# Patient Record
Sex: FEMALE | Race: WHITE | NOT HISPANIC OR LATINO
[De-identification: names, ages, dates, MRNs, and addresses within clinical notes are randomized per-mention and may not be internally consistent; named-entity substitution may affect disease eponyms.]

---

## 2017-05-27 ENCOUNTER — APPOINTMENT (OUTPATIENT)
Dept: MAMMOGRAPHY | Facility: CLINIC | Age: 52
End: 2017-05-27

## 2017-05-27 ENCOUNTER — OUTPATIENT (OUTPATIENT)
Dept: OUTPATIENT SERVICES | Facility: HOSPITAL | Age: 52
LOS: 1 days | End: 2017-05-27
Payer: COMMERCIAL

## 2017-05-27 DIAGNOSIS — Z00.8 ENCOUNTER FOR OTHER GENERAL EXAMINATION: ICD-10-CM

## 2017-05-27 PROCEDURE — 77063 BREAST TOMOSYNTHESIS BI: CPT

## 2017-05-27 PROCEDURE — 77067 SCR MAMMO BI INCL CAD: CPT

## 2017-06-15 ENCOUNTER — APPOINTMENT (OUTPATIENT)
Dept: ULTRASOUND IMAGING | Facility: CLINIC | Age: 52
End: 2017-06-15

## 2017-08-14 ENCOUNTER — APPOINTMENT (OUTPATIENT)
Dept: DERMATOLOGY | Facility: CLINIC | Age: 52
End: 2017-08-14
Payer: COMMERCIAL

## 2017-08-14 PROCEDURE — 99201 OFFICE OUTPATIENT NEW 10 MINUTES: CPT

## 2017-09-05 ENCOUNTER — NON-APPOINTMENT (OUTPATIENT)
Age: 52
End: 2017-09-05

## 2017-09-05 ENCOUNTER — APPOINTMENT (OUTPATIENT)
Dept: FAMILY MEDICINE | Facility: CLINIC | Age: 52
End: 2017-09-05
Payer: COMMERCIAL

## 2017-09-05 VITALS
OXYGEN SATURATION: 98 % | HEIGHT: 66 IN | SYSTOLIC BLOOD PRESSURE: 124 MMHG | WEIGHT: 190 LBS | BODY MASS INDEX: 30.53 KG/M2 | HEART RATE: 104 BPM | DIASTOLIC BLOOD PRESSURE: 82 MMHG

## 2017-09-05 DIAGNOSIS — Z78.9 OTHER SPECIFIED HEALTH STATUS: ICD-10-CM

## 2017-09-05 DIAGNOSIS — Z23 ENCOUNTER FOR IMMUNIZATION: ICD-10-CM

## 2017-09-05 DIAGNOSIS — Z87.891 PERSONAL HISTORY OF NICOTINE DEPENDENCE: ICD-10-CM

## 2017-09-05 DIAGNOSIS — Z82.5 FAMILY HISTORY OF ASTHMA AND OTHER CHRONIC LOWER RESPIRATORY DISEASES: ICD-10-CM

## 2017-09-05 DIAGNOSIS — Z83.3 FAMILY HISTORY OF DIABETES MELLITUS: ICD-10-CM

## 2017-09-05 PROCEDURE — 99386 PREV VISIT NEW AGE 40-64: CPT | Mod: 25

## 2017-09-05 PROCEDURE — 90471 IMMUNIZATION ADMIN: CPT

## 2017-09-05 PROCEDURE — 36415 COLL VENOUS BLD VENIPUNCTURE: CPT

## 2017-09-05 PROCEDURE — 90715 TDAP VACCINE 7 YRS/> IM: CPT

## 2017-09-05 PROCEDURE — 93000 ELECTROCARDIOGRAM COMPLETE: CPT

## 2017-09-05 PROCEDURE — 99213 OFFICE O/P EST LOW 20 MIN: CPT | Mod: 25

## 2017-09-06 LAB
FERRITIN SERPL-MCNC: 256 NG/ML
FOLATE SERPL-MCNC: 10.6 NG/ML
IRON SATN MFR SERPL: 41 %
IRON SERPL-MCNC: 116 UG/DL
TIBC SERPL-MCNC: 280 UG/DL
UIBC SERPL-MCNC: 164 UG/DL
VIT B12 SERPL-MCNC: 471 PG/ML

## 2017-09-15 LAB — HEMOCCULT STL QL IA: NEGATIVE

## 2017-10-17 ENCOUNTER — APPOINTMENT (OUTPATIENT)
Dept: FAMILY MEDICINE | Facility: CLINIC | Age: 52
End: 2017-10-17
Payer: COMMERCIAL

## 2017-10-17 VITALS
TEMPERATURE: 97.8 F | WEIGHT: 190 LBS | SYSTOLIC BLOOD PRESSURE: 120 MMHG | HEART RATE: 88 BPM | BODY MASS INDEX: 30.53 KG/M2 | DIASTOLIC BLOOD PRESSURE: 70 MMHG | HEIGHT: 66 IN

## 2017-10-17 DIAGNOSIS — Z23 ENCOUNTER FOR IMMUNIZATION: ICD-10-CM

## 2017-10-17 DIAGNOSIS — R22.9 LOCALIZED SWELLING, MASS AND LUMP, UNSPECIFIED: ICD-10-CM

## 2017-10-17 PROCEDURE — 90686 IIV4 VACC NO PRSV 0.5 ML IM: CPT

## 2017-10-17 PROCEDURE — G0008: CPT

## 2017-10-17 PROCEDURE — 99214 OFFICE O/P EST MOD 30 MIN: CPT | Mod: 25

## 2017-10-22 ENCOUNTER — FORM ENCOUNTER (OUTPATIENT)
Age: 52
End: 2017-10-22

## 2017-10-23 ENCOUNTER — APPOINTMENT (OUTPATIENT)
Dept: ULTRASOUND IMAGING | Facility: CLINIC | Age: 52
End: 2017-10-23
Payer: COMMERCIAL

## 2017-10-23 ENCOUNTER — OUTPATIENT (OUTPATIENT)
Dept: OUTPATIENT SERVICES | Facility: HOSPITAL | Age: 52
LOS: 1 days | End: 2017-10-23
Payer: COMMERCIAL

## 2017-10-23 DIAGNOSIS — R22.9 LOCALIZED SWELLING, MASS AND LUMP, UNSPECIFIED: ICD-10-CM

## 2017-10-23 PROCEDURE — 76882 US LMTD JT/FCL EVL NVASC XTR: CPT

## 2017-10-23 PROCEDURE — 76882 US LMTD JT/FCL EVL NVASC XTR: CPT | Mod: 26,RT

## 2017-11-02 ENCOUNTER — APPOINTMENT (OUTPATIENT)
Dept: FAMILY MEDICINE | Facility: CLINIC | Age: 52
End: 2017-11-02
Payer: COMMERCIAL

## 2017-11-02 VITALS
HEART RATE: 119 BPM | DIASTOLIC BLOOD PRESSURE: 90 MMHG | WEIGHT: 189 LBS | HEIGHT: 66 IN | OXYGEN SATURATION: 98 % | BODY MASS INDEX: 30.37 KG/M2 | SYSTOLIC BLOOD PRESSURE: 145 MMHG

## 2017-11-02 PROCEDURE — 99214 OFFICE O/P EST MOD 30 MIN: CPT

## 2017-12-11 ENCOUNTER — APPOINTMENT (OUTPATIENT)
Dept: GASTROENTEROLOGY | Facility: CLINIC | Age: 52
End: 2017-12-11
Payer: COMMERCIAL

## 2017-12-11 VITALS
OXYGEN SATURATION: 98 % | BODY MASS INDEX: 31.5 KG/M2 | HEART RATE: 87 BPM | WEIGHT: 196 LBS | SYSTOLIC BLOOD PRESSURE: 112 MMHG | HEIGHT: 66 IN | RESPIRATION RATE: 16 BRPM | DIASTOLIC BLOOD PRESSURE: 80 MMHG

## 2017-12-11 DIAGNOSIS — Z87.442 PERSONAL HISTORY OF URINARY CALCULI: ICD-10-CM

## 2017-12-11 PROCEDURE — 99203 OFFICE O/P NEW LOW 30 MIN: CPT

## 2018-01-16 ENCOUNTER — APPOINTMENT (OUTPATIENT)
Dept: FAMILY MEDICINE | Facility: CLINIC | Age: 53
End: 2018-01-16

## 2018-02-12 ENCOUNTER — APPOINTMENT (OUTPATIENT)
Dept: FAMILY MEDICINE | Facility: CLINIC | Age: 53
End: 2018-02-12
Payer: COMMERCIAL

## 2018-02-12 VITALS
BODY MASS INDEX: 30.59 KG/M2 | DIASTOLIC BLOOD PRESSURE: 72 MMHG | SYSTOLIC BLOOD PRESSURE: 118 MMHG | HEART RATE: 98 BPM | HEIGHT: 66 IN | OXYGEN SATURATION: 98 % | WEIGHT: 190.38 LBS

## 2018-02-12 DIAGNOSIS — M54.5 LOW BACK PAIN: ICD-10-CM

## 2018-02-12 PROCEDURE — 99214 OFFICE O/P EST MOD 30 MIN: CPT

## 2018-02-27 ENCOUNTER — OUTPATIENT (OUTPATIENT)
Dept: OUTPATIENT SERVICES | Facility: HOSPITAL | Age: 53
LOS: 1 days | End: 2018-02-27
Payer: COMMERCIAL

## 2018-02-27 ENCOUNTER — APPOINTMENT (OUTPATIENT)
Dept: GASTROENTEROLOGY | Facility: GI CENTER | Age: 53
End: 2018-02-27
Payer: COMMERCIAL

## 2018-02-27 ENCOUNTER — RESULT REVIEW (OUTPATIENT)
Age: 53
End: 2018-02-27

## 2018-02-27 DIAGNOSIS — Z12.11 ENCOUNTER FOR SCREENING FOR MALIGNANT NEOPLASM OF COLON: ICD-10-CM

## 2018-02-27 LAB
ANION GAP SERPL CALC-SCNC: 15 MMOL/L
BASOPHILS # BLD AUTO: 0.04 K/UL
BASOPHILS NFR BLD AUTO: 0.5 %
BUN SERPL-MCNC: 16 MG/DL
CALCIUM SERPL-MCNC: 10.5 MG/DL
CHLORIDE SERPL-SCNC: 99 MMOL/L
CO2 SERPL-SCNC: 27 MMOL/L
CREAT SERPL-MCNC: 0.92 MG/DL
EOSINOPHIL # BLD AUTO: 0.17 K/UL
EOSINOPHIL NFR BLD AUTO: 2.3 %
GLUCOSE BLDC GLUCOMTR-MCNC: 132 MG/DL — HIGH (ref 70–99)
GLUCOSE SERPL-MCNC: 253 MG/DL
HCT VFR BLD CALC: 48.8 %
HGB BLD-MCNC: 16.5 G/DL
IMM GRANULOCYTES NFR BLD AUTO: 0.3 %
INR PPP: 0.94 RATIO
LYMPHOCYTES # BLD AUTO: 3.37 K/UL
LYMPHOCYTES NFR BLD AUTO: 44.9 %
MAN DIFF?: NORMAL
MCHC RBC-ENTMCNC: 32 PG
MCHC RBC-ENTMCNC: 33.8 GM/DL
MCV RBC AUTO: 94.6 FL
MONOCYTES # BLD AUTO: 0.44 K/UL
MONOCYTES NFR BLD AUTO: 5.9 %
NEUTROPHILS # BLD AUTO: 3.46 K/UL
NEUTROPHILS NFR BLD AUTO: 46.1 %
PLATELET # BLD AUTO: 292 K/UL
POTASSIUM SERPL-SCNC: 4.5 MMOL/L
PT BLD: 10.6 SEC
RBC # BLD: 5.16 M/UL
RBC # FLD: 12.9 %
SODIUM SERPL-SCNC: 141 MMOL/L
WBC # FLD AUTO: 7.5 K/UL

## 2018-02-27 PROCEDURE — 45385 COLONOSCOPY W/LESION REMOVAL: CPT | Mod: PT

## 2018-02-27 PROCEDURE — 82962 GLUCOSE BLOOD TEST: CPT

## 2018-02-27 PROCEDURE — 45385 COLONOSCOPY W/LESION REMOVAL: CPT | Mod: 33

## 2018-02-27 PROCEDURE — 88305 TISSUE EXAM BY PATHOLOGIST: CPT

## 2018-02-27 PROCEDURE — 88305 TISSUE EXAM BY PATHOLOGIST: CPT | Mod: 26

## 2018-03-01 ENCOUNTER — TRANSCRIPTION ENCOUNTER (OUTPATIENT)
Age: 53
End: 2018-03-01

## 2018-03-01 LAB — SURGICAL PATHOLOGY FINAL REPORT - CH: SIGNIFICANT CHANGE UP

## 2018-03-26 ENCOUNTER — NON-APPOINTMENT (OUTPATIENT)
Age: 53
End: 2018-03-26

## 2018-03-26 ENCOUNTER — EMERGENCY (EMERGENCY)
Facility: HOSPITAL | Age: 53
LOS: 1 days | Discharge: HOME | End: 2018-03-26
Attending: EMERGENCY MEDICINE
Payer: COMMERCIAL

## 2018-03-26 ENCOUNTER — APPOINTMENT (OUTPATIENT)
Dept: FAMILY MEDICINE | Facility: CLINIC | Age: 53
End: 2018-03-26
Payer: COMMERCIAL

## 2018-03-26 VITALS
DIASTOLIC BLOOD PRESSURE: 90 MMHG | BODY MASS INDEX: 30.05 KG/M2 | SYSTOLIC BLOOD PRESSURE: 140 MMHG | HEART RATE: 88 BPM | WEIGHT: 187 LBS | HEIGHT: 66 IN

## 2018-03-26 VITALS
OXYGEN SATURATION: 97 % | TEMPERATURE: 98 F | HEART RATE: 77 BPM | SYSTOLIC BLOOD PRESSURE: 121 MMHG | RESPIRATION RATE: 20 BRPM | DIASTOLIC BLOOD PRESSURE: 80 MMHG

## 2018-03-26 VITALS — WEIGHT: 184.97 LBS | HEIGHT: 66 IN

## 2018-03-26 DIAGNOSIS — D58.2 OTHER HEMOGLOBINOPATHIES: ICD-10-CM

## 2018-03-26 LAB
ALBUMIN SERPL ELPH-MCNC: 4 G/DL — SIGNIFICANT CHANGE UP (ref 3.3–5.2)
ALP SERPL-CCNC: 100 U/L — SIGNIFICANT CHANGE UP (ref 40–120)
ALT FLD-CCNC: 15 U/L — SIGNIFICANT CHANGE UP
ANION GAP SERPL CALC-SCNC: 13 MMOL/L — SIGNIFICANT CHANGE UP (ref 5–17)
APTT BLD: 29.2 SEC — SIGNIFICANT CHANGE UP (ref 27.5–37.4)
AST SERPL-CCNC: 17 U/L — SIGNIFICANT CHANGE UP
BILIRUB SERPL-MCNC: 0.3 MG/DL — LOW (ref 0.4–2)
BUN SERPL-MCNC: 21 MG/DL — HIGH (ref 8–20)
CALCIUM SERPL-MCNC: 9.5 MG/DL — SIGNIFICANT CHANGE UP (ref 8.6–10.2)
CHLORIDE SERPL-SCNC: 101 MMOL/L — SIGNIFICANT CHANGE UP (ref 98–107)
CO2 SERPL-SCNC: 27 MMOL/L — SIGNIFICANT CHANGE UP (ref 22–29)
CREAT SERPL-MCNC: 0.94 MG/DL — SIGNIFICANT CHANGE UP (ref 0.5–1.3)
CYTOLOGY CVX/VAG DOC THIN PREP: NORMAL
D DIMER BLD IA.RAPID-MCNC: <150 NG/ML DDU — SIGNIFICANT CHANGE UP
GLUCOSE SERPL-MCNC: 203 MG/DL — HIGH (ref 70–115)
HCT VFR BLD CALC: 41.5 % — SIGNIFICANT CHANGE UP (ref 37–47)
HGB BLD-MCNC: 14 G/DL — SIGNIFICANT CHANGE UP (ref 12–16)
INR BLD: 0.94 RATIO — SIGNIFICANT CHANGE UP (ref 0.88–1.16)
MCHC RBC-ENTMCNC: 30.6 PG — SIGNIFICANT CHANGE UP (ref 27–31)
MCHC RBC-ENTMCNC: 33.7 G/DL — SIGNIFICANT CHANGE UP (ref 32–36)
MCV RBC AUTO: 90.6 FL — SIGNIFICANT CHANGE UP (ref 81–99)
PLATELET # BLD AUTO: 282 K/UL — SIGNIFICANT CHANGE UP (ref 150–400)
POTASSIUM SERPL-MCNC: 3.8 MMOL/L — SIGNIFICANT CHANGE UP (ref 3.5–5.3)
POTASSIUM SERPL-SCNC: 3.8 MMOL/L — SIGNIFICANT CHANGE UP (ref 3.5–5.3)
PROT SERPL-MCNC: 6.8 G/DL — SIGNIFICANT CHANGE UP (ref 6.6–8.7)
PROTHROM AB SERPL-ACNC: 10.3 SEC — SIGNIFICANT CHANGE UP (ref 9.8–12.7)
RBC # BLD: 4.58 M/UL — SIGNIFICANT CHANGE UP (ref 4.4–5.2)
RBC # FLD: 12.2 % — SIGNIFICANT CHANGE UP (ref 11–15.6)
SODIUM SERPL-SCNC: 141 MMOL/L — SIGNIFICANT CHANGE UP (ref 135–145)
TROPONIN T SERPL-MCNC: <0.01 NG/ML — SIGNIFICANT CHANGE UP (ref 0–0.06)
TROPONIN T SERPL-MCNC: <0.01 NG/ML — SIGNIFICANT CHANGE UP (ref 0–0.06)
WBC # BLD: 9 K/UL — SIGNIFICANT CHANGE UP (ref 4.8–10.8)
WBC # FLD AUTO: 9 K/UL — SIGNIFICANT CHANGE UP (ref 4.8–10.8)

## 2018-03-26 PROCEDURE — 85379 FIBRIN DEGRADATION QUANT: CPT

## 2018-03-26 PROCEDURE — 99283 EMERGENCY DEPT VISIT LOW MDM: CPT | Mod: 25

## 2018-03-26 PROCEDURE — 71045 X-RAY EXAM CHEST 1 VIEW: CPT

## 2018-03-26 PROCEDURE — 84484 ASSAY OF TROPONIN QUANT: CPT

## 2018-03-26 PROCEDURE — 93005 ELECTROCARDIOGRAM TRACING: CPT

## 2018-03-26 PROCEDURE — 85610 PROTHROMBIN TIME: CPT

## 2018-03-26 PROCEDURE — 36415 COLL VENOUS BLD VENIPUNCTURE: CPT

## 2018-03-26 PROCEDURE — 99406 BEHAV CHNG SMOKING 3-10 MIN: CPT

## 2018-03-26 PROCEDURE — 84443 ASSAY THYROID STIM HORMONE: CPT

## 2018-03-26 PROCEDURE — 93010 ELECTROCARDIOGRAM REPORT: CPT

## 2018-03-26 PROCEDURE — 85027 COMPLETE CBC AUTOMATED: CPT

## 2018-03-26 PROCEDURE — 99215 OFFICE O/P EST HI 40 MIN: CPT | Mod: 25

## 2018-03-26 PROCEDURE — 85730 THROMBOPLASTIN TIME PARTIAL: CPT

## 2018-03-26 PROCEDURE — 71045 X-RAY EXAM CHEST 1 VIEW: CPT | Mod: 26

## 2018-03-26 PROCEDURE — 99285 EMERGENCY DEPT VISIT HI MDM: CPT

## 2018-03-26 PROCEDURE — 80053 COMPREHEN METABOLIC PANEL: CPT

## 2018-03-26 PROCEDURE — 93000 ELECTROCARDIOGRAM COMPLETE: CPT

## 2018-03-26 RX ORDER — SODIUM CHLORIDE 9 MG/ML
1000 INJECTION INTRAMUSCULAR; INTRAVENOUS; SUBCUTANEOUS ONCE
Qty: 0 | Refills: 0 | Status: COMPLETED | OUTPATIENT
Start: 2018-03-26 | End: 2018-03-26

## 2018-03-26 RX ADMIN — SODIUM CHLORIDE 1000 MILLILITER(S): 9 INJECTION INTRAMUSCULAR; INTRAVENOUS; SUBCUTANEOUS at 20:41

## 2018-03-26 NOTE — ED ADULT NURSE NOTE - OBJECTIVE STATEMENT
Patient A&OX3, c/o chest pain and SOB. Pt states that she went to her PMD and sent her here do to high hemoglobin result. VSS. CM in place. NSR.

## 2018-03-26 NOTE — ED PROVIDER NOTE - OBJECTIVE STATEMENT
Pt is a 52yoF with HTN, HLD, DM, hypothyroid, presenting with intermittent sharp chest pain associated with palpitations and some SOB, for " months".  Pt states she had blood work done recently showing a very high " hemoglobin" and her doctor told her to come to the ER to get checked.  Pt drinks occasionally. Denies any cardiac history. Does not see a cardiologist. Denies travel/ leg pain/ calf swelling.   No sig FH for CAD.  Pt is a smoker. No chest pain at present. Last episode occurred around 630PM.

## 2018-03-26 NOTE — ED ADULT TRIAGE NOTE - CHIEF COMPLAINT QUOTE
pt reports was sent over by md for possible admit due to her "heart problems" reports has had intermittent chest pain , had  a negative stress test and ekg but does not have any copies of it with her

## 2018-03-26 NOTE — ED ADULT NURSE REASSESSMENT NOTE - NS ED NURSE REASSESS COMMENT FT1
Assumed care of pt @ 1930. unable to complete Assessment till this time, just returned to floor from Code. pt a+ox4, lying comfortably in bed, denies chest pain or SOB. pt denies any pain of discomfort. abd soft nontender, nondistended. bilat lung sounds clear, resp even and unlabored. IV bolus infusing without difficulty. provided pt with food, tolerating without difficulty. pt awaiting repeat BW, will continue to monitor.

## 2018-03-26 NOTE — ED PROVIDER NOTE - PROGRESS NOTE DETAILS
pt has been asymptomatic throughout ED stay. I reviewed patient's results with patient and allowed the opportunity for questions.  I provided the patient with anticipatory guidance, advised followup with PCP and cardiology, and gave return precautions. Patient reported that they were feeling well for discharge and expressed understanding of instructions.  Patient is well for discharge.

## 2018-03-26 NOTE — ED PROVIDER NOTE - MEDICAL DECISION MAKING DETAILS
Pt here for months of palpitations and chest pain, sent in today after concern for possible polycythemia; plan to check labs, troponin x 2, CXR, reevaluate.

## 2018-03-27 PROBLEM — D58.2 ABNORMAL HEMOGLOBIN: Status: ACTIVE | Noted: 2018-03-27

## 2018-07-24 PROBLEM — Z78.9 ALCOHOL USE: Status: ACTIVE | Noted: 2017-09-05

## 2018-07-31 PROBLEM — E03.9 HYPOTHYROIDISM, UNSPECIFIED: Chronic | Status: ACTIVE | Noted: 2018-03-26

## 2018-07-31 PROBLEM — E78.00 PURE HYPERCHOLESTEROLEMIA, UNSPECIFIED: Chronic | Status: ACTIVE | Noted: 2018-03-26

## 2018-07-31 PROBLEM — I10 ESSENTIAL (PRIMARY) HYPERTENSION: Chronic | Status: ACTIVE | Noted: 2018-03-26

## 2018-07-31 PROBLEM — E11.9 TYPE 2 DIABETES MELLITUS WITHOUT COMPLICATIONS: Chronic | Status: ACTIVE | Noted: 2018-03-26

## 2018-08-27 NOTE — ED ADULT NURSE NOTE - NEURO WDL
dyspnea/cough Alert and oriented to person, place and time, memory intact, behavior appropriate to situation, PERRL.

## 2018-09-24 ENCOUNTER — MEDICATION RENEWAL (OUTPATIENT)
Age: 53
End: 2018-09-24

## 2018-09-24 RX ORDER — INSULIN ASPART 100 [IU]/ML
100 INJECTION, SOLUTION INTRAVENOUS; SUBCUTANEOUS
Qty: 20 | Refills: 0 | Status: DISCONTINUED | COMMUNITY
Start: 2017-02-06 | End: 2018-09-24

## 2018-10-08 ENCOUNTER — RX RENEWAL (OUTPATIENT)
Age: 53
End: 2018-10-08

## 2018-11-09 ENCOUNTER — APPOINTMENT (OUTPATIENT)
Dept: ENDOCRINOLOGY | Facility: CLINIC | Age: 53
End: 2018-11-09
Payer: COMMERCIAL

## 2018-11-09 VITALS
HEIGHT: 66 IN | WEIGHT: 183 LBS | BODY MASS INDEX: 29.41 KG/M2 | DIASTOLIC BLOOD PRESSURE: 80 MMHG | SYSTOLIC BLOOD PRESSURE: 132 MMHG | HEART RATE: 113 BPM

## 2018-11-09 DIAGNOSIS — Z87.898 PERSONAL HISTORY OF OTHER SPECIFIED CONDITIONS: ICD-10-CM

## 2018-11-09 LAB — GLUCOSE BLDC GLUCOMTR-MCNC: 236

## 2018-11-09 PROCEDURE — 82962 GLUCOSE BLOOD TEST: CPT

## 2018-11-09 PROCEDURE — 95251 CONT GLUC MNTR ANALYSIS I&R: CPT

## 2018-11-09 PROCEDURE — 99214 OFFICE O/P EST MOD 30 MIN: CPT | Mod: 25

## 2018-11-09 RX ORDER — LEVOTHYROXINE SODIUM 0.15 MG/1
150 TABLET ORAL
Qty: 90 | Refills: 1 | Status: DISCONTINUED | COMMUNITY
Start: 2017-08-08 | End: 2018-11-09

## 2018-11-09 RX ORDER — POLYETHYLENE GLYCOL 3350, SODIUM SULFATE, SODIUM CHLORIDE, POTASSIUM CHLORIDE, ASCORBIC ACID, SODIUM ASCORBATE 7.5-2.691G
100 KIT ORAL
Qty: 1 | Refills: 0 | Status: DISCONTINUED | COMMUNITY
Start: 2017-12-11 | End: 2018-11-09

## 2018-11-09 RX ORDER — POLYETHYLENE GLYCOL 3350 17 G/17G
17 POWDER, FOR SOLUTION ORAL DAILY
Qty: 30 | Refills: 1 | Status: DISCONTINUED | COMMUNITY
Start: 2018-02-12 | End: 2018-11-09

## 2018-11-09 RX ORDER — SPIRONOLACTONE 100 MG/1
100 TABLET ORAL
Qty: 30 | Refills: 0 | Status: DISCONTINUED | COMMUNITY
Start: 2017-10-13 | End: 2018-11-09

## 2018-11-09 RX ORDER — INSULIN GLARGINE 100 [IU]/ML
100 INJECTION, SOLUTION SUBCUTANEOUS
Qty: 15 | Refills: 0 | Status: DISCONTINUED | COMMUNITY
Start: 2017-04-17 | End: 2018-11-09

## 2018-11-09 RX ORDER — METRONIDAZOLE 7.5 MG/G
0.75 CREAM TOPICAL
Qty: 45 | Refills: 0 | Status: DISCONTINUED | COMMUNITY
Start: 2017-08-14 | End: 2018-11-09

## 2018-11-12 LAB
25(OH)D3 SERPL-MCNC: 61.1 NG/ML
ALBUMIN SERPL ELPH-MCNC: 4.7 G/DL
ALP BLD-CCNC: 108 U/L
ALT SERPL-CCNC: 16 U/L
ANION GAP SERPL CALC-SCNC: 15 MMOL/L
AST SERPL-CCNC: 17 U/L
BASOPHILS # BLD AUTO: 0.04 K/UL
BASOPHILS NFR BLD AUTO: 0.4 %
BILIRUB SERPL-MCNC: 0.5 MG/DL
BUN SERPL-MCNC: 18 MG/DL
CALCIUM SERPL-MCNC: 10.4 MG/DL
CHLORIDE SERPL-SCNC: 98 MMOL/L
CHOLEST SERPL-MCNC: 229 MG/DL
CHOLEST/HDLC SERPL: 3.1 RATIO
CO2 SERPL-SCNC: 27 MMOL/L
CREAT SERPL-MCNC: 0.87 MG/DL
CREAT SPEC-SCNC: 74 MG/DL
EOSINOPHIL # BLD AUTO: 0.09 K/UL
EOSINOPHIL NFR BLD AUTO: 1 %
GLUCOSE SERPL-MCNC: 199 MG/DL
HBA1C MFR BLD HPLC: 7.8 %
HCT VFR BLD CALC: 46.6 %
HDLC SERPL-MCNC: 74 MG/DL
HGB BLD-MCNC: 16.4 G/DL
IMM GRANULOCYTES NFR BLD AUTO: 0.2 %
LDLC SERPL CALC-MCNC: 138 MG/DL
LYMPHOCYTES # BLD AUTO: 2.59 K/UL
LYMPHOCYTES NFR BLD AUTO: 27.7 %
MAN DIFF?: NORMAL
MCHC RBC-ENTMCNC: 31.7 PG
MCHC RBC-ENTMCNC: 35.2 GM/DL
MCV RBC AUTO: 90.1 FL
MICROALBUMIN 24H UR DL<=1MG/L-MCNC: <1.2 MG/DL
MICROALBUMIN/CREAT 24H UR-RTO: NORMAL
MONOCYTES # BLD AUTO: 0.58 K/UL
MONOCYTES NFR BLD AUTO: 6.2 %
NEUTROPHILS # BLD AUTO: 6.02 K/UL
NEUTROPHILS NFR BLD AUTO: 64.5 %
PLATELET # BLD AUTO: 307 K/UL
POTASSIUM SERPL-SCNC: 4.5 MMOL/L
PROT SERPL-MCNC: 7.6 G/DL
RBC # BLD: 5.17 M/UL
RBC # FLD: 12.8 %
SODIUM SERPL-SCNC: 140 MMOL/L
T4 FREE SERPL-MCNC: 1.5 NG/DL
TRIGL SERPL-MCNC: 83 MG/DL
TSH SERPL-ACNC: 1.59 UIU/ML
WBC # FLD AUTO: 9.34 K/UL

## 2018-11-21 ENCOUNTER — RECORD ABSTRACTING (OUTPATIENT)
Age: 53
End: 2018-11-21

## 2018-11-26 ENCOUNTER — APPOINTMENT (OUTPATIENT)
Dept: ENDOCRINOLOGY | Facility: CLINIC | Age: 53
End: 2018-11-26

## 2018-11-26 ENCOUNTER — RECORD ABSTRACTING (OUTPATIENT)
Age: 53
End: 2018-11-26

## 2018-12-05 ENCOUNTER — RX RENEWAL (OUTPATIENT)
Age: 53
End: 2018-12-05

## 2018-12-17 ENCOUNTER — RX RENEWAL (OUTPATIENT)
Age: 53
End: 2018-12-17

## 2019-01-25 ENCOUNTER — RX RENEWAL (OUTPATIENT)
Age: 54
End: 2019-01-25

## 2019-01-28 ENCOUNTER — RX RENEWAL (OUTPATIENT)
Age: 54
End: 2019-01-28

## 2019-01-28 RX ORDER — INSULIN LISPRO 100 [IU]/ML
100 INJECTION, SOLUTION INTRAVENOUS; SUBCUTANEOUS
Qty: 60 | Refills: 1 | Status: DISCONTINUED | COMMUNITY
Start: 2017-08-28 | End: 2019-01-28

## 2019-02-04 ENCOUNTER — RX RENEWAL (OUTPATIENT)
Age: 54
End: 2019-02-04

## 2019-03-04 ENCOUNTER — MEDICATION RENEWAL (OUTPATIENT)
Age: 54
End: 2019-03-04

## 2019-04-09 ENCOUNTER — APPOINTMENT (OUTPATIENT)
Dept: ENDOCRINOLOGY | Facility: CLINIC | Age: 54
End: 2019-04-09
Payer: COMMERCIAL

## 2019-04-09 VITALS
HEART RATE: 95 BPM | DIASTOLIC BLOOD PRESSURE: 80 MMHG | BODY MASS INDEX: 27.97 KG/M2 | WEIGHT: 174 LBS | SYSTOLIC BLOOD PRESSURE: 138 MMHG | HEIGHT: 66 IN

## 2019-04-09 LAB — GLUCOSE BLDC GLUCOMTR-MCNC: 150

## 2019-04-09 PROCEDURE — 99214 OFFICE O/P EST MOD 30 MIN: CPT | Mod: 25

## 2019-04-09 PROCEDURE — 82962 GLUCOSE BLOOD TEST: CPT

## 2019-04-09 NOTE — PHYSICAL EXAM
[No Acute Distress] : no acute distress [Well Nourished] : well nourished [No Proptosis] : no proptosis [Normal Sclera/Conjunctiva] : normal sclera/conjunctiva [Well Developed] : well developed [No Neck Mass] : no neck mass was observed [Thyroid Not Enlarged] : the thyroid was not enlarged [No LAD] : no lymphadenopathy [Clear to Auscultation] : lungs were clear to auscultation bilaterally [Normal Rate and Effort] : normal respiratory rhythm and effort [No Thyroid Nodules] : there were no palpable thyroid nodules [Normal S1, S2] : normal S1 and S2 [Normal Rate] : heart rate was normal  [Murmurs] : no murmurs [No Edema] : there was no peripheral edema [Regular Rhythm] : with a regular rhythm [No Clubbing, Cyanosis] : no clubbing  or cyanosis of the fingernails [Normal Gait] : normal gait [Normal Insight/Judgement] : insight and judgment were intact [Normal Mood] : the mood was normal [Normal Affect] : the affect was normal

## 2019-04-09 NOTE — ASSESSMENT
[FreeTextEntry1] : 53 year old female with type 1 DM, Hypothyroidism, Hyperlipidemia and HTN.  Her glycemic control remains above goal.\par \par 1.  T1DM-   Will send Rx for Freestyle victor m CGM to local pharmacy.  If not covered, patient may be willing to pay pérez for this.  Will D/C insulin pump due to patient request and start basal bolus regimen with Tresiba 38 units qhs and Novolog Flexpen using IC ratio of 1:5 and ISF of 1:50 with goal BG of 120 mg/dl.  Check A1c now.  \par 2.  hypothyroidism-  Continue LT4, check TFTs\par 3.  Hyperlipidemia-  continue statin\par 4.  HTN-   continue losartan\par 5.  vitamin D deficiency-   continue Rx, check level now.

## 2019-04-09 NOTE — REVIEW OF SYSTEMS
[Recent Weight Loss (___ Lbs)] : recent [unfilled] ~Ulb weight loss [Chest Pain] : no chest pain [Shortness Of Breath] : no shortness of breath [Polyuria] : no polyuria [Nausea] : no nausea [Polydipsia] : no polydipsia [FreeTextEntry3] : + DR

## 2019-04-09 NOTE — HISTORY OF PRESENT ILLNESS
[FreeTextEntry1] : Patient is seen today for a routine diabetic follow up.  Also with hypothyroidism and hyperlipidemia.\par Quality:  type 1\par Severity:  moderate\par Duration of diabetes:  since 2000\par Associated Complications/ Symptoms:  retinopathy\par Modifying Factors:  Better with insulin pump\par \par Patient tests blood glucose 4 times per day.\par \par Current Diabetic Medication Regimen:\par Omnipod insulin pump\par Was on Dexcom G4, no longer using.  (was having problems with insurance coverage).\par Wants to come off pump due to high cost of supplies.  \par \par reviewed log and average is 189 mg/dl with range of 70- 315 mg/dl.  \par \par  \par

## 2019-04-22 ENCOUNTER — RX RENEWAL (OUTPATIENT)
Age: 54
End: 2019-04-22

## 2019-04-29 ENCOUNTER — RX RENEWAL (OUTPATIENT)
Age: 54
End: 2019-04-29

## 2019-06-19 ENCOUNTER — MEDICATION RENEWAL (OUTPATIENT)
Age: 54
End: 2019-06-19

## 2019-06-24 ENCOUNTER — RX RENEWAL (OUTPATIENT)
Age: 54
End: 2019-06-24

## 2019-07-26 ENCOUNTER — RX RENEWAL (OUTPATIENT)
Age: 54
End: 2019-07-26

## 2019-09-24 ENCOUNTER — APPOINTMENT (OUTPATIENT)
Dept: ENDOCRINOLOGY | Facility: CLINIC | Age: 54
End: 2019-09-24

## 2019-09-30 ENCOUNTER — RX RENEWAL (OUTPATIENT)
Age: 54
End: 2019-09-30

## 2019-12-27 ENCOUNTER — RX RENEWAL (OUTPATIENT)
Age: 54
End: 2019-12-27

## 2019-12-30 ENCOUNTER — RX RENEWAL (OUTPATIENT)
Age: 54
End: 2019-12-30

## 2020-01-22 LAB
HBA1C MFR BLD HPLC: 7.8
LDLC SERPL DIRECT ASSAY-MCNC: 123
MICROALBUMIN/CREAT 24H UR-RTO: 2

## 2020-01-23 ENCOUNTER — APPOINTMENT (OUTPATIENT)
Dept: ENDOCRINOLOGY | Facility: CLINIC | Age: 55
End: 2020-01-23
Payer: COMMERCIAL

## 2020-01-23 VITALS
SYSTOLIC BLOOD PRESSURE: 120 MMHG | DIASTOLIC BLOOD PRESSURE: 70 MMHG | OXYGEN SATURATION: 99 % | HEIGHT: 66 IN | WEIGHT: 174 LBS | HEART RATE: 100 BPM | BODY MASS INDEX: 27.97 KG/M2

## 2020-01-23 LAB — GLUCOSE BLDC GLUCOMTR-MCNC: 203

## 2020-01-23 PROCEDURE — 82962 GLUCOSE BLOOD TEST: CPT

## 2020-01-23 PROCEDURE — 99214 OFFICE O/P EST MOD 30 MIN: CPT | Mod: 25

## 2020-01-23 RX ORDER — FLASH GLUCOSE SENSOR
KIT MISCELLANEOUS
Qty: 2 | Refills: 5 | Status: DISCONTINUED | COMMUNITY
Start: 2019-04-09 | End: 2020-01-23

## 2020-01-23 RX ORDER — FLASH GLUCOSE SCANNING READER
EACH MISCELLANEOUS
Qty: 1 | Refills: 0 | Status: DISCONTINUED | COMMUNITY
Start: 2019-04-09 | End: 2020-01-23

## 2020-01-23 NOTE — REVIEW OF SYSTEMS
[Recent Weight Loss (___ Lbs)] : recent [unfilled] ~Ulb weight loss [Chest Pain] : chest pain [Palpitations] : palpitations [Joint Stiffness] : joint stiffness [Hair Loss] : hair loss [Joint Pain] : joint pain [Fatigue] : no fatigue [Visual Field Defect] : no visual field defect [Blurry Vision] : no blurred vision [Dysphagia] : no dysphagia [Neck Pain] : no neck pain [Dysphonia] : no dysphonia [Shortness Of Breath] : no shortness of breath [Wheezing] : no wheezing was heard [Nausea] : no nausea [Constipation] : no constipation [Vomiting] : no vomiting was observed [Diarrhea] : no diarrhea [Polyuria] : no polyuria [Nocturia] : no nocturia [Acanthosis] : no acanthosis  [Dry Skin] : no dry skin [Headache] : no headaches [Tremors] : no tremors [Depression] : no depression [Anxiety] : no anxiety [Polydipsia] : no polydipsia [Cold Intolerance] : cold tolerant [Heat Intolerance] : heat tolerant [FreeTextEntry3] : only when BS is high  [FreeTextEntry5] : rare but needs cardiology work up

## 2020-01-23 NOTE — HISTORY OF PRESENT ILLNESS
[FreeTextEntry1] : Type 1 DM\par Severity: Moderate\par Duration: since 2000\par Modifying Factors: now off insulin pump\par Associated Symptoms: retinopathy \par \par SMBG\par Checks 4-6x a day\par On average fasting 100-150\par On average during day 180-250\par In office 203- had cheeze its for lunch \par \par Current drug regimen- Using ominpod for only math calculations of insulin pump \par Tresiba 38 units\par Novolog ICR 1:5 , ISF 1: 50\par \par Eye exam: has not gone in 2019, goes to center for eye care in [place]\par Foot exam: 5/2019- saw podiatry for in grown nail- denies numbness.tingling \par Kidney disease: denies\par Heart disease: denies\par \par Weight: has lost approx 15 lbs since last visit \par Diet: does not eat breakfast and picks on crackers for lunch, then eats dinner (protein, veggie, carb)\par pt cooks, does not eat out a lot \par Exercise: does not exercise \par Smoking: smokes less than a pack day- motivated to quit \par \par HypoT\par LT4 137 mcg\par Patient advised to take every day in the morning, on an empty stomach, and with no other medications. \par \par Vit D \par On weekly supplement \par \par HLD\par Atorvastatin 40 mg daily \par \par HTN\par HCTZ 25 mcg\par Losartan 50 mg daily

## 2020-01-23 NOTE — ASSESSMENT
[FreeTextEntry1] : T1DM\par -Continue tresiba and novolog regimen for now, will most likely make adjustments when pt labs result \par -Continue to check BS 4-6x a day but bring logs to next apt\par -Have more balanced meals, does a lot of picking and snacking but does not eat a real breakfast or lunch\par -Increase exercise as tolerated, goal of 30 mins a day 5x a week\par \par -Motivated to quit smoking, will reinforce importance at upcoming visits\par \par \par HypoT\par Continue current dose of LT4\par Was taking with all meds in AM, should be taking separate from all morning meds\par \par \par HLD\par Continue statin\par \par \par HTN\par Continue HCTZ and losartan\par \par \par Vit D Def\par Continue weekly supplement\par \par All labs to be drawn fasting this weekend when convenient for patient, will call with results\par RTO in 3 months

## 2020-01-23 NOTE — PHYSICAL EXAM
[Alert] : alert [Well Nourished] : well nourished [No Acute Distress] : no acute distress [Well Developed] : well developed [Normal Sclera/Conjunctiva] : normal sclera/conjunctiva [Normal Rate and Effort] : normal respiratory rhythm and effort [Supple] : the neck was supple [Normal Hearing] : hearing was normal [Clear to Auscultation] : lungs were clear to auscultation bilaterally [No Accessory Muscle Use] : no accessory muscle use [Normal Rate] : heart rate was normal  [Normal S1, S2] : normal S1 and S2 [No Edema] : there was no peripheral edema [Regular Rhythm] : with a regular rhythm [Soft] : abdomen soft [Not Tender] : non-tender [Post Cervical Nodes] : posterior cervical nodes [Anterior Cervical Nodes] : anterior cervical nodes [Normal] : normal and non tender [Normal Gait] : normal gait [No Rash] : no rash [Acanthosis Nigricans] : no acanthosis nigricans [No Tremors] : no tremors [Oriented x3] : oriented to person, place, and time

## 2020-03-11 ENCOUNTER — APPOINTMENT (OUTPATIENT)
Dept: FAMILY MEDICINE | Facility: CLINIC | Age: 55
End: 2020-03-11
Payer: COMMERCIAL

## 2020-03-11 VITALS
BODY MASS INDEX: 27.8 KG/M2 | HEART RATE: 102 BPM | WEIGHT: 173 LBS | DIASTOLIC BLOOD PRESSURE: 70 MMHG | SYSTOLIC BLOOD PRESSURE: 136 MMHG | HEIGHT: 66 IN

## 2020-03-11 DIAGNOSIS — M25.512 PAIN IN LEFT SHOULDER: ICD-10-CM

## 2020-03-11 DIAGNOSIS — M25.552 PAIN IN LEFT HIP: ICD-10-CM

## 2020-03-11 DIAGNOSIS — M62.838 OTHER MUSCLE SPASM: ICD-10-CM

## 2020-03-11 LAB
CREAT SPEC-SCNC: 126
HBA1C MFR BLD HPLC: 8.7
LDLC SERPL CALC-MCNC: 114
MICROALBUMIN 24H UR DL<=1MG/L-MCNC: 1
MICROALBUMIN/CREAT 24H UR-RTO: 8

## 2020-03-11 PROCEDURE — 99214 OFFICE O/P EST MOD 30 MIN: CPT

## 2020-03-16 NOTE — ADDENDUM
[FreeTextEntry1] : I, Lynn Martinez, acted solely as a scribe for Dr. Valle on this date [3/11/2020].\par

## 2020-03-16 NOTE — PLAN
[FreeTextEntry1] : Left shoulder/ left hip- advised orthopedic / will check for lyme\par and neck pain: massage/ ice/ change pillow/ heat compresses/ advised PT and referral given \par hypothyroidism: will check labs\par Diet and Activity: - Choose lean meats and poultry without skin and prepare them without added saturated and trans \par fat. Eat fish at least,twice a week. Recent research shows that eating oily \par fish containing omega-3,fatty acids (for example, salmon, trout and herring) \par may help lower your risk of death from coronary artery disease. Select \par fat-free, 1 percent fat and low-fat dairy products. Cut back on foods \par containing partially hydrogenated vegetable oils to reduce trans fat in your \par diet. To lower cholesterol, reduce saturated fat to no more than 5 to 6 \par percent of total calories. For someone eating 2,000 calories a day, that’s \par about 13 grams of saturated fat. Cut back on beverages and foods with added \par sugars. Choose and prepare foods with little or no salt. To lower blood \par pressure, aim to eat no more than 2,400 milligrams of sodium per day. \par Reducing daily intake to 1,500 mg is desirable because it can lower blood \par pressure even further. If you drink alcohol, drink in moderation. That means \par one drink per day if youre a woman and two drinks per day if youre a \par man Follow the American Heart Association recommendations when you eat out, \par and keep an eye on your portion sizes.\par

## 2020-03-16 NOTE — HISTORY OF PRESENT ILLNESS
[FreeTextEntry1] : Patient here with several complaints  [de-identified] : smokes 3-5 cig /day for many years\par  is smoker and patient smokes with him\par also c/o hot flushes and occasional chest pain ,palpitaion,\par feels pressure on the left side ,mild SOB and pain scale is 1/10,no palpitaion  now but felt it earlier\par no aspirin\par diabetes sees endo\par hypertriglyceridemia\par \par 3/11/2020-  Patient states that 10 days ago she woke up feeling disoriented and overheated. She felt like she was going to loose consciousness but did not. She decided to lay on her kitchen floor to help alleviate symptoms and when she stood back up she felt disoriented and over heated again. This lasted for about 15- 20 minutes. The next day she started to have neck pain that radiated down her back. She states it felt like her neck was broken and found it hard to breath. \par She also c/o a lump on her tongue. She is also now complaining of left shoulder and left hip pain. She states she got bitten by a tick last summer. No bulls eye rash. No recent injuries or trauma. \par In addition she has lost unintentional weight. Could be stress related as she started a new job. \par She denies chest pain, SOB, nausea, vomiting, diarrhea, lower extremity edema, and abd pain. She denies feeling depressed or anxious.\par

## 2020-05-07 ENCOUNTER — APPOINTMENT (OUTPATIENT)
Dept: ENDOCRINOLOGY | Facility: CLINIC | Age: 55
End: 2020-05-07

## 2020-06-09 ENCOUNTER — APPOINTMENT (OUTPATIENT)
Dept: ENDOCRINOLOGY | Facility: CLINIC | Age: 55
End: 2020-06-09

## 2020-08-04 ENCOUNTER — APPOINTMENT (OUTPATIENT)
Dept: ENDOCRINOLOGY | Facility: CLINIC | Age: 55
End: 2020-08-04

## 2020-09-04 ENCOUNTER — RX RENEWAL (OUTPATIENT)
Age: 55
End: 2020-09-04

## 2020-11-13 ENCOUNTER — TRANSCRIPTION ENCOUNTER (OUTPATIENT)
Age: 55
End: 2020-11-13

## 2020-12-04 ENCOUNTER — RX RENEWAL (OUTPATIENT)
Age: 55
End: 2020-12-04

## 2021-01-06 ENCOUNTER — RX RENEWAL (OUTPATIENT)
Age: 56
End: 2021-01-06

## 2021-01-06 NOTE — END OF VISIT
[FreeTextEntry3] : All medical entries made by the Scribe were at my, Dr. Valle's, direction and personally dictated by me on [3/11/2020]. I have reviewed the chart and agree that the record accurately reflects my personal performance of the history, physical exam, assessment and plan. I have also personally directed, reviewed, and agreed with the chart.\par 
no

## 2021-01-19 ENCOUNTER — APPOINTMENT (OUTPATIENT)
Dept: ENDOCRINOLOGY | Facility: CLINIC | Age: 56
End: 2021-01-19
Payer: COMMERCIAL

## 2021-01-19 VITALS
DIASTOLIC BLOOD PRESSURE: 86 MMHG | SYSTOLIC BLOOD PRESSURE: 130 MMHG | HEART RATE: 102 BPM | HEIGHT: 66 IN | BODY MASS INDEX: 28.12 KG/M2 | WEIGHT: 175 LBS | OXYGEN SATURATION: 99 %

## 2021-01-19 LAB — GLUCOSE BLDC GLUCOMTR-MCNC: 290

## 2021-01-19 PROCEDURE — 99214 OFFICE O/P EST MOD 30 MIN: CPT | Mod: 25

## 2021-01-19 PROCEDURE — 99072 ADDL SUPL MATRL&STAF TM PHE: CPT

## 2021-01-19 PROCEDURE — 82962 GLUCOSE BLOOD TEST: CPT

## 2021-01-19 RX ORDER — INSULIN ASPART 100 [IU]/ML
100 INJECTION, SOLUTION INTRAVENOUS; SUBCUTANEOUS
Qty: 6 | Refills: 1 | Status: DISCONTINUED | COMMUNITY
Start: 2019-01-28 | End: 2021-01-19

## 2021-01-19 NOTE — PHYSICAL EXAM
[Healthy Appearance] : healthy appearance [Normal Sclera/Conjunctiva] : normal sclera/conjunctiva [No Acute Distress] : no acute distress [No Proptosis] : no proptosis [No Neck Mass] : no neck mass was observed [No LAD] : no lymphadenopathy [Supple] : the neck was supple [Thyroid Not Enlarged] : the thyroid was not enlarged [No Thyroid Nodules] : no palpable thyroid nodules [No Respiratory Distress] : no respiratory distress [No Murmurs] : no murmurs [Normal S1, S2] : normal S1 and S2 [Normal Rate] : heart rate was normal [No Edema] : no peripheral edema [Regular Rhythm] : with a regular rhythm [Right Foot Was Examined] : right foot ~C was examined [Left Foot Was Examined] : left foot ~C was examined [Normal] : normal [2+] : 2+ in the dorsalis pedis [Normal Affect] : the affect was normal [Normal Insight/Judgement] : insight and judgment were intact [Normal Mood] : the mood was normal [Acanthosis Nigricans] : no acanthosis nigricans [Diminished Throughout Both Feet] : normal tactile sensation with monofilament testing throughout both feet [FreeTextEntry2] : callus [FreeTextEntry6] : callus

## 2021-01-19 NOTE — HISTORY OF PRESENT ILLNESS
[FreeTextEntry1] : Follow up DM, hypothyroidism and hyperlipidemia.\par Quality:  type 1 DM\par Severity:  moderate\par Duration of diabetes:  since 2000\par Associated Complications/ Symptoms:  retinopathy\par Modifying Factors:  Better with insulin \par \par Patient tests blood glucose 4 times per day.  No recent hypoglycemia.\par Tried Sushil in the past, but found this inaccurate.   \par \par Current Diabetic Medication Regimen:\par Used Omnipod insulin pump in the past, now off due to high cost of supplies.\par Tresiba 38 units qhs\par Novolog IC ratio 1:5, ISF 1:50\par Used Dexcom in the past, but poor insurance coverage.\par \par  \par

## 2021-01-19 NOTE — ASSESSMENT
[FreeTextEntry1] : 53 year old female with type 1 DM, Hypothyroidism, Hyperlipidemia and HTN.  Her glycemic control remains above goal.\par \par 1.  T1DM-   Check A1c now.   Advised use of CGM to better adjust insulin.  Willing to try Sushil 2 CGM.  Based on review of CGM can adjust insulin doses.  \par 2.  hypothyroidism-  Continue LT4, check TFTs\par 3.  Hyperlipidemia-  continue statin\par 4.  HTN-   continue losartan\par 5.  vitamin D deficiency-   continue Rx, check level now.

## 2021-02-09 ENCOUNTER — TRANSCRIPTION ENCOUNTER (OUTPATIENT)
Age: 56
End: 2021-02-09

## 2021-04-13 ENCOUNTER — RX RENEWAL (OUTPATIENT)
Age: 56
End: 2021-04-13

## 2021-04-30 ENCOUNTER — RX RENEWAL (OUTPATIENT)
Age: 56
End: 2021-04-30

## 2021-05-13 ENCOUNTER — APPOINTMENT (OUTPATIENT)
Dept: ENDOCRINOLOGY | Facility: CLINIC | Age: 56
End: 2021-05-13

## 2021-06-18 LAB
HBA1C MFR BLD HPLC: 9.6
LDLC SERPL DIRECT ASSAY-MCNC: 128
MICROALBUMIN/CREAT 24H UR-RTO: 4

## 2021-06-21 ENCOUNTER — APPOINTMENT (OUTPATIENT)
Dept: ENDOCRINOLOGY | Facility: CLINIC | Age: 56
End: 2021-06-21
Payer: COMMERCIAL

## 2021-06-21 VITALS
WEIGHT: 180 LBS | SYSTOLIC BLOOD PRESSURE: 142 MMHG | HEIGHT: 66 IN | BODY MASS INDEX: 28.93 KG/M2 | OXYGEN SATURATION: 99 % | DIASTOLIC BLOOD PRESSURE: 80 MMHG | HEART RATE: 96 BPM

## 2021-06-21 LAB — GLUCOSE BLDC GLUCOMTR-MCNC: 182

## 2021-06-21 PROCEDURE — 82962 GLUCOSE BLOOD TEST: CPT

## 2021-06-21 PROCEDURE — 99214 OFFICE O/P EST MOD 30 MIN: CPT | Mod: 25

## 2021-06-21 NOTE — REVIEW OF SYSTEMS
[Recent Weight Gain (___ Lbs)] : recent weight gain: [unfilled] lbs [Palpitations] : palpitations [Chest Pain] : no chest pain [Shortness Of Breath] : no shortness of breath [Pain/Numbness of Digits] : no pain/numbness of digits

## 2021-06-21 NOTE — HISTORY OF PRESENT ILLNESS
[FreeTextEntry1] : Follow up DM, hypothyroidism and hyperlipidemia.\par \par Quality:  type 1 DM\par Severity:  moderate\par Duration of diabetes:  since 2000\par Associated Complications/ Symptoms:  retinopathy\par Modifying Factors:  Better with insulin \par \par Patient tests blood glucose 4 times per day.    Has been lax with testing lately. (Using old PDM to test and calculate insulin doses:  average BG is 164 mg/dl over past 30 days).    \par Tried Libre2, but does not like to wear sensor.  \par \par Current Diabetic Medication Regimen:\par Used Omnipod insulin pump in the past, now off due to high cost of supplies.\par Tresiba 38 units qhs\par Novolog IC ratio 1:5, ISF 1:50\par Used Dexcom in the past, but poor insurance coverage.\par \par  \par

## 2021-06-21 NOTE — ASSESSMENT
[FreeTextEntry1] : 55 year old female with type 1 DM, Hypothyroidism, Hyperlipidemia and HTN.    Her diabetes control is suboptimal.\par \par 1.  T1DM-     Increase SMBG to at least 4 times a day.  Does not want to try CGM.    Check A1c now.   \par 2.  hypothyroidism-  Continue LT4, check TFTs\par 3.  Hyperlipidemia-  continue statin\par 4.  HTN-   continue losartan\par 5.  vitamin D deficiency-   continue Rx, check level now. \par Recommended cardiac eval due to c/o palpitations.

## 2021-06-21 NOTE — PHYSICAL EXAM
[Healthy Appearance] : healthy appearance [No Acute Distress] : no acute distress [Normal Sclera/Conjunctiva] : normal sclera/conjunctiva [No Proptosis] : no proptosis [No Neck Mass] : no neck mass was observed [No LAD] : no lymphadenopathy [Supple] : the neck was supple [Thyroid Not Enlarged] : the thyroid was not enlarged [No Thyroid Nodules] : no palpable thyroid nodules [No Respiratory Distress] : no respiratory distress [Normal S1, S2] : normal S1 and S2 [No Murmurs] : no murmurs [Normal Rate] : heart rate was normal [Regular Rhythm] : with a regular rhythm [No Edema] : no peripheral edema [Acanthosis Nigricans] : no acanthosis nigricans [Normal Affect] : the affect was normal [Normal Mood] : the mood was normal [Normal Insight/Judgement] : insight and judgment were intact

## 2021-10-07 ENCOUNTER — TRANSCRIPTION ENCOUNTER (OUTPATIENT)
Age: 56
End: 2021-10-07

## 2021-10-29 ENCOUNTER — TRANSCRIPTION ENCOUNTER (OUTPATIENT)
Age: 56
End: 2021-10-29

## 2021-11-08 ENCOUNTER — RX RENEWAL (OUTPATIENT)
Age: 56
End: 2021-11-08

## 2022-03-21 ENCOUNTER — RX RENEWAL (OUTPATIENT)
Age: 57
End: 2022-03-21

## 2022-04-26 ENCOUNTER — APPOINTMENT (OUTPATIENT)
Dept: ENDOCRINOLOGY | Facility: CLINIC | Age: 57
End: 2022-04-26
Payer: COMMERCIAL

## 2022-04-26 VITALS
SYSTOLIC BLOOD PRESSURE: 140 MMHG | HEART RATE: 90 BPM | BODY MASS INDEX: 24.92 KG/M2 | HEIGHT: 72 IN | WEIGHT: 184 LBS | DIASTOLIC BLOOD PRESSURE: 90 MMHG

## 2022-04-26 LAB — GLUCOSE BLDC GLUCOMTR-MCNC: 179

## 2022-04-26 PROCEDURE — 82962 GLUCOSE BLOOD TEST: CPT

## 2022-04-26 PROCEDURE — 99214 OFFICE O/P EST MOD 30 MIN: CPT | Mod: 25

## 2022-04-26 RX ORDER — ERGOCALCIFEROL 1.25 MG/1
1.25 MG CAPSULE, LIQUID FILLED ORAL
Qty: 12 | Refills: 0 | Status: DISCONTINUED | COMMUNITY
Start: 2016-11-28 | End: 2022-04-26

## 2022-04-26 RX ORDER — FLASH GLUCOSE SCANNING READER
EACH MISCELLANEOUS
Qty: 1 | Refills: 0 | Status: DISCONTINUED | COMMUNITY
Start: 2021-01-19 | End: 2022-04-26

## 2022-04-26 RX ORDER — FLASH GLUCOSE SENSOR
KIT MISCELLANEOUS
Qty: 2 | Refills: 5 | Status: DISCONTINUED | COMMUNITY
Start: 2021-01-19 | End: 2022-04-26

## 2022-04-26 NOTE — ASSESSMENT
[FreeTextEntry1] : 56 year old female with type 1 DM, Hypothyroidism, Hyperlipidemia and HTN.    Her diabetes control is suboptimal.\par \par 1.  T1DM-  Decrease Tresiba to 34 units qhs and take Novolog before all meals to reduce PP hyperglycemia.  Encouraged patient to reconsider insulin pump therapy and discussed advantages of hybrid closed loop therapy such as Tslim or Omnipod 5.  \par 2.  hypothyroidism-  Continue LT4 \par 3.  Hyperlipidemia-  LDL is suboptimal on Atorvastatin.  Will change to Rosuvastatin 40 mg daily.    \par 4.  HTN-   continue losartan.  If next BP is still elevated, will increase dose of Losartan.  \par 5.  vitamin D deficiency-   stop Rx  due to high level of 25(OH)D.

## 2022-04-26 NOTE — HISTORY OF PRESENT ILLNESS
[FreeTextEntry1] : Follow up DM, hypothyroidism and hyperlipidemia.\par \par Quality:  type 1 DM\par Severity:  moderate\par Duration of diabetes:  since 2000\par Associated Complications/ Symptoms:  retinopathy\par Modifying Factors:  Better with insulin \par \par Patient tests blood glucose 4 times per day.    Having some episodes of AM hypoglycemia.  \par Uses old PDM to test and calculate insulin doses   \par Tried Libre2, but does not like to wear sensor.   Had poor coverage of Dexcom. \par \par Current Diabetic Medication Regimen:\par Used Omnipod insulin pump in the past, now off due to difficulty getting supplies supplies.\par Tresiba 38 units qhs\par Novolog IC ratio 1:5, ISF 1:50- admits to often taking this after meals.  \par  \par  Does have some hypoglycemic unawareness.  \par  \par

## 2022-04-26 NOTE — DATA REVIEWED
[FreeTextEntry1] : LABS:\par 3/16/2022:\par UACR < 0.2\par Trig 205\par \par A1c 8.9%\par TSH 1.84\par 25(OH)D  117

## 2022-05-02 ENCOUNTER — RX RENEWAL (OUTPATIENT)
Age: 57
End: 2022-05-02

## 2022-05-19 ENCOUNTER — APPOINTMENT (OUTPATIENT)
Dept: FAMILY MEDICINE | Facility: CLINIC | Age: 57
End: 2022-05-19

## 2022-07-19 ENCOUNTER — APPOINTMENT (OUTPATIENT)
Dept: GASTROENTEROLOGY | Facility: CLINIC | Age: 57
End: 2022-07-19
Payer: COMMERCIAL

## 2022-07-19 VITALS
HEIGHT: 66 IN | OXYGEN SATURATION: 98 % | RESPIRATION RATE: 16 BRPM | BODY MASS INDEX: 29.57 KG/M2 | WEIGHT: 184 LBS | HEART RATE: 113 BPM | DIASTOLIC BLOOD PRESSURE: 90 MMHG | SYSTOLIC BLOOD PRESSURE: 140 MMHG

## 2022-07-19 DIAGNOSIS — D17.20 BENIGN LIPOMATOUS NEOPLASM OF SKIN AND SUBCUTANEOUS TISSUE OF UNSPECIFIED LIMB: ICD-10-CM

## 2022-07-19 PROCEDURE — 99204 OFFICE O/P NEW MOD 45 MIN: CPT

## 2022-07-19 RX ORDER — AZITHROMYCIN 250 MG/1
250 TABLET, FILM COATED ORAL
Qty: 6 | Refills: 0 | Status: COMPLETED | COMMUNITY
Start: 2022-06-13

## 2022-07-19 NOTE — HISTORY OF PRESENT ILLNESS
[de-identified] : The patient has a history of chronic constipation and use her average bowel movement every 3 days when last seen in December 2017.  At that time I scheduled her for screening colonoscopy which was performed in February 2018.  A 1 cm sessile tubular adenoma was removed in the proximal transverse colon and a 4 mm hyperplastic polyp was removed from the sigmoid colon.  There were also small hemorrhoids present.  I recommended a follow-up exam in 3 years.  Constipation has been worse and she will not have a bowel movement without taking some type of laxative such as senna.  More recently she has been taking 1 tablet daily to which she has become somewhat refractory to this supplement as well.  There is no rectal bleeding or melena.  She denies any nausea or vomiting.  Her appetite and weight are stable.

## 2022-07-19 NOTE — PHYSICAL EXAM
[General Appearance - Alert] : alert [General Appearance - In No Acute Distress] : in no acute distress [General Appearance - Well Nourished] : well nourished [General Appearance - Well Developed] : well developed [General Appearance - Well-Appearing] : healthy appearing [Sclera] : the sclera and conjunctiva were normal [PERRL With Normal Accommodation] : pupils were equal in size, round, and reactive to light [Extraocular Movements] : extraocular movements were intact [Outer Ear] : the ears and nose were normal in appearance [Hearing Threshold Finger Rub Not [place]] : hearing was normal [Examination Of The Oral Cavity] : the lips and gums were normal [Oropharynx] : the oropharynx was normal [Neck Appearance] : the appearance of the neck was normal [Auscultation Breath Sounds / Voice Sounds] : lungs were clear to auscultation bilaterally [Heart Rate And Rhythm] : heart rate was normal and rhythm regular [Heart Sounds] : normal S1 and S2 [Heart Sounds Gallop] : no gallops [Murmurs] : no murmurs [Heart Sounds Pericardial Friction Rub] : no pericardial rub [Bowel Sounds] : normal bowel sounds [Abdomen Soft] : soft [Abdomen Tenderness] : non-tender [Abdomen Mass (___ Cm)] : no abdominal mass palpated [No CVA Tenderness] : no ~M costovertebral angle tenderness [No Spinal Tenderness] : no spinal tenderness [Abnormal Walk] : normal gait [Nail Clubbing] : no clubbing  or cyanosis of the fingernails [Skin Color & Pigmentation] : normal skin color and pigmentation [Skin Turgor] : normal skin turgor [] : no rash [Motor Exam] : the motor exam was normal [No Focal Deficits] : no focal deficits [Oriented To Time, Place, And Person] : oriented to person, place, and time [Impaired Insight] : insight and judgment were intact [Affect] : the affect was normal [FreeTextEntry1] : overweight

## 2022-07-19 NOTE — ASSESSMENT
[FreeTextEntry1] : At this time I have recommended that she start Linzess 290 mcg p.o. every morning.  She is also somewhat overdue for colonoscopy which will be scheduled.  She will obtain a CBC, basic metabolic profile and thyroid function test as well as prothrombin time prior.  Further recommendations will depend upon her response to therapy and the results of the colonoscopy. The risks, benefits, complications and possible adverse consequences associated with colonoscopy were discussed with the patient.\par

## 2022-08-16 ENCOUNTER — TRANSCRIPTION ENCOUNTER (OUTPATIENT)
Age: 57
End: 2022-08-16

## 2022-08-23 LAB — SARS-COV-2 N GENE NPH QL NAA+PROBE: NOT DETECTED

## 2022-08-25 ENCOUNTER — TRANSCRIPTION ENCOUNTER (OUTPATIENT)
Age: 57
End: 2022-08-25

## 2022-08-26 ENCOUNTER — APPOINTMENT (OUTPATIENT)
Dept: GASTROENTEROLOGY | Facility: GI CENTER | Age: 57
End: 2022-08-26
Payer: COMMERCIAL

## 2022-08-26 ENCOUNTER — RESULT REVIEW (OUTPATIENT)
Age: 57
End: 2022-08-26

## 2022-08-26 ENCOUNTER — OUTPATIENT (OUTPATIENT)
Dept: OUTPATIENT SERVICES | Facility: HOSPITAL | Age: 57
LOS: 1 days | End: 2022-08-26
Payer: COMMERCIAL

## 2022-08-26 DIAGNOSIS — K59.00 CONSTIPATION, UNSPECIFIED: ICD-10-CM

## 2022-08-26 DIAGNOSIS — Z12.11 ENCOUNTER FOR SCREENING FOR MALIGNANT NEOPLASM OF COLON: ICD-10-CM

## 2022-08-26 DIAGNOSIS — Z86.010 PERSONAL HISTORY OF COLONIC POLYPS: ICD-10-CM

## 2022-08-26 PROCEDURE — 88305 TISSUE EXAM BY PATHOLOGIST: CPT | Mod: 26

## 2022-08-26 PROCEDURE — 88305 TISSUE EXAM BY PATHOLOGIST: CPT

## 2022-08-26 PROCEDURE — 45385 COLONOSCOPY W/LESION REMOVAL: CPT | Mod: 33

## 2022-08-26 PROCEDURE — 45380 COLONOSCOPY AND BIOPSY: CPT

## 2022-08-26 NOTE — PHYSICAL EXAM
[General Appearance - Alert] : alert [General Appearance - In No Acute Distress] : in no acute distress [General Appearance - Well Nourished] : well nourished [General Appearance - Well Developed] : well developed [General Appearance - Well-Appearing] : healthy appearing [Sclera] : the sclera and conjunctiva were normal [PERRL With Normal Accommodation] : pupils were equal in size, round, and reactive to light [Extraocular Movements] : extraocular movements were intact [Outer Ear] : the ears and nose were normal in appearance [Hearing Threshold Finger Rub Not Boyle] : hearing was normal [Examination Of The Oral Cavity] : the lips and gums were normal [Oropharynx] : the oropharynx was normal [Neck Appearance] : the appearance of the neck was normal [Auscultation Breath Sounds / Voice Sounds] : lungs were clear to auscultation bilaterally [Heart Rate And Rhythm] : heart rate was normal and rhythm regular [Heart Sounds] : normal S1 and S2 [Heart Sounds Gallop] : no gallops [Murmurs] : no murmurs [Heart Sounds Pericardial Friction Rub] : no pericardial rub [Bowel Sounds] : normal bowel sounds [Abdomen Soft] : soft [Abdomen Tenderness] : non-tender [Abdomen Mass (___ Cm)] : no abdominal mass palpated [No CVA Tenderness] : no ~M costovertebral angle tenderness [No Spinal Tenderness] : no spinal tenderness [Abnormal Walk] : normal gait [Nail Clubbing] : no clubbing  or cyanosis of the fingernails [Skin Color & Pigmentation] : normal skin color and pigmentation [Skin Turgor] : normal skin turgor [] : no rash [Motor Exam] : the motor exam was normal [No Focal Deficits] : no focal deficits [Oriented To Time, Place, And Person] : oriented to person, place, and time [Impaired Insight] : insight and judgment were intact [Affect] : the affect was normal [FreeTextEntry1] : overweight

## 2022-08-30 ENCOUNTER — NON-APPOINTMENT (OUTPATIENT)
Age: 57
End: 2022-08-30

## 2022-08-30 ENCOUNTER — APPOINTMENT (OUTPATIENT)
Dept: OBGYN | Facility: CLINIC | Age: 57
End: 2022-08-30
Payer: COMMERCIAL

## 2022-08-30 VITALS
HEIGHT: 66 IN | WEIGHT: 184 LBS | BODY MASS INDEX: 29.57 KG/M2 | DIASTOLIC BLOOD PRESSURE: 89 MMHG | SYSTOLIC BLOOD PRESSURE: 136 MMHG

## 2022-08-30 DIAGNOSIS — Z01.419 ENCOUNTER FOR GYNECOLOGICAL EXAMINATION (GENERAL) (ROUTINE) W/OUT ABNORMAL FINDINGS: ICD-10-CM

## 2022-08-30 DIAGNOSIS — Z87.19 PERSONAL HISTORY OF OTHER DISEASES OF THE DIGESTIVE SYSTEM: ICD-10-CM

## 2022-08-30 DIAGNOSIS — Z86.39 PERSONAL HISTORY OF OTHER ENDOCRINE, NUTRITIONAL AND METABOLIC DISEASE: ICD-10-CM

## 2022-08-30 DIAGNOSIS — Z00.00 ENCOUNTER FOR GENERAL ADULT MEDICAL EXAMINATION W/OUT ABNORMAL FINDINGS: ICD-10-CM

## 2022-08-30 DIAGNOSIS — Z78.0 ASYMPTOMATIC MENOPAUSAL STATE: ICD-10-CM

## 2022-08-30 LAB — SURGICAL PATHOLOGY STUDY: SIGNIFICANT CHANGE UP

## 2022-08-30 PROCEDURE — 99386 PREV VISIT NEW AGE 40-64: CPT

## 2022-08-30 RX ORDER — IBUPROFEN 800 MG/1
800 TABLET, FILM COATED ORAL
Qty: 28 | Refills: 0 | Status: DISCONTINUED | COMMUNITY
Start: 2022-06-13 | End: 2022-08-30

## 2022-08-30 RX ORDER — SODIUM SULFATE, POTASSIUM SULFATE, MAGNESIUM SULFATE 17.5; 3.13; 1.6 G/ML; G/ML; G/ML
17.5-3.13-1.6 SOLUTION, CONCENTRATE ORAL
Qty: 1 | Refills: 0 | Status: DISCONTINUED | COMMUNITY
Start: 2022-07-19 | End: 2022-08-30

## 2022-08-30 RX ORDER — ATORVASTATIN CALCIUM 40 MG/1
40 TABLET, FILM COATED ORAL
Qty: 90 | Refills: 1 | Status: DISCONTINUED | COMMUNITY
Start: 2017-04-12 | End: 2022-08-30

## 2022-08-30 NOTE — HISTORY OF PRESENT ILLNESS
[Y] : Positive pregnancy history [Menarche Age: ____] : age at menarche was [unfilled] [Menopause Age: ____] : age at menopause was [unfilled] [Mammogramdate] : 08/22 [ColonoscopyDate] : 08/22 [PGHxTotal] : 3 [[facility]] : 3 [PGHxPremature] : 0 [PGHxAbortions] : 0 [[facility] Health Servicesiving] : 3 [PGHxABInduced] : 0 [PGHxABSpont] : 0 [PGHxEctopic] : 0 [PGHxMultBirths] : 0

## 2022-08-31 LAB — HPV HIGH+LOW RISK DNA PNL CVX: NOT DETECTED

## 2022-09-06 ENCOUNTER — RX RENEWAL (OUTPATIENT)
Age: 57
End: 2022-09-06

## 2022-09-06 LAB — CYTOLOGY CVX/VAG DOC THIN PREP: NORMAL

## 2022-11-21 ENCOUNTER — RX RENEWAL (OUTPATIENT)
Age: 57
End: 2022-11-21

## 2022-11-23 ENCOUNTER — APPOINTMENT (OUTPATIENT)
Dept: ENDOCRINOLOGY | Facility: CLINIC | Age: 57
End: 2022-11-23

## 2022-12-05 ENCOUNTER — RX RENEWAL (OUTPATIENT)
Age: 57
End: 2022-12-05

## 2023-02-16 ENCOUNTER — APPOINTMENT (OUTPATIENT)
Dept: ENDOCRINOLOGY | Facility: CLINIC | Age: 58
End: 2023-02-16

## 2023-02-17 ENCOUNTER — RX RENEWAL (OUTPATIENT)
Age: 58
End: 2023-02-17

## 2023-02-28 LAB
HBA1C MFR BLD HPLC: 8.9
LDLC SERPL DIRECT ASSAY-MCNC: 113
MICROALBUMIN/CREAT 24H UR-RTO: 2
TSH SERPL-ACNC: 0.83

## 2023-03-24 NOTE — REASON FOR VISIT
Problem: Falls - Risk of  Goal: *Absence of Falls  Description: Document Taniya Garza Fall Risk and appropriate interventions in the flowsheet. Outcome: Progressing Towards Goal  Note: Fall Risk Interventions:                                Problem: Patient Education: Go to Patient Education Activity  Goal: Patient/Family Education  Outcome: Progressing Towards Goal     Problem: Patient Education: Go to Patient Education Activity  Goal: Patient/Family Education  Outcome: Progressing Towards Goal     Problem: Pressure Injury - Risk of  Goal: *Prevention of pressure injury  Description: Document Oumar Scale and appropriate interventions in the flowsheet.   Outcome: Progressing Towards Goal  Note: Pressure Injury Interventions:  Sensory Interventions: Assess changes in LOC    Moisture Interventions: Absorbent underpads, Apply protective barrier, creams and emollients, Check for incontinence Q2 hours and as needed    Activity Interventions: Increase time out of bed, Pressure redistribution bed/mattress(bed type)    Mobility Interventions: Pressure redistribution bed/mattress (bed type)    Nutrition Interventions: Document food/fluid/supplement intake, Offer support with meals,snacks and hydration    Friction and Shear Interventions: Apply protective barrier, creams and emollients, Minimize layers                Problem: Patient Education: Go to Patient Education Activity  Goal: Patient/Family Education  Outcome: Progressing Towards Goal     Problem: Nutrition Deficit  Goal: *Optimize nutritional status  Outcome: Progressing Towards Goal     Problem: Patient Education: Go to Patient Education Activity  Goal: Patient/Family Education  Outcome: Progressing Towards Goal [Consultation] : a consultation visit [FreeTextEntry1] : Prior colon polyps and constipation

## 2023-04-26 ENCOUNTER — RESULT CHARGE (OUTPATIENT)
Age: 58
End: 2023-04-26

## 2023-04-26 ENCOUNTER — APPOINTMENT (OUTPATIENT)
Dept: ENDOCRINOLOGY | Facility: CLINIC | Age: 58
End: 2023-04-26
Payer: COMMERCIAL

## 2023-04-26 VITALS
HEART RATE: 100 BPM | OXYGEN SATURATION: 98 % | DIASTOLIC BLOOD PRESSURE: 74 MMHG | WEIGHT: 180 LBS | BODY MASS INDEX: 28.93 KG/M2 | SYSTOLIC BLOOD PRESSURE: 122 MMHG | HEIGHT: 66 IN

## 2023-04-26 LAB
GLUCOSE BLDC GLUCOMTR-MCNC: 292
GLUCOSE BLDC GLUCOMTR-MCNC: 303

## 2023-04-26 PROCEDURE — 82962 GLUCOSE BLOOD TEST: CPT

## 2023-04-26 PROCEDURE — 99214 OFFICE O/P EST MOD 30 MIN: CPT | Mod: 25

## 2023-04-26 RX ORDER — PEN NEEDLE, DIABETIC 29 G X1/2"
32G X 4 MM NEEDLE, DISPOSABLE MISCELLANEOUS
Qty: 4 | Refills: 1 | Status: ACTIVE | COMMUNITY
Start: 2019-04-09 | End: 1900-01-01

## 2023-04-26 NOTE — ASSESSMENT
[FreeTextEntry1] : T1DM\par -Long discussion had with patient. She is not interested in CGM or Pump. She does know she needs to do better with pre meal boluses and carb counting. Knows what she has to do , just has to do it. Also needs to move her injection sites around. \par -Continue tresiba and novolog regimen for now, will most likely make adjustments when pt labs result \par -Continue to check BS 4-6x a day but bring logs to next apt\par -Have more balanced meals, does a lot of picking and snacking . Not interested in CDE \par -Increase exercise as tolerated, goal of 30 mins a day 5x a week\par \par -Motivated to quit smoking, will reinforce importance at upcoming visits\par \par \par HypoT\par Continue current dose of LT4, need updated TFTs\par \par \par HLD\par Continue statin, need updated lipid panel\par \par HTN\par Continue HCTZ and losartan, bp stable in office \par \par \par Vit D Def\par Continue on no supplement, need updated levels with labs\par \par Pt states she feels pressure in her left neck and also periods where her heart skips a beat- going to use [facility] to faciliate cardiology but also do a thyroid sonogram.\par \par Labs done today- will call with results \par RTO in 3 months

## 2023-04-26 NOTE — REVIEW OF SYSTEMS
[Palpitations] : palpitations [Fatigue] : no fatigue [Recent Weight Gain (___ Lbs)] : no recent weight gain [Recent Weight Loss (___ Lbs)] : no recent weight loss [Visual Field Defect] : no visual field defect [Blurred Vision] : no blurred vision [Dysphagia] : no dysphagia [Neck Pain] : no neck pain [Dysphonia] : no dysphonia [Constipation] : no constipation [Diarrhea] : no diarrhea [Polyuria] : no polyuria [Polydipsia] : no polydipsia [FreeTextEntry5] : "feels my heart skips a beat" [FreeTextEntry6] : "heaviness in breathing"

## 2023-04-26 NOTE — HISTORY OF PRESENT ILLNESS
[FreeTextEntry1] : Pt lost to follow up since 4/2022\par \par Type 1 DM\par Severity: Moderate\par Duration: since 2000\par Modifying Factors: now off insulin pump\par Associated Symptoms: retinopathy \par \par SMBG\par Checks 4-6x a day, often post meal\par On average fasting mid 100s \par On average during day often 200s, some 300s/400s\par In office 303 on 1 finger, 292 on another finger- 3 hours post meal- cottage cheese with fruit and sandwhich on rye \par Rare hypoglycemia\par \par Uses old PDM to test and calculate insulin doses \par Tried Libre2, but does not like to wear sensor. Had poor coverage of Dexcom. \par \par Current drug regimen- Using ominpod for only math calculations of insulin pump \par Tresiba 36 units QHS \par Novolog ICR 1:5 , ISF 1: 50 (injects 4-5x a more, more corrections the pre meal) \par \par Eye exam: has not gone in 2019, goes to [facility] in [place]- overdue \par Foot exam: 5/2019- saw podiatry for in grown nail- denies numbness.tingling -overdue \par Kidney disease: denies\par Heart disease: denies\par \par Weight: feels she gained weight in her clothes but scale doesn’t reflect \par Diet: does not eat breakfast and picks on crackers for lunch, then eats dinner (protein, veggie, carb)\par pt cooks, does not eat out a lot \par has a secruity cookie to prevent low blood sugars at night \par Exercise: does not exercise \par Smoking: smokes less than a pack day- motivated to quit \par \par HypoT\par LT4 137 mcg\par Patient advised to take every day in the morning, on an empty stomach, and with no other medications. \par \par Vit D \par On no supplement, was too high in past \par \par HLD\par Need updated lipid panel\par Rosuvastatin 40 mg daily \par \par HTN\par BP in office 122/74\par HCTZ 25 mcg\par Losartan 50 mg daily

## 2023-05-02 RX ORDER — BLOOD-GLUCOSE METER
W/DEVICE KIT MISCELLANEOUS
Qty: 1 | Refills: 0 | Status: ACTIVE | COMMUNITY
Start: 2023-05-02 | End: 1900-01-01

## 2023-05-02 RX ORDER — LANCETS
EACH MISCELLANEOUS
Qty: 6 | Refills: 1 | Status: ACTIVE | COMMUNITY
Start: 2023-05-02 | End: 1900-01-01

## 2023-05-06 ENCOUNTER — APPOINTMENT (OUTPATIENT)
Dept: ULTRASOUND IMAGING | Facility: CLINIC | Age: 58
End: 2023-05-06
Payer: COMMERCIAL

## 2023-05-06 ENCOUNTER — OUTPATIENT (OUTPATIENT)
Dept: OUTPATIENT SERVICES | Facility: HOSPITAL | Age: 58
LOS: 1 days | End: 2023-05-06

## 2023-05-06 DIAGNOSIS — E78.00 PURE HYPERCHOLESTEROLEMIA, UNSPECIFIED: ICD-10-CM

## 2023-05-06 PROCEDURE — 76536 US EXAM OF HEAD AND NECK: CPT | Mod: 26

## 2023-05-08 ENCOUNTER — APPOINTMENT (OUTPATIENT)
Dept: CARDIOLOGY | Facility: CLINIC | Age: 58
End: 2023-05-08
Payer: COMMERCIAL

## 2023-05-08 VITALS
HEIGHT: 66 IN | HEART RATE: 97 BPM | BODY MASS INDEX: 29.09 KG/M2 | WEIGHT: 181 LBS | OXYGEN SATURATION: 97 % | SYSTOLIC BLOOD PRESSURE: 136 MMHG | DIASTOLIC BLOOD PRESSURE: 82 MMHG

## 2023-05-08 DIAGNOSIS — R06.02 SHORTNESS OF BREATH: ICD-10-CM

## 2023-05-08 DIAGNOSIS — R00.2 PALPITATIONS: ICD-10-CM

## 2023-05-08 PROCEDURE — 99204 OFFICE O/P NEW MOD 45 MIN: CPT | Mod: 25

## 2023-05-08 PROCEDURE — 93000 ELECTROCARDIOGRAM COMPLETE: CPT

## 2023-05-08 RX ORDER — LACTULOSE 10 G/15ML
10 SOLUTION ORAL
Qty: 1800 | Refills: 5 | Status: DISCONTINUED | COMMUNITY
Start: 2022-08-26 | End: 2023-05-08

## 2023-05-08 RX ORDER — LINACLOTIDE 290 UG/1
290 CAPSULE, GELATIN COATED ORAL
Qty: 30 | Refills: 5 | Status: DISCONTINUED | COMMUNITY
Start: 2022-07-19 | End: 2023-05-08

## 2023-05-16 RX ORDER — ROSUVASTATIN CALCIUM 40 MG/1
40 TABLET, FILM COATED ORAL
Qty: 90 | Refills: 0 | Status: DISCONTINUED | COMMUNITY
Start: 2022-04-26 | End: 2023-05-16

## 2023-05-24 ENCOUNTER — NON-APPOINTMENT (OUTPATIENT)
Age: 58
End: 2023-05-24

## 2023-06-06 ENCOUNTER — APPOINTMENT (OUTPATIENT)
Dept: CARDIOLOGY | Facility: CLINIC | Age: 58
End: 2023-06-06
Payer: COMMERCIAL

## 2023-06-06 PROCEDURE — 93015 CV STRESS TEST SUPVJ I&R: CPT

## 2023-06-06 PROCEDURE — 78452 HT MUSCLE IMAGE SPECT MULT: CPT

## 2023-06-06 PROCEDURE — 93306 TTE W/DOPPLER COMPLETE: CPT

## 2023-06-06 PROCEDURE — A9500: CPT

## 2023-06-19 ENCOUNTER — NON-APPOINTMENT (OUTPATIENT)
Age: 58
End: 2023-06-19

## 2023-06-21 ENCOUNTER — RX RENEWAL (OUTPATIENT)
Age: 58
End: 2023-06-21

## 2023-07-27 ENCOUNTER — APPOINTMENT (OUTPATIENT)
Dept: ENDOCRINOLOGY | Facility: CLINIC | Age: 58
End: 2023-07-27

## 2023-08-21 ENCOUNTER — APPOINTMENT (OUTPATIENT)
Dept: CARDIOLOGY | Facility: CLINIC | Age: 58
End: 2023-08-21

## 2023-09-04 ENCOUNTER — RX RENEWAL (OUTPATIENT)
Age: 58
End: 2023-09-04

## 2023-09-07 NOTE — REASON FOR VISIT
[FreeTextEntry1] : 56 y/o F with PMH of DM1, Hypertension presents for cardiac eval for palpitations \par \par Patient notes that she has been having 3 weeks of palpitations, ongoing her there, intermittent, lately oon a daily basis, seconds, both activities and nothing \par Atorvastatin and Crestor, muscle aches and body aches \par shortness of breath - for 3weeks \par \par occasionaly palpitaitons - shortness of breath in the orning, tremor with swets, trace swelling towatrds the end of day  \par no orhtopnea or PND \par \par smoker: 8 cigarettes, 10 yrs \par Alcohol socially \par Family HX: Mother MVP \par muscle span in the rib cahaes in the arma \par  \par \par

## 2023-09-13 ENCOUNTER — RX RENEWAL (OUTPATIENT)
Age: 58
End: 2023-09-13

## 2023-11-01 ENCOUNTER — APPOINTMENT (OUTPATIENT)
Dept: ENDOCRINOLOGY | Facility: CLINIC | Age: 58
End: 2023-11-01
Payer: COMMERCIAL

## 2023-11-01 VITALS
HEART RATE: 96 BPM | HEIGHT: 66 IN | DIASTOLIC BLOOD PRESSURE: 78 MMHG | OXYGEN SATURATION: 96 % | SYSTOLIC BLOOD PRESSURE: 112 MMHG | WEIGHT: 180 LBS | BODY MASS INDEX: 28.93 KG/M2

## 2023-11-01 DIAGNOSIS — E10.65 TYPE 1 DIABETES MELLITUS WITH HYPERGLYCEMIA: ICD-10-CM

## 2023-11-01 LAB — GLUCOSE BLDC GLUCOMTR-MCNC: 353

## 2023-11-01 PROCEDURE — 82962 GLUCOSE BLOOD TEST: CPT

## 2023-11-01 PROCEDURE — 99214 OFFICE O/P EST MOD 30 MIN: CPT | Mod: 25

## 2023-11-01 RX ORDER — ROSUVASTATIN CALCIUM 5 MG/1
5 TABLET, FILM COATED ORAL
Qty: 90 | Refills: 3 | Status: ACTIVE | COMMUNITY
Start: 2023-11-01 | End: 1900-01-01

## 2023-11-14 ENCOUNTER — RX RENEWAL (OUTPATIENT)
Age: 58
End: 2023-11-14

## 2023-11-15 ENCOUNTER — NON-APPOINTMENT (OUTPATIENT)
Age: 58
End: 2023-11-15

## 2023-11-20 ENCOUNTER — APPOINTMENT (OUTPATIENT)
Dept: FAMILY MEDICINE | Facility: CLINIC | Age: 58
End: 2023-11-20

## 2023-11-20 ENCOUNTER — NON-APPOINTMENT (OUTPATIENT)
Age: 58
End: 2023-11-20

## 2023-11-20 ENCOUNTER — APPOINTMENT (OUTPATIENT)
Dept: FAMILY MEDICINE | Facility: CLINIC | Age: 58
End: 2023-11-20
Payer: COMMERCIAL

## 2023-11-20 VITALS
SYSTOLIC BLOOD PRESSURE: 102 MMHG | OXYGEN SATURATION: 99 % | WEIGHT: 180 LBS | HEIGHT: 66 IN | DIASTOLIC BLOOD PRESSURE: 64 MMHG | BODY MASS INDEX: 28.93 KG/M2 | HEART RATE: 90 BPM

## 2023-11-20 DIAGNOSIS — F17.200 NICOTINE DEPENDENCE, UNSPECIFIED, UNCOMPLICATED: ICD-10-CM

## 2023-11-20 DIAGNOSIS — R79.89 OTHER SPECIFIED ABNORMAL FINDINGS OF BLOOD CHEMISTRY: ICD-10-CM

## 2023-11-20 DIAGNOSIS — R05.9 COUGH, UNSPECIFIED: ICD-10-CM

## 2023-11-20 PROCEDURE — 99214 OFFICE O/P EST MOD 30 MIN: CPT | Mod: 25

## 2023-11-20 PROCEDURE — 99386 PREV VISIT NEW AGE 40-64: CPT | Mod: 25

## 2023-11-20 PROCEDURE — 99406 BEHAV CHNG SMOKING 3-10 MIN: CPT

## 2024-01-03 ENCOUNTER — APPOINTMENT (OUTPATIENT)
Dept: FAMILY MEDICINE | Facility: CLINIC | Age: 59
End: 2024-01-03

## 2024-01-10 RX ORDER — INSULIN LISPRO 100 [IU]/ML
100 INJECTION, SOLUTION INTRAVENOUS; SUBCUTANEOUS
Qty: 2 | Refills: 1 | Status: ACTIVE | COMMUNITY
Start: 2024-01-10 | End: 1900-01-01

## 2024-01-10 RX ORDER — INSULIN ASPART 100 [IU]/ML
100 INJECTION, SOLUTION INTRAVENOUS; SUBCUTANEOUS
Qty: 2 | Refills: 1 | Status: DISCONTINUED | COMMUNITY
Start: 2019-04-09 | End: 2024-01-10

## 2024-01-16 ENCOUNTER — RX RENEWAL (OUTPATIENT)
Age: 59
End: 2024-01-16

## 2024-02-05 ENCOUNTER — RX RENEWAL (OUTPATIENT)
Age: 59
End: 2024-02-05

## 2024-02-05 RX ORDER — LOSARTAN POTASSIUM 50 MG/1
50 TABLET, FILM COATED ORAL
Qty: 90 | Refills: 1 | Status: ACTIVE | COMMUNITY
Start: 2017-07-20 | End: 1900-01-01

## 2024-02-05 RX ORDER — LEVOTHYROXINE SODIUM 0.14 MG/1
137 TABLET ORAL
Qty: 90 | Refills: 1 | Status: ACTIVE | COMMUNITY
Start: 2020-09-04 | End: 1900-01-01

## 2024-02-07 ENCOUNTER — APPOINTMENT (OUTPATIENT)
Dept: ENDOCRINOLOGY | Facility: CLINIC | Age: 59
End: 2024-02-07

## 2024-03-01 RX ORDER — EZETIMIBE 10 MG/1
10 TABLET ORAL
Qty: 90 | Refills: 1 | Status: ACTIVE | COMMUNITY
Start: 2023-05-16

## 2024-03-26 RX ORDER — ICOSAPENT ETHYL 1 G/1
1 CAPSULE ORAL
Qty: 360 | Refills: 1 | Status: ACTIVE | COMMUNITY
Start: 2023-05-16

## 2024-04-18 RX ORDER — BLOOD SUGAR DIAGNOSTIC
STRIP MISCELLANEOUS
Qty: 600 | Refills: 1 | Status: ACTIVE | COMMUNITY
Start: 2019-03-04

## 2024-05-01 ENCOUNTER — APPOINTMENT (OUTPATIENT)
Dept: ENDOCRINOLOGY | Facility: CLINIC | Age: 59
End: 2024-05-01
Payer: COMMERCIAL

## 2024-05-01 VITALS
HEIGHT: 66 IN | HEART RATE: 103 BPM | OXYGEN SATURATION: 98 % | BODY MASS INDEX: 28.93 KG/M2 | SYSTOLIC BLOOD PRESSURE: 140 MMHG | DIASTOLIC BLOOD PRESSURE: 80 MMHG | WEIGHT: 180 LBS

## 2024-05-01 DIAGNOSIS — E10.9 TYPE 1 DIABETES MELLITUS W/OUT COMPLICATIONS: ICD-10-CM

## 2024-05-01 DIAGNOSIS — E03.9 HYPOTHYROIDISM, UNSPECIFIED: ICD-10-CM

## 2024-05-01 DIAGNOSIS — E55.9 VITAMIN D DEFICIENCY, UNSPECIFIED: ICD-10-CM

## 2024-05-01 DIAGNOSIS — E78.00 PURE HYPERCHOLESTEROLEMIA, UNSPECIFIED: ICD-10-CM

## 2024-05-01 DIAGNOSIS — I10 ESSENTIAL (PRIMARY) HYPERTENSION: ICD-10-CM

## 2024-05-01 LAB
GLUCOSE BLDC GLUCOMTR-MCNC: 179
HBA1C MFR BLD HPLC: 9.7
LDLC SERPL DIRECT ASSAY-MCNC: 146
MICROALBUMIN/CREAT 24H UR-RTO: NORMAL
TSH SERPL-ACNC: 0.04

## 2024-05-01 PROCEDURE — G2211 COMPLEX E/M VISIT ADD ON: CPT | Mod: NC,1L

## 2024-05-01 PROCEDURE — 82962 GLUCOSE BLOOD TEST: CPT

## 2024-05-01 PROCEDURE — 99214 OFFICE O/P EST MOD 30 MIN: CPT

## 2024-05-01 NOTE — ASSESSMENT
[FreeTextEntry1] : 58 y.o. Female follows for T1D. Dx 34y.old. Previously seen by Dr. Mendoza and Isabella Mendoza, NP. Off insulin pump.   Currently on: Tresiba 36U Qhs Novolog IC 1:10, ISF 1:50  SMBG: Fasting 120 - 220  # Uncontrolled T1D A1C 9.7<==9.0 Admits eating cookies over night due to fear of hypoglycemia.  Discussed dietary and lifestyle modifications.  Continue current regimen. Needs better carb counting and correction. Start SMBG 3 x day and bring logs next visit.  Would benefit from CGM but she declines  # Hypothyroidism TSH suppressed 0.04 FT4 slightly elevated 1.9 Decrease LT4  137 mcg to 6.5 tabs/week (1/2 tab on Sunday)  # HLD <==162 Stopped Rosuvastatin due to muscle pains Will resume previous Atorvastatin 20 mg daily and if tolerating will go to 40 mg daily Continue Vascepa and Zetia.   # HTN Continue Losartan and HCTZ  # Vit D deficiency Vit D 25 OH is 40 Does not require supplement.   F/u in 3 months with NP  (labs ordered) F/u with me in 6 months.

## 2024-05-01 NOTE — PHYSICAL EXAM
[Alert] : alert [No Acute Distress] : no acute distress [Well Developed] : well developed [Normal Voice/Communication] : normal voice communication [PERRL] : pupils equal, round and reactive to light [Normal Hearing] : hearing was normal [No Neck Mass] : no neck mass was observed [Thyroid Not Enlarged] : the thyroid was not enlarged [No Respiratory Distress] : no respiratory distress [Clear to Auscultation] : lungs were clear to auscultation bilaterally [Normal Rate] : heart rate was normal [Regular Rhythm] : with a regular rhythm [No Edema] : no peripheral edema [Normal Bowel Sounds] : normal bowel sounds [Soft] : abdomen soft [Normal Supraclavicular Nodes] : no supraclavicular lymphadenopathy [Normal Anterior Cervical Nodes] : no anterior cervical lymphadenopathy [Normal Posterior Cervical Nodes] : no posterior cervical lymphadenopathy [Normal Reflexes] : deep tendon reflexes were 2+ and symmetric [No Tremors] : no tremors [Oriented x3] : oriented to person, place, and time [Normal Affect] : the affect was normal [Normal Insight/Judgement] : insight and judgment were intact [Normal Mood] : the mood was normal

## 2024-05-14 RX ORDER — ATORVASTATIN CALCIUM 20 MG/1
20 TABLET, FILM COATED ORAL DAILY
Qty: 180 | Refills: 0 | Status: ACTIVE | COMMUNITY
Start: 2024-05-01 | End: 1900-01-01

## 2024-05-20 ENCOUNTER — RX RENEWAL (OUTPATIENT)
Age: 59
End: 2024-05-20

## 2024-05-20 RX ORDER — HYDROCHLOROTHIAZIDE 25 MG/1
25 TABLET ORAL
Qty: 90 | Refills: 1 | Status: ACTIVE | COMMUNITY
Start: 2017-07-20 | End: 1900-01-01

## 2024-05-20 RX ORDER — INSULIN DEGLUDEC INJECTION 100 U/ML
100 INJECTION, SOLUTION SUBCUTANEOUS
Qty: 2 | Refills: 1 | Status: ACTIVE | COMMUNITY
Start: 2019-04-09 | End: 1900-01-01

## 2024-07-31 ENCOUNTER — APPOINTMENT (OUTPATIENT)
Dept: ENDOCRINOLOGY | Facility: CLINIC | Age: 59
End: 2024-07-31

## 2024-08-02 ENCOUNTER — RX RENEWAL (OUTPATIENT)
Age: 59
End: 2024-08-02

## 2024-08-26 ENCOUNTER — RX RENEWAL (OUTPATIENT)
Age: 59
End: 2024-08-26

## 2024-10-01 ENCOUNTER — RX RENEWAL (OUTPATIENT)
Age: 59
End: 2024-10-01

## 2024-10-02 ENCOUNTER — RX RENEWAL (OUTPATIENT)
Age: 59
End: 2024-10-02

## 2024-10-21 ENCOUNTER — RX RENEWAL (OUTPATIENT)
Age: 59
End: 2024-10-21

## 2024-11-03 ENCOUNTER — EMERGENCY (EMERGENCY)
Facility: HOSPITAL | Age: 59
LOS: 1 days | Discharge: HOME | End: 2024-11-03
Attending: STUDENT IN AN ORGANIZED HEALTH CARE EDUCATION/TRAINING PROGRAM
Payer: COMMERCIAL

## 2024-11-03 VITALS
HEIGHT: 65 IN | DIASTOLIC BLOOD PRESSURE: 80 MMHG | RESPIRATION RATE: 16 BRPM | OXYGEN SATURATION: 98 % | TEMPERATURE: 99 F | WEIGHT: 177.47 LBS | SYSTOLIC BLOOD PRESSURE: 150 MMHG | HEART RATE: 117 BPM

## 2024-11-03 VITALS
HEART RATE: 75 BPM | DIASTOLIC BLOOD PRESSURE: 60 MMHG | RESPIRATION RATE: 17 BRPM | TEMPERATURE: 98 F | OXYGEN SATURATION: 92 % | SYSTOLIC BLOOD PRESSURE: 105 MMHG

## 2024-11-03 LAB
ALBUMIN SERPL ELPH-MCNC: 3.4 G/DL — SIGNIFICANT CHANGE UP (ref 3.3–5.2)
ALP SERPL-CCNC: 84 U/L — SIGNIFICANT CHANGE UP (ref 40–120)
ALT FLD-CCNC: 12 U/L — SIGNIFICANT CHANGE UP
ANION GAP SERPL CALC-SCNC: 16 MMOL/L — SIGNIFICANT CHANGE UP (ref 5–17)
APTT BLD: 28.3 SEC — SIGNIFICANT CHANGE UP (ref 24.5–35.6)
AST SERPL-CCNC: 16 U/L — SIGNIFICANT CHANGE UP
BASOPHILS # BLD AUTO: 0.05 K/UL — SIGNIFICANT CHANGE UP (ref 0–0.2)
BASOPHILS NFR BLD AUTO: 0.4 % — SIGNIFICANT CHANGE UP (ref 0–2)
BILIRUB SERPL-MCNC: 0.5 MG/DL — SIGNIFICANT CHANGE UP (ref 0.4–2)
BUN SERPL-MCNC: 11.4 MG/DL — SIGNIFICANT CHANGE UP (ref 8–20)
CALCIUM SERPL-MCNC: 8.8 MG/DL — SIGNIFICANT CHANGE UP (ref 8.4–10.5)
CHLORIDE SERPL-SCNC: 92 MMOL/L — LOW (ref 96–108)
CO2 SERPL-SCNC: 22 MMOL/L — SIGNIFICANT CHANGE UP (ref 22–29)
CREAT SERPL-MCNC: 0.66 MG/DL — SIGNIFICANT CHANGE UP (ref 0.5–1.3)
EGFR: 100 ML/MIN/1.73M2 — SIGNIFICANT CHANGE UP
EGFR: 100 ML/MIN/1.73M2 — SIGNIFICANT CHANGE UP
EOSINOPHIL # BLD AUTO: 0.03 K/UL — SIGNIFICANT CHANGE UP (ref 0–0.5)
EOSINOPHIL NFR BLD AUTO: 0.2 % — SIGNIFICANT CHANGE UP (ref 0–6)
GLUCOSE SERPL-MCNC: 221 MG/DL — HIGH (ref 70–99)
HCT VFR BLD CALC: 40.7 % — SIGNIFICANT CHANGE UP (ref 34.5–45)
HGB BLD-MCNC: 14.4 G/DL — SIGNIFICANT CHANGE UP (ref 11.5–15.5)
IMM GRANULOCYTES NFR BLD AUTO: 0.4 % — SIGNIFICANT CHANGE UP (ref 0–0.9)
INR BLD: 1.06 RATIO — SIGNIFICANT CHANGE UP (ref 0.85–1.16)
LACTATE BLDV-MCNC: 1.8 MMOL/L — SIGNIFICANT CHANGE UP (ref 0.5–2)
LYMPHOCYTES # BLD AUTO: 1.31 K/UL — SIGNIFICANT CHANGE UP (ref 1–3.3)
LYMPHOCYTES # BLD AUTO: 10.5 % — LOW (ref 13–44)
MCHC RBC-ENTMCNC: 30.7 PG — SIGNIFICANT CHANGE UP (ref 27–34)
MCHC RBC-ENTMCNC: 35.4 G/DL — SIGNIFICANT CHANGE UP (ref 32–36)
MCV RBC AUTO: 86.8 FL — SIGNIFICANT CHANGE UP (ref 80–100)
MONOCYTES # BLD AUTO: 1.24 K/UL — HIGH (ref 0–0.9)
MONOCYTES NFR BLD AUTO: 9.9 % — SIGNIFICANT CHANGE UP (ref 2–14)
NEUTROPHILS # BLD AUTO: 9.83 K/UL — HIGH (ref 1.8–7.4)
NEUTROPHILS NFR BLD AUTO: 78.6 % — HIGH (ref 43–77)
PLATELET # BLD AUTO: 296 K/UL — SIGNIFICANT CHANGE UP (ref 150–400)
POTASSIUM SERPL-MCNC: 3.7 MMOL/L — SIGNIFICANT CHANGE UP (ref 3.5–5.3)
POTASSIUM SERPL-SCNC: 3.7 MMOL/L — SIGNIFICANT CHANGE UP (ref 3.5–5.3)
PROT SERPL-MCNC: 6.4 G/DL — LOW (ref 6.6–8.7)
PROTHROM AB SERPL-ACNC: 12.3 SEC — SIGNIFICANT CHANGE UP (ref 9.9–13.4)
RBC # BLD: 4.69 M/UL — SIGNIFICANT CHANGE UP (ref 3.8–5.2)
RBC # FLD: 12 % — SIGNIFICANT CHANGE UP (ref 10.3–14.5)
SODIUM SERPL-SCNC: 130 MMOL/L — LOW (ref 135–145)
WBC # BLD: 12.51 K/UL — HIGH (ref 3.8–10.5)
WBC # FLD AUTO: 12.51 K/UL — HIGH (ref 3.8–10.5)

## 2024-11-03 PROCEDURE — 99284 EMERGENCY DEPT VISIT MOD MDM: CPT

## 2024-11-03 PROCEDURE — 93010 ELECTROCARDIOGRAM REPORT: CPT

## 2024-11-03 PROCEDURE — 71045 X-RAY EXAM CHEST 1 VIEW: CPT | Mod: 26

## 2024-11-03 PROCEDURE — 70450 CT HEAD/BRAIN W/O DYE: CPT | Mod: 26,MC

## 2024-11-03 PROCEDURE — 72125 CT NECK SPINE W/O DYE: CPT | Mod: 26,MC

## 2024-11-03 RX ORDER — METOCLOPRAMIDE HCL 10 MG
10 TABLET ORAL ONCE
Refills: 0 | Status: COMPLETED | OUTPATIENT
Start: 2024-11-03 | End: 2024-11-03

## 2024-11-03 RX ORDER — ACETAMINOPHEN 500 MG/5ML
1000 LIQUID (ML) ORAL ONCE
Refills: 0 | Status: COMPLETED | OUTPATIENT
Start: 2024-11-03 | End: 2024-11-03

## 2024-11-03 RX ORDER — BUTALBITAL, ACETAMINOPHEN AND CAFFEINE 50; 325; 40 MG/1; MG/1; MG/1
1 TABLET ORAL
Qty: 20 | Refills: 0
Start: 2024-11-03

## 2024-11-03 RX ORDER — SODIUM CHLORIDE 9 G/1000ML
2500 INJECTION, SOLUTION INTRAVENOUS ONCE
Refills: 0 | Status: COMPLETED | OUTPATIENT
Start: 2024-11-03 | End: 2024-11-03

## 2024-11-03 RX ORDER — KETOROLAC TROMETHAMINE 30 MG/ML
30 INJECTION, SOLUTION INTRAMUSCULAR; INTRAVENOUS ONCE
Refills: 0 | Status: DISCONTINUED | OUTPATIENT
Start: 2024-11-03 | End: 2024-11-03

## 2024-11-03 RX ADMIN — Medication 10 MILLIGRAM(S): at 12:54

## 2024-11-03 RX ADMIN — Medication 400 MILLIGRAM(S): at 12:54

## 2024-11-03 RX ADMIN — SODIUM CHLORIDE 2500 MILLILITER(S): 9 INJECTION, SOLUTION INTRAVENOUS at 12:55

## 2024-11-03 RX ADMIN — KETOROLAC TROMETHAMINE 30 MILLIGRAM(S): 30 INJECTION, SOLUTION INTRAMUSCULAR; INTRAVENOUS at 12:54

## 2024-11-04 ENCOUNTER — RESULT REVIEW (OUTPATIENT)
Age: 59
End: 2024-11-04

## 2024-11-04 ENCOUNTER — INPATIENT (INPATIENT)
Facility: HOSPITAL | Age: 59
LOS: 5 days | Discharge: HOME | DRG: 69 | End: 2024-11-10
Attending: HOSPITALIST | Admitting: PSYCHIATRY & NEUROLOGY
Payer: COMMERCIAL

## 2024-11-04 VITALS
DIASTOLIC BLOOD PRESSURE: 55 MMHG | OXYGEN SATURATION: 100 % | HEART RATE: 86 BPM | RESPIRATION RATE: 18 BRPM | SYSTOLIC BLOOD PRESSURE: 114 MMHG | HEIGHT: 65 IN | TEMPERATURE: 99 F

## 2024-11-04 LAB
ALBUMIN SERPL ELPH-MCNC: 3.3 G/DL — SIGNIFICANT CHANGE UP (ref 3.3–5.2)
ALP SERPL-CCNC: 80 U/L — SIGNIFICANT CHANGE UP (ref 40–120)
ALT FLD-CCNC: 15 U/L — SIGNIFICANT CHANGE UP
ANION GAP SERPL CALC-SCNC: 13 MMOL/L — SIGNIFICANT CHANGE UP (ref 5–17)
APTT BLD: 27.7 SEC — SIGNIFICANT CHANGE UP (ref 24.5–35.6)
AST SERPL-CCNC: 17 U/L — SIGNIFICANT CHANGE UP
BASOPHILS # BLD AUTO: 0.04 K/UL — SIGNIFICANT CHANGE UP (ref 0–0.2)
BASOPHILS # BLD AUTO: 0.04 K/UL — SIGNIFICANT CHANGE UP (ref 0–0.2)
BASOPHILS NFR BLD AUTO: 0.4 % — SIGNIFICANT CHANGE UP (ref 0–2)
BASOPHILS NFR BLD AUTO: 0.4 % — SIGNIFICANT CHANGE UP (ref 0–2)
BILIRUB SERPL-MCNC: 0.4 MG/DL — SIGNIFICANT CHANGE UP (ref 0.4–2)
BUN SERPL-MCNC: 9.7 MG/DL — SIGNIFICANT CHANGE UP (ref 8–20)
CALCIUM SERPL-MCNC: 8.6 MG/DL — SIGNIFICANT CHANGE UP (ref 8.4–10.5)
CHLORIDE SERPL-SCNC: 98 MMOL/L — SIGNIFICANT CHANGE UP (ref 96–108)
CO2 SERPL-SCNC: 23 MMOL/L — SIGNIFICANT CHANGE UP (ref 22–29)
CREAT SERPL-MCNC: 0.61 MG/DL — SIGNIFICANT CHANGE UP (ref 0.5–1.3)
EGFR: 102 ML/MIN/1.73M2 — SIGNIFICANT CHANGE UP
EOSINOPHIL # BLD AUTO: 0.08 K/UL — SIGNIFICANT CHANGE UP (ref 0–0.5)
EOSINOPHIL # BLD AUTO: 0.11 K/UL — SIGNIFICANT CHANGE UP (ref 0–0.5)
EOSINOPHIL NFR BLD AUTO: 0.7 % — SIGNIFICANT CHANGE UP (ref 0–6)
EOSINOPHIL NFR BLD AUTO: 1.2 % — SIGNIFICANT CHANGE UP (ref 0–6)
GLUCOSE BLDC GLUCOMTR-MCNC: 133 MG/DL — HIGH (ref 70–99)
GLUCOSE BLDC GLUCOMTR-MCNC: 204 MG/DL — HIGH (ref 70–99)
GLUCOSE SERPL-MCNC: 172 MG/DL — HIGH (ref 70–99)
HCT VFR BLD CALC: 35.6 % — SIGNIFICANT CHANGE UP (ref 34.5–45)
HCT VFR BLD CALC: 37.6 % — SIGNIFICANT CHANGE UP (ref 34.5–45)
HGB BLD-MCNC: 12.8 G/DL — SIGNIFICANT CHANGE UP (ref 11.5–15.5)
HGB BLD-MCNC: 13.4 G/DL — SIGNIFICANT CHANGE UP (ref 11.5–15.5)
IMM GRANULOCYTES NFR BLD AUTO: 0.3 % — SIGNIFICANT CHANGE UP (ref 0–0.9)
IMM GRANULOCYTES NFR BLD AUTO: 0.4 % — SIGNIFICANT CHANGE UP (ref 0–0.9)
INR BLD: 1.03 RATIO — SIGNIFICANT CHANGE UP (ref 0.85–1.16)
LACTATE BLDV-MCNC: 1 MMOL/L — SIGNIFICANT CHANGE UP (ref 0.5–2)
LYMPHOCYTES # BLD AUTO: 1.36 K/UL — SIGNIFICANT CHANGE UP (ref 1–3.3)
LYMPHOCYTES # BLD AUTO: 1.8 K/UL — SIGNIFICANT CHANGE UP (ref 1–3.3)
LYMPHOCYTES # BLD AUTO: 14.5 % — SIGNIFICANT CHANGE UP (ref 13–44)
LYMPHOCYTES # BLD AUTO: 16.1 % — SIGNIFICANT CHANGE UP (ref 13–44)
MCHC RBC-ENTMCNC: 31.1 PG — SIGNIFICANT CHANGE UP (ref 27–34)
MCHC RBC-ENTMCNC: 31.4 PG — SIGNIFICANT CHANGE UP (ref 27–34)
MCHC RBC-ENTMCNC: 35.6 G/DL — SIGNIFICANT CHANGE UP (ref 32–36)
MCHC RBC-ENTMCNC: 36 G/DL — SIGNIFICANT CHANGE UP (ref 32–36)
MCV RBC AUTO: 87.2 FL — SIGNIFICANT CHANGE UP (ref 80–100)
MCV RBC AUTO: 87.3 FL — SIGNIFICANT CHANGE UP (ref 80–100)
MONOCYTES # BLD AUTO: 0.83 K/UL — SIGNIFICANT CHANGE UP (ref 0–0.9)
MONOCYTES # BLD AUTO: 1.06 K/UL — HIGH (ref 0–0.9)
MONOCYTES NFR BLD AUTO: 8.9 % — SIGNIFICANT CHANGE UP (ref 2–14)
MONOCYTES NFR BLD AUTO: 9.5 % — SIGNIFICANT CHANGE UP (ref 2–14)
NEUTROPHILS # BLD AUTO: 6.98 K/UL — SIGNIFICANT CHANGE UP (ref 1.8–7.4)
NEUTROPHILS # BLD AUTO: 8.13 K/UL — HIGH (ref 1.8–7.4)
NEUTROPHILS NFR BLD AUTO: 72.9 % — SIGNIFICANT CHANGE UP (ref 43–77)
NEUTROPHILS NFR BLD AUTO: 74.7 % — SIGNIFICANT CHANGE UP (ref 43–77)
PLATELET # BLD AUTO: 287 K/UL — SIGNIFICANT CHANGE UP (ref 150–400)
PLATELET # BLD AUTO: 310 K/UL — SIGNIFICANT CHANGE UP (ref 150–400)
POTASSIUM SERPL-MCNC: 3.5 MMOL/L — SIGNIFICANT CHANGE UP (ref 3.5–5.3)
POTASSIUM SERPL-SCNC: 3.5 MMOL/L — SIGNIFICANT CHANGE UP (ref 3.5–5.3)
PROT SERPL-MCNC: 6.3 G/DL — LOW (ref 6.6–8.7)
PROTHROM AB SERPL-ACNC: 11.6 SEC — SIGNIFICANT CHANGE UP (ref 9.9–13.4)
RBC # BLD: 4.08 M/UL — SIGNIFICANT CHANGE UP (ref 3.8–5.2)
RBC # BLD: 4.31 M/UL — SIGNIFICANT CHANGE UP (ref 3.8–5.2)
RBC # FLD: 11.9 % — SIGNIFICANT CHANGE UP (ref 10.3–14.5)
RBC # FLD: 12 % — SIGNIFICANT CHANGE UP (ref 10.3–14.5)
SODIUM SERPL-SCNC: 134 MMOL/L — LOW (ref 135–145)
TROPONIN T, HIGH SENSITIVITY RESULT: 10 NG/L — SIGNIFICANT CHANGE UP (ref 0–51)
WBC # BLD: 11.16 K/UL — HIGH (ref 3.8–10.5)
WBC # BLD: 9.35 K/UL — SIGNIFICANT CHANGE UP (ref 3.8–10.5)
WBC # FLD AUTO: 11.16 K/UL — HIGH (ref 3.8–10.5)
WBC # FLD AUTO: 9.35 K/UL — SIGNIFICANT CHANGE UP (ref 3.8–10.5)

## 2024-11-04 PROCEDURE — 70450 CT HEAD/BRAIN W/O DYE: CPT | Mod: 26,MC,59

## 2024-11-04 PROCEDURE — 70498 CT ANGIOGRAPHY NECK: CPT | Mod: 26,MC

## 2024-11-04 PROCEDURE — 93010 ELECTROCARDIOGRAM REPORT: CPT

## 2024-11-04 PROCEDURE — 93970 EXTREMITY STUDY: CPT | Mod: 26

## 2024-11-04 PROCEDURE — 70551 MRI BRAIN STEM W/O DYE: CPT | Mod: 26

## 2024-11-04 PROCEDURE — 71045 X-RAY EXAM CHEST 1 VIEW: CPT | Mod: 26,77

## 2024-11-04 PROCEDURE — 99223 1ST HOSP IP/OBS HIGH 75: CPT

## 2024-11-04 PROCEDURE — 99285 EMERGENCY DEPT VISIT HI MDM: CPT

## 2024-11-04 PROCEDURE — 71045 X-RAY EXAM CHEST 1 VIEW: CPT | Mod: 26

## 2024-11-04 PROCEDURE — 0042T: CPT | Mod: MC

## 2024-11-04 PROCEDURE — 70496 CT ANGIOGRAPHY HEAD: CPT | Mod: 26,MC

## 2024-11-04 RX ORDER — INSULIN LISPRO 100/ML
6 VIAL (ML) SUBCUTANEOUS
Refills: 0 | Status: DISCONTINUED | OUTPATIENT
Start: 2024-11-04 | End: 2024-11-05

## 2024-11-04 RX ORDER — INSULIN DEGLUDEC 100 U/ML
36 INJECTION, SOLUTION SUBCUTANEOUS
Refills: 0 | DISCHARGE

## 2024-11-04 RX ORDER — LEVOTHYROXINE SODIUM 88 MCG
137 TABLET ORAL DAILY
Refills: 0 | Status: DISCONTINUED | OUTPATIENT
Start: 2024-11-04 | End: 2024-11-10

## 2024-11-04 RX ORDER — INSULIN LISPRO 100/ML
6 VIAL (ML) SUBCUTANEOUS
Refills: 0 | DISCHARGE

## 2024-11-04 RX ORDER — ICOSAPENT ETHYL 1 G/1
2 CAPSULE, LIQUID FILLED ORAL
Refills: 0 | DISCHARGE

## 2024-11-04 RX ORDER — NICOTINE POLACRILEX 4 MG/1
1 GUM, CHEWING ORAL DAILY
Refills: 0 | Status: DISCONTINUED | OUTPATIENT
Start: 2024-11-04 | End: 2024-11-10

## 2024-11-04 RX ORDER — INSULIN LISPRO 100/ML
VIAL (ML) SUBCUTANEOUS
Refills: 0 | Status: DISCONTINUED | OUTPATIENT
Start: 2024-11-04 | End: 2024-11-10

## 2024-11-04 RX ORDER — CLOPIDOGREL 75 MG/1
75 TABLET ORAL DAILY
Refills: 0 | Status: DISCONTINUED | OUTPATIENT
Start: 2024-11-04 | End: 2024-11-04

## 2024-11-04 RX ORDER — ACETAMINOPHEN 500 MG
1000 TABLET ORAL ONCE
Refills: 0 | Status: COMPLETED | OUTPATIENT
Start: 2024-11-04 | End: 2024-11-04

## 2024-11-04 RX ORDER — CLOPIDOGREL 75 MG/1
300 TABLET ORAL ONCE
Refills: 0 | Status: COMPLETED | OUTPATIENT
Start: 2024-11-04 | End: 2024-11-04

## 2024-11-04 RX ORDER — INFLUENZ VIR VAC TV P-SURF2003 15MCG/.5ML
0.5 SYRINGE (ML) INTRAMUSCULAR ONCE
Refills: 0 | Status: DISCONTINUED | OUTPATIENT
Start: 2024-11-04 | End: 2024-11-10

## 2024-11-04 RX ORDER — ASPIRIN/MAG CARB/ALUMINUM AMIN 325 MG
81 TABLET ORAL DAILY
Refills: 0 | Status: DISCONTINUED | OUTPATIENT
Start: 2024-11-04 | End: 2024-11-10

## 2024-11-04 RX ORDER — ENOXAPARIN SODIUM 40MG/0.4ML
40 SYRINGE (ML) SUBCUTANEOUS EVERY 24 HOURS
Refills: 0 | Status: DISCONTINUED | OUTPATIENT
Start: 2024-11-04 | End: 2024-11-10

## 2024-11-04 RX ORDER — INSULIN GLARGINE,HUM.REC.ANLOG 100/ML
36 VIAL (ML) SUBCUTANEOUS AT BEDTIME
Refills: 0 | Status: DISCONTINUED | OUTPATIENT
Start: 2024-11-04 | End: 2024-11-06

## 2024-11-04 RX ORDER — EZETIMIBE 10 MG/1
1 TABLET ORAL
Refills: 0 | DISCHARGE

## 2024-11-04 RX ORDER — CLOPIDOGREL 75 MG/1
75 TABLET ORAL DAILY
Refills: 0 | Status: DISCONTINUED | OUTPATIENT
Start: 2024-11-05 | End: 2024-11-10

## 2024-11-04 RX ORDER — GLUCAGON INJECTION, SOLUTION 1 MG/.2ML
1 INJECTION, SOLUTION SUBCUTANEOUS ONCE
Refills: 0 | Status: DISCONTINUED | OUTPATIENT
Start: 2024-11-04 | End: 2024-11-10

## 2024-11-04 RX ADMIN — Medication 81 MILLIGRAM(S): at 14:18

## 2024-11-04 RX ADMIN — CLOPIDOGREL 300 MILLIGRAM(S): 75 TABLET ORAL at 14:18

## 2024-11-04 RX ADMIN — Medication 1000 MILLIGRAM(S): at 23:54

## 2024-11-04 RX ADMIN — Medication 36 UNIT(S): at 22:26

## 2024-11-04 RX ADMIN — Medication 400 MILLIGRAM(S): at 22:54

## 2024-11-04 RX ADMIN — Medication 80 MILLIGRAM(S): at 22:26

## 2024-11-04 NOTE — H&P ADULT - HISTORY OF PRESENT ILLNESS
59 year old female, PMHx HTN, HLD, T1DM, hypothyroidism, current tobacco use (~1ppd for 20 years) presented to [facility] ED 11/4/24 for complaint of slurred speech and right sided weakness. Last known well was 2PM that morning when patient went to sleep; she awoke at 6AM complaining of right upper and lower extremity weakness, as well as word-finding difficulty. Symptoms resolved after about 20 minutes, however recurred again at 8AM and then gradually resolved. Brought in by [facility] mobile stroke unit, imaging obtained prior to arrival reportedly negative for any acute abnormalities. Asymptomatic upon ED arrival. CT head, CTA head/neck repeated here, no evidence of hemorrhage or acute infarct, no large vessel occlusion or significant stenosis. Patient currently complaining of headache and neck pain (notes this issue has been ongoing for ~2months, recent hx of epidural steroid injections). Denies any visual changes, chest pain, shortness of breath, abdominal pain. Admitted to stroke service for further work up.  59 year old female, PMHx HTN, HLD, T1DM, hypothyroidism, current tobacco use (~1ppd for 20 years) presented to [facility] ED 11/4/24 for complaint of slurred speech and right sided weakness. Last known well was 2AM that morning when patient went to sleep; she awoke at 6AM complaining of right upper and lower extremity weakness, as well as word-finding difficulty. Symptoms resolved after about 20 minutes, however recurred again at 8AM and then gradually resolved. Brought in by [facility] mobile stroke unit, imaging obtained prior to arrival reportedly negative for any acute abnormalities. Asymptomatic upon ED arrival. CT head, CTA head/neck repeated here, no evidence of hemorrhage or acute infarct, no large vessel occlusion or significant stenosis. Patient currently complaining of headache and neck pain (notes this issue has been ongoing for ~2months, recent hx of epidural steroid injections). Denies any visual changes, chest pain, shortness of breath, abdominal pain. Admitted to stroke service for further work up.

## 2024-11-04 NOTE — PATIENT PROFILE ADULT - NSPROSPHOSPCHAPLAINYN_GEN_A_NUR
Gen: alert, NAD  HEENT:  NC/AT, PERRLA  CV:  well perfused, rrr, +murmur  Pulm:  normal RR, breathing comfortably  Abd: s/nt/nd  MSK: moving all extremities  Neuro:  non-focal  Skin:  visualized areas intact  Psych: AOx3
no

## 2024-11-04 NOTE — ED PROVIDER NOTE - OBJECTIVE STATEMENT
59-year-old female with history of hypertension hyperlipidemia still smoking presents to ED via [facility] mobile stroke unit after waking up from sleep approximately 6 AM today with intermittent slurred speech and right-sided weakness.   NIHSS as per EMS was 7, before spontaneously resolving.  Last known well was at 2 AM prior to going to sleep.  as per EMS patient had symptoms prior to her CT/CTA head and neck but resolved prior to having scan.  On arrival patient awake and alert with NIHSS 0

## 2024-11-04 NOTE — H&P ADULT - ASSESSMENT
ASSESSMENT:   59 year old female, PMHx HTN, HLD, T1DM, hypothyroidism, current tobacco use (~1ppd for 20 years) presented to [facility] ED 11/4/24 for complaint of slurred speech and right sided weakness. Last known well was 2PM that morning when patient went to sleep; she awoke at 6AM complaining of right upper and lower extremity weakness, as well as word-finding difficulty. Symptoms resolved after about 20 minutes, however recurred again at 8AM and then gradually resolved. Brought in by [place] mobile stroke unit, imaging obtained prior to arrival reportedly negative for any acute abnormalities. Asymptomatic upon ED arrival. CT head, CTA head/neck repeated here, no evidence of hemorrhage or acute infarct, no large vessel occlusion or significant stenosis.     Suspect stuttering TIA as etiology of symptoms, pending MRI for further evaluation.     NEURO:   -Neurologically w/ slight R facial asymmetry, otherwise no focal neurologic deficits   -Continue close monitoring for neurologic deterioration    - Stroke neuro checks q2hrs    -SBP goal 120-180mmHg, avoiding rapid fluctuations or hypotension    -ANTITHROMBOTIC THERAPY: s/p Plavix load 300mg, c/w 81mg ASA and plavix 75mg daily   -titrate statin to LDL goal less than 70; start high intensity statin, obtain lipid panel, titrate statin dosing based on results   -MRI Brain w/o pending   -Dysphagia screen: pass  -Physical therapy/OT/Speech eval/treatment.       CARDIOVASCULAR:   -check TTE  -Obtain baseline ECG   -cardiac monitoring w/ telemetry for now, further evaluation pending findings of noted workup                             HEMATOLOGY:  - H/H 13.4/37.6, Platelets 287  -patient should have all age and risk appropriate malignancy screenings with PCP or sooner if clinically suspected   -Obtain LE duplex to r/o DVT as potential embolic source of stroke      DVT ppx: LMWH     PULMONARY:   -Baseline CXR ordered  - protecting airway, saturating well     RENAL:   -BUN/Cr 9.7/0.61, monitor urine output, maintain adequate hydration    -Mild hyponatremia, IV fluid bolus given; trend lytes, replenish prn   -Na Goal:  135-145    ID:   -afebrile, no leukocytosis  -monitor for si/sx of infection     ENDOCRINE:  #T1DM, Hypothyroidism  -A1C=8.9, insulin sliding scale and lantus ordered; endocrine consult pending  -Thyroid panel pending, c/w home dose of levothyroxine, titrate as needed     OTHER:    -condition and plan of care d/w patient, questions and concerns addressed.     DISPOSITION: Rehab or home depending on PT eval once stable and workup is complete      CORE MEASURES:        Admission NIHSS: 1     Tenecteplase : [] YES [x] NO      LDL/HDL/A1C: Lipid panel pending/8.9     Depression Screen- if depression hx and/or present      Statin Therapy: Atorvastatin 80mg daily      Dysphagia Screen: [x] PASS [] FAIL     Smoking [x] YES [] NO      Afib [] YES [x] NO     Stroke Education [] YES [] NO [x] pending  ASSESSMENT:   59 year old female, PMHx HTN, HLD, T1DM, hypothyroidism, current tobacco use (~1ppd for 20 years) presented to [facility] ED 11/4/24 for complaint of slurred speech and right sided weakness. Last known well was 2AM that morning when patient went to sleep; she awoke at 6AM complaining of right upper and lower extremity weakness, as well as word-finding difficulty. Symptoms resolved after about 20 minutes, however recurred again at 8AM and then gradually resolved. Brought in by [place] mobile stroke unit, imaging obtained prior to arrival reportedly negative for any acute abnormalities. Asymptomatic upon ED arrival. CT head, CTA head/neck repeated here, no evidence of hemorrhage or acute infarct, no large vessel occlusion or significant stenosis.     Suspect stuttering TIA as etiology of symptoms, pending MRI for further evaluation.     NEURO:   -Neurologically w/ slight R facial asymmetry, otherwise no focal neurologic deficits   -Continue close monitoring for neurologic deterioration    - Stroke neuro checks q2hrs    -SBP goal 120-180mmHg, avoiding rapid fluctuations or hypotension    -ANTITHROMBOTIC THERAPY: s/p Plavix load 300mg, c/w 81mg ASA and plavix 75mg daily   -titrate statin to LDL goal less than 70; start high intensity statin, obtain lipid panel, titrate statin dosing based on results   -MRI Brain w/o pending   -Dysphagia screen: pass  -Physical therapy/OT/Speech eval/treatment.       CARDIOVASCULAR:   -check TTE  -Obtain baseline ECG   -cardiac monitoring w/ telemetry for now, further evaluation pending findings of noted workup                             HEMATOLOGY:  - H/H 13.4/37.6, Platelets 287  -patient should have all age and risk appropriate malignancy screenings with PCP or sooner if clinically suspected   -Obtain LE duplex to r/o DVT as potential embolic source of stroke      DVT ppx: LMWH     PULMONARY:   -Baseline CXR ordered  - protecting airway, saturating well     RENAL:   -BUN/Cr 9.7/0.61, monitor urine output, maintain adequate hydration    -Mild hyponatremia, IV fluid bolus given; trend lytes, replenish prn   -Na Goal:  135-145    ID:   -afebrile, no leukocytosis  -monitor for si/sx of infection     ENDOCRINE:  #T1DM, Hypothyroidism  -A1C=8.9, insulin sliding scale and lantus ordered; endocrine consult pending  -Thyroid panel pending, c/w home dose of levothyroxine, titrate as needed     OTHER:    -condition and plan of care d/w patient, questions and concerns addressed.     DISPOSITION: Rehab or home depending on PT eval once stable and workup is complete      CORE MEASURES:        Admission NIHSS: 1     Tenecteplase : [] YES [x] NO      LDL/HDL/A1C: Lipid panel pending/8.9     Depression Screen- if depression hx and/or present      Statin Therapy: Atorvastatin 80mg daily      Dysphagia Screen: [x] PASS [] FAIL     Smoking [x] YES [] NO      Afib [] YES [x] NO     Stroke Education [] YES [] NO [x] pending  ASSESSMENT:   59 year old female, PMHx HTN, HLD, T1DM, hypothyroidism, current tobacco use (~1ppd for 20 years) presented to [facility] ED 11/4/24 for complaint of slurred speech and right sided weakness. Last known well was 2AM that morning when patient went to sleep; she awoke at 6AM complaining of right upper and lower extremity weakness, as well as word-finding difficulty. Symptoms resolved after about 20 minutes, however recurred again at 8AM and then gradually resolved. Brought in by [facility] mobile stroke unit, imaging obtained prior to arrival reportedly negative for any acute abnormalities. Asymptomatic upon ED arrival. CT head, CTA head/neck repeated here, no evidence of hemorrhage or acute infarct, no large vessel occlusion or significant stenosis.     Suspect stuttering TIA as etiology of symptoms, pending MRI for further evaluation.     NEURO: Suspected stuttering lacunar stroke  -Neurologically w/ slight R facial asymmetry, otherwise no focal neurologic deficits   -Continue close monitoring for neurologic deterioration    - Stroke neuro checks q2hrs    -SBP goal 120-180mmHg, avoiding rapid fluctuations or hypotension    -ANTITHROMBOTIC THERAPY: s/p Plavix load 300mg, c/w 81mg ASA and plavix 75mg daily   -titrate statin to LDL goal less than 70; start high intensity statin, obtain lipid panel, titrate statin dosing based on results   -MRI Brain w/o pending   -Dysphagia screen: pass  -Physical therapy/OT/Speech eval/treatment.       CARDIOVASCULAR:   -check TTE  -Obtain baseline ECG   -cardiac monitoring w/ telemetry for now, further evaluation pending findings of noted workup                             HEMATOLOGY:  - H/H 13.4/37.6, Platelets 287  -patient should have all age and risk appropriate malignancy screenings with PCP or sooner if clinically suspected   -Obtain LE duplex to r/o DVT as potential embolic source of stroke      DVT ppx: LMWH     PULMONARY:   -Baseline CXR ordered  - protecting airway, saturating well     RENAL:   -BUN/Cr 9.7/0.61, monitor urine output, maintain adequate hydration    -Mild hyponatremia, IV fluid bolus given; trend lytes, replenish prn   -Na Goal:  135-145    ID:   -afebrile, no leukocytosis  -monitor for si/sx of infection     ENDOCRINE:  #T1DM, Hypothyroidism  -A1C=8.9, insulin sliding scale and lantus ordered; endocrine consult pending  -Thyroid panel pending, c/w home dose of levothyroxine, titrate as needed     Psych: Tobacco abuse  - Counseled on smoking cessation     OTHER:    -condition and plan of care d/w patient, questions and concerns addressed.     DISPOSITION: Rehab or home depending on PT eval once stable and workup is complete      CORE MEASURES:        Admission NIHSS: 1     Tenecteplase : [] YES [x] NO      LDL/HDL/A1C: Lipid panel pending/8.9     Depression Screen- if depression hx and/or present      Statin Therapy: Atorvastatin 80mg daily      Dysphagia Screen: [x] PASS [] FAIL     Smoking [x] YES [] NO      Afib [] YES [x] NO     Stroke Education [] YES [] NO [x] pending

## 2024-11-04 NOTE — H&P ADULT - NSHPPHYSICALEXAM_GEN_ALL_CORE
Physical Exam:  General: No acute distress.   HEENT: Head normocephalic, atraumatic. Conjunctivae clear w/o exudates or hemorrhage. Sclera non-icteric. Nares are patent bilaterally.   Neck: Supple, no adenopathy. Trachea midline. No JVD.  Cardiac: Normal rate and rhythm. S1, S2 auscultated. No murmurs, gallops, or rubs.     Respiratory: Lung sounds clear in all fields. Chest wall symmetric, nontender.   Abdominal: Soft, nondistended, nontender. Bowel sounds normoactive x 4 quadrants.   Skin: Skin is warm, dry and intact without rashes or lesions.   Extremities: No edema, no calf tenderness      Detailed Neurologic Exam:    Mental status: The patient is awake and alert and has normal attention span.  The patient is fully oriented in 3 spheres. The patient is oriented to current events. The patient is able to name objects, follow commands, repeat sentences.    Cranial nerves: Pupils equal and react symmetrically to light. There is no visual field deficit to confrontation. Extraocular motion is full with no nystagmus. There is no ptosis. Facial sensation is intact. Slight R facial asymmetry noted. Palate elevates symmetrically. Tongue is midline.    Motor: There is normal bulk and tone.  There is no tremor.  Strength is 5/5 in the right arm and leg.   Strength is 5/5 in the left arm and leg.    Sensation: Intact to light touch in 4 extremities    Cerebellar: There is no dysmetria on finger to nose testing.

## 2024-11-04 NOTE — ED PROVIDER NOTE - NSICDXPASTMEDICALHX_GEN_ALL_CORE_FT
PAST MEDICAL HISTORY:  Diabetes     High cholesterol     HTN (hypertension)     Hypothyroidism, adult

## 2024-11-04 NOTE — ED PROVIDER NOTE - CLINICAL SUMMARY MEDICAL DECISION MAKING FREE TEXT BOX
59-year-old female with history of hypertension hyperlipidemia still smoking presents to ED via [place] mobile stroke unit after waking up from sleep approximately 6 AM today with intermittent slurred speech and right-sided weakness.   NIHSS as per EMS was 7, before spontaneously resolving.  Last known well was at 2 AM prior to going to sleep.  as per EMS patient had symptoms prior to her CT/CTA head and neck but resolved prior to having scan.  On arrival patient awake and alert with NIHSS 0.   Case discussed with stroke neurologist Dr. Avilez and recommending repeating CT/CTA with perfusion

## 2024-11-04 NOTE — STROKE CODE NOTE - NIH STROKE SCALE DATE
Health Maintenance Summary     Topic Due On Due Status Completed On    MAMMOGRAM - BREAST CANCER SCREENING Mar 14, 2018 Not Due Mar 14, 2016    Colorectal Cancer Screening - Colonoscopy Apr 20, 2027 Not Due Apr 20, 2017    PAP SMEAR - CERVICAL CANCER SCREENING Jul 31, 2018 Not Due Jul 31, 2015    Immunization - TDAP Pregnancy  Hidden     IMMUNIZATION - DTaP/Tdap/Td Jan 18, 2018 Not Due Jan 18, 2008    Immunization-Influenza  Completed Sep 27, 2016          Patient is up to date, no discussion zwjsptydwiggquwbrydjsjxmuidasluheslrgcnyditiicbqzfejwteyownrskumobqsphkyqmmdegefqdl[id number] .     04-Nov-2024

## 2024-11-04 NOTE — CONSULT NOTE ADULT - ASSESSMENT
59 year old female, PMHx HTN, HLD, T1DM, hypothyroidism, current tobacco use (~1ppd for 20 years) presented to [facility] ED 11/4/24 for complaint of slurred speech and right sided weakness.    Admitted to stroke service for further work up.    Has h/o DM type 1, since 24 years, a1c 8.9% on admission, at home on tresiba 36 units daily and admelog 6 to 8 units tid with meals, took last dose of tresiba , last night.    DM type 1:  -To start home dose of  lantus 36 units daily, first dose tonight, admelog ssi, once starts eating start admelog 8 units tid with meals.  -check fingerstick ac tid hs.  -diabetic diet.     hypothyroidism:  -check TFTs.  -continue home dose of  levothyroxine 137 mcg daily     Stroke:   -  neuro checks q2hrs    -monitor BP  - c/w ASA and plavix  daily   - start statins  -MRI Brain w/o pending   -Dysphagia screen  -Physical therapy/OT/Speech eval/treatment.

## 2024-11-04 NOTE — ED ADULT TRIAGE NOTE - CHIEF COMPLAINT QUOTE
all other ROS negative except as per HPI BIBEMS for intermittent right sided facial droop, right sided leg and arm weakness and dysarthria. Reports that she went to bed at  02:00AM normal, woke up at 06:00AM with the symptoms. Code Stroke activated MD Javier OLIVIA at bedside.

## 2024-11-04 NOTE — ED ADULT NURSE NOTE - NSICDXPASTMEDICALHX_GEN_ALL_CORE_FT
"History  Chief Complaint   Patient presents with    Eye Injury     Pt reports piece of plastic went into his R eye. No vision changes     59-year-old male with no pertinent past medical history, presents emergency department due to concerns he has plastic stuck in his eye.  This morning he was working with a plastic clip for a car that ended up striking him in the eye.  He took a shower around 10 AM and attempted to flush the eye out.  Throughout the day he had continuing pain and sensation that there is something still stuck in the eye so he presented to emergency department.  He denies any vision changes, photophobia, or other complaints.  He does not wear contact lenses.    Prior to Admission Medications   Prescriptions Last Dose Informant Patient Reported? Taking?   Blood Glucose Monitoring Suppl (ONE TOUCH ULTRA 2) w/Device KIT  Self Yes No   Sig: Use as directed   Cyanocobalamin (B-12-SL) 1000 MCG SUBL   No No   Sig: Place 1 tablet (1,000 mcg total) under the tongue in the morning   Lancets (OneTouch Delica Plus [id number]) MISC  Self Yes No   Sig: USE TO CHECK BLOOD GLUCOSE EVERY DAY   OneTouch Ultra test strip  Self Yes No   Sig: USE TO CHECK BLOOD GLUCOSE EVERY DAY   SYRINGE/NEEDLE, DISP, 1 ML 23G X 1\" 1 ML MISC  Self No No   Sig: Use to administer B12 IM every 2 weeks   albuterol (2.5 mg/3 mL) 0.083 % nebulizer solution  Self Yes No   Sig: Take 2.5 mg by nebulization every 6 (six) hours as needed for wheezing or shortness of breath   Patient not taking: Reported on 8/30/2023   albuterol (PROVENTIL HFA,VENTOLIN HFA) 90 mcg/act inhaler  Self Yes No   Sig: Inhale 2 puffs every 6 (six) hours as needed for wheezing   Patient not taking: Reported on 8/30/2023   ascorbic acid (VITAMIN C) 500 MG tablet  Self Yes No   Sig: TAKE 1 TABLET TWICE DAILY   bisacodyl (DULCOLAX) 5 mg EC tablet   No No   Sig: Take 4 tablets (20 mg total) by mouth once for 1 dose   budesonide-formoterol (SYMBICORT) 160-4.5 mcg/act inhaler  " Self Yes No   Sig: Symbicort 160 mcg-4.5 mcg/actuation HFA aerosol inhaler   TAKE 2 PUFFS BY MOUTH TWICE A DAY   Patient not taking: Reported on 2023   calcium carbonate-vitamin D (OSCAL-D) 500 mg-200 units per tablet  Self Yes No   Sig: Take 1 tablet by mouth 2 (two) times a day with meals   erythromycin (ILOTYCIN) ophthalmic ointment  Self Yes No   Sig: APPLY IN LEFT EYE AT BEDTIME   fexofenadine (ALLEGRA ODT) 30 MG disintegrating tablet  Self No No   Sig: Take 1 tablet (30 mg total) by mouth daily   fluticasone (FLONASE) 50 mcg/act nasal spray  Self No No   Si sprays into each nostril daily   glucose blood test strip   Yes No   Sig: OneTouch Ultra Test strips   ketotifen (ZADITOR) 0.025 % ophthalmic solution  Self Yes No   olopatadine (PATANOL) 0.1 % ophthalmic solution  Self Yes No   polyethylene glycol (GOLYTELY) 4000 mL solution   No No   Sig: Take 4,000 mL by mouth once for 1 dose   polyvinyl alcohol (LIQUIFILM TEARS) 1.4 % ophthalmic solution  Self Yes No   Sig: APPLY 1 DROP AS NEEDED BY OPHTHALMIC ROUTE.      Facility-Administered Medications: None       Past Medical History:   Diagnosis Date    Anxiety     Arthritis     Depression     Diabetes mellitus (HCC)     NIDDM    Hyperlipidemia     Lumbar disc disorder     left leg and foot  numbness    Meniscus tear     Other specified joint disorders, right knee     Seasonal allergies     Trigger finger, left index finger     and right       Past Surgical History:   Procedure Laterality Date    ARTHROSCOPY KNEE Left 2016    Procedure: ARTHROSCOPY KNEE;  Surgeon: Ester Conn MD;  Location: [facility];  Service:     ARTHROSCOPY KNEE Right 2017    Procedure: KNEE ARTHROSCOPY; DECOMPRESSION OF ACL CYST, PARTIAL MEDIAL MENISECTOMY;  Surgeon: Dante Sosa MD;  Location: [facility];  Service: Orthopedics    COLONOSCOPY  2016    FRACTURE SURGERY Right     leg    HERNIA REPAIR      umbilical     SC ARTHRS KNE SURG W/MENISCECTOMY MED/LAT W/SHVG  Left 2/1/2016    Procedure: PARTIAL MENISCECTOMY MEDIAL, SYNOCECTOMY, CHONDROPLASTY;  Surgeon: Ester Conn MD;  Location: [facility];  Service: Orthopedics    PA COLONOSCOPY FLX DX W/COLLJ SPEC WHEN PFRMD N/A 8/18/2016    Procedure: COLONOSCOPY;  Surgeon: Shaggy Perez MD;  Location: [facility];  Service: Gastroenterology    PA TENDON SHEATH INCISION Left 2/6/2018    Procedure: INDEX FINGER TRIGGER RELEASE; LEFT LONG, RING, and SMALL FINGER INJECTIONS;  Surgeon: Pelon Guadarrama MD;  Location: [facility];  Service: Orthopedics       Family History   Problem Relation Age of Onset    Diabetes Mother     Kidney disease Mother     Diabetes Father      I have reviewed and agree with the history as documented.    E-Cigarette/Vaping    E-Cigarette Use Never User      E-Cigarette/Vaping Substances    Nicotine No     THC No     CBD No     Flavoring No     Other No     Unknown No      Social History     Tobacco Use    Smoking status: Never    Smokeless tobacco: Never   Vaping Use    Vaping status: Never Used   Substance Use Topics    Alcohol use: Yes     Comment: few x year    Drug use: No        Review of Systems   All other systems reviewed and are negative.      Physical Exam  ED Triage Vitals [05/29/24 2228]   Temperature Pulse Respirations Blood Pressure SpO2   97.6 °F (36.4 °C) 74 18 134/74 96 %      Temp Source Heart Rate Source Patient Position - Orthostatic VS BP Location FiO2 (%)   Temporal Monitor Sitting Right arm --      Pain Score       --             Orthostatic Vital Signs  Vitals:    05/29/24 2228   BP: 134/74   Pulse: 74   Patient Position - Orthostatic VS: Sitting       Physical Exam  Vitals and nursing note reviewed.   Constitutional:       General: He is not in acute distress.     Appearance: He is well-developed.   HENT:      Head: Normocephalic and atraumatic.   Eyes:      Conjunctiva/sclera: Conjunctivae normal.      Comments: No visible foreign body. On fluorescein staining, visible linear  abrasion at 12 oclock position of pupil. VA 20/30 OD, 20/30 OS   Cardiovascular:      Rate and Rhythm: Normal rate and regular rhythm.      Heart sounds: No murmur heard.  Pulmonary:      Effort: Pulmonary effort is normal. No respiratory distress.   Abdominal:      General: Abdomen is flat.   Musculoskeletal:         General: No swelling.      Cervical back: Neck supple.   Skin:     General: Skin is warm and dry.      Capillary Refill: Capillary refill takes less than 2 seconds.   Neurological:      Mental Status: He is alert.   Psychiatric:         Mood and Affect: Mood normal.         ED Medications  Medications   ofloxacin (OCUFLOX) 0.3 % ophthalmic solution 1 drop (has no administration in time range)   tetracaine 0.5 % ophthalmic solution 1 drop (1 drop Both Eyes Given 5/29/24 2249)   fluorescein sodium sterile ophthalmic strip 1 strip (1 strip Both Eyes Given 5/29/24 2249)       Diagnostic Studies  Results Reviewed       None                   No orders to display         Procedures  Procedures      ED Course                                       Medical Decision Making  59 M presenting with foreign body sensation. No visible foreign body on slit lamp exam. There is a linear corneal abrasion with staining. Will treat with course of antibiotic eye drops, recommend f/u w/ PCP for reeavluation in 5 days.     Risk  Prescription drug management.          Disposition  Final diagnoses:   Corneal abrasion     Time reflects when diagnosis was documented in both MDM as applicable and the Disposition within this note       Time User Action Codes Description Comment    5/29/2024 11:07 PM Janes Cavazos Add [S05.00XA] Corneal abrasion           ED Disposition       ED Disposition   Discharge    Condition   Stable    Date/Time   Wed May 29, 2024 11:07 PM    Comment   Saw Bernal discharge to home/self care.                   Follow-up Information       Follow up With Specialties Details Why Contact Info     TAMMIE [facility], Nurse Practitioner   [address] [address]  [phone]              Patient's Medications   Discharge Prescriptions    OFLOXACIN (OCUFLOX) 0.3 % OPHTHALMIC SOLUTION    Administer 1 drop to the right eye 4 (four) times a day       Start Date: 5/29/2024 End Date: --       Order Dose: 1 drop       Quantity: 5 mL    Refills: 0     No discharge procedures on file.    PDMP Review       None             ED Provider  Attending physically available and evaluated Saw Bernal. I managed the patient along with the ED Attending.    Electronically Signed by           Janes Cavazos MD  05/29/24 5678     PAST MEDICAL HISTORY:  Diabetes     High cholesterol     HTN (hypertension)     Hypothyroidism, adult

## 2024-11-04 NOTE — H&P ADULT - NSHPLABSRESULTS_GEN_ALL_CORE
[facility] SS:  DATE: 11/4/24   TIME: 12:05 PM   1A: Level of consciousness (0-3): 0  1B: Questions (0-2): 0  1C: Commands (0-2): 0  2: Gaze (0-2): 0  3: Visual fields (0-3): 0  4: Facial palsy (0-3): 1  MOTOR:  5A: Left arm motor drift (0-4): 0  5B: Right arm motor drift (0-4): 0  6A: Left leg motor drift (0-4): 0  6B: Right leg motor drift (0-4): 0  7: Limb ataxia (0-2): 0  SENSORY:  8: Sensation (0-2): 0  SPEECH:  9: Language (0-3): 0  10: Dysarthria (0-2): 0  EXTINCTION:  11: Extinction/inattention (0-2): 0    TOTAL SCORE: 1    prehospital mRS=0      LABS:                         13.4   9.35  )-----------( 287      ( 04 Nov 2024 09:59 )             37.6       11-04    134[L]  |  98  |  9.7  ----------------------------<  172[H]  3.5   |  23.0  |  0.61    Ca    8.6      04 Nov 2024 09:59    TPro  6.3[L]  /  Alb  3.3  /  TBili  0.4  /  DBili  x   /  AST  17  /  ALT  15  /  AlkPhos  80  11-04      PT/INR - ( 04 Nov 2024 09:59 )   PT: 11.6 sec;   INR: 1.03 ratio         PTT - ( 04 Nov 2024 09:59 )  PTT:27.7 sec      A1C: 8.9    RADIOLOGY & ADDITIONAL STUDIES (independently reviewed unless otherwise noted):   CT Brain w/o IV Cont, CTA Head/Neck, CT Perufsion w/ IV Cont, Stroke Protocol (11.04.24 @ 10:28)   IMPRESSION:  HEAD CT: No evidence of an acute intracranial hemorhage, midline shiftor   hydrocephalus.  NECK CTA: No hemodynamic significant narowing within the neck.  BRAIN CTA: No proximal large vessel occlusion.  CT PERFUSION: No areas of ischemia identified.    CT Cervical Spine No Cont (11.03.24 @ 15:28)   IMPRESSION:  Cervical spine CT: No acute fractures or dislocations.    Mild degenerative disc disease at C6-C7.    Head CT: No acute intracranial hemorrhage, mass effect, or shift of the   midline structures.

## 2024-11-04 NOTE — CONSULT NOTE ADULT - SUBJECTIVE AND OBJECTIVE BOX
Patient is a 59y old  Female who presents with a chief complaint of Concern for stroke     HPI:  59 year old female, PMHx HTN, HLD, T1DM, hypothyroidism, current tobacco use (~1ppd for 20 years) presented to [facility] ED 11/4/24 for complaint of slurred speech and right sided weakness.   Last known well was 2AM that morning when patient went to sleep; she awoke at 6AM complaining of right upper and lower extremity weakness, as well as word-finding difficulty.   Symptoms resolved after about 20 minutes, however recurred again at 8AM and then gradually resolved. Brought in by [place] mobile stroke unit, imaging obtained prior to   arrival reportedly negative for any acute abnormalities. Asymptomatic upon ED arrival. CT head, CTA head/neck repeated here, no evidence of hemorrhage or acute infarct,   no large vessel occlusion or significant stenosis. Patient currently complaining of headache and neck pain (notes this issue has been ongoing for ~2months, recent hx of epidural steroid injections).   Denies any visual changes, chest pain, shortness of breath, abdominal pain. Admitted to stroke service for further work up.    Has h/o DM type 1, since 24 years, a1c 8.9% on admission, at home on tresiba 36 units daily and admelog 6 to 8 units tid with meals, took last dose of tresiba , last night.  Has h/o hypothyroidism, on levothyroxine 137 mcg daily at home.      PAST MEDICAL & SURGICAL HISTORY:  Diabetes    HTN (hypertension)    Hypothyroidism, adult    High cholesterol    No significant past surgical history        Social History:  Tobacco: reports ~1ppd for >20 years  EtOH: admits to social use  Recreational drug use: Denies (04 Nov 2024 12:54)      FAMILY HISTORY:  no h/o DM type 1        Allergies    No Known Allergies    Intolerances        REVIEW OF SYSTEMS:    weakness right side of body, pain in head and neck, nausea, no abdominal pain, no CP, no sob.        MEDICATIONS  (STANDING):  aspirin enteric coated 81 milliGRAM(s) Oral daily  atorvastatin 80 milliGRAM(s) Oral at bedtime  clopidogrel Tablet 300 milliGRAM(s) Oral once  dextrose 5%. 1000 milliLiter(s) (50 mL/Hr) IV Continuous <Continuous>  dextrose 5%. 1000 milliLiter(s) (100 mL/Hr) IV Continuous <Continuous>  dextrose 50% Injectable 25 Gram(s) IV Push once  dextrose 50% Injectable 12.5 Gram(s) IV Push once  dextrose 50% Injectable 25 Gram(s) IV Push once  enoxaparin Injectable 40 milliGRAM(s) SubCutaneous every 24 hours  glucagon  Injectable 1 milliGRAM(s) IntraMuscular once  insulin glargine Injectable (LANTUS) 36 Unit(s) SubCutaneous at bedtime  insulin lispro (ADMELOG) corrective regimen sliding scale   SubCutaneous three times a day before meals  levothyroxine 137 MICROGram(s) Oral daily  nicotine - 21 mG/24Hr(s) Patch 1 Patch Transdermal daily    MEDICATIONS  (PRN):  dextrose Oral Gel 15 Gram(s) Oral once PRN Blood Glucose LESS THAN 70 milliGRAM(s)/deciliter      Vital Signs Last 24 Hrs  T(C): 37.2 (04 Nov 2024 09:47), Max: 37.2 (04 Nov 2024 09:47)  T(F): 98.9 (04 Nov 2024 09:47), Max: 98.9 (04 Nov 2024 09:47)  HR: 84 (04 Nov 2024 12:32) (75 - 86)  BP: 115/63 (04 Nov 2024 12:32) (105/60 - 117/70)  BP(mean): --  RR: 16 (04 Nov 2024 12:32) (16 - 18)  SpO2: 100% (04 Nov 2024 12:32) (92% - 100%)    Parameters below as of 04 Nov 2024 12:32  Patient On (Oxygen Delivery Method): room air          PHYSICAL EXAM:    Constitutional: NAD, well-groomed, well-developed  HEENT: PERRL, no exophalmos  Neck: No LAD,  no thyroid enlargement  Respiratory: CTAB  Cardiovascular: S1 and S2, RRR, no M/G/R  Gastrointestinal: BS+, soft, no organomegaly  Extremities: No peripheral edema,  Psychiatric: Normal mood, normal affect  Skin: No rashes, no acanthosis        LABS  11-04    134[L]  |  98  |  9.7  ----------------------------<  172[H]  3.5   |  23.0  |  0.61    Ca    8.6      04 Nov 2024 09:59    TPro  6.3[L]  /  Alb  3.3  /  TBili  0.4  /  DBili  x   /  AST  17  /  ALT  15  /  AlkPhos  80  11-04                          13.4   9.35  )-----------( 287      ( 04 Nov 2024 09:59 )             37.6       A1C with Estimated Average Glucose Result: 8.9 % (11-04-24 @ 09:59)          Aspartate Aminotransferase (AST/SGOT): 17 U/L (11-04-24 @ 09:59)  Alanine Aminotransferase (ALT/SGPT): 15 U/L (11-04-24 @ 09:59)  Alkaline Phosphatase: 80 U/L (11-04-24 @ 09:59)  Albumin: 3.3 g/dL (11-04-24 @ 09:59)  Alanine Aminotransferase (ALT/SGPT): 12 U/L (11-03-24 @ 12:40)  Alkaline Phosphatase: 84 U/L (11-03-24 @ 12:40)  Albumin: 3.4 g/dL (11-03-24 @ 12:40)  Aspartate Aminotransferase (AST/SGOT): 16 U/L (11-03-24 @ 12:40)      CAPILLARY BLOOD GLUCOSE      POCT Blood Glucose.: 177 mg/dL (04 Nov 2024 09:44)

## 2024-11-04 NOTE — PATIENT PROFILE ADULT - FALL HARM RISK - HARM RISK INTERVENTIONS

## 2024-11-04 NOTE — ED ADULT NURSE REASSESSMENT NOTE - NS ED NURSE REASSESS COMMENT FT1
Pt received from previous RN Katy NEW Cardiac monitoring maintained.  NIH of 0 at this time.  Pt a&ox4 with no complaints. NAD noted, respirations even and unlabored.  Safety precautions in place.  Plan of care explained, pt verbalized understanding. Awaiting admission orders and MRI.

## 2024-11-04 NOTE — ED ADULT NURSE NOTE - CHIEF COMPLAINT QUOTE
BIBEMS for intermittent right sided facial droop, right sided leg and arm weakness and dysarthria. Reports that she went to bed at  02:00AM normal, woke up at 06:00AM with the symptoms. Code Stroke activated MD Javier OLIVIA at bedside.

## 2024-11-04 NOTE — CHART NOTE - NSCHARTNOTEFT_GEN_A_CORE
Pt had an oral temp 101.2  pt without complaints  NAD VSS B/P 117/72 P 90 R 18 PO 96% RA  WBC WNL  Pt does not meet sepsis criteria  Fever w/u initiated  CBC  Lactate  Blood cultures x 2  CXR  UA  RVP  Ofirmev  Rectal temp 100 ? before Ofirmev  Continue to monitor Pt had an oral temp 101.2  pt without complaints  NAD VSS B/P 117/72 P 90 R 18 PO 96% RA  WBC WNL  Pt does not meet sepsis criteria  Fever w/u initiated                        12.8   11.16 )-----------( 310      ( 04 Nov 2024 23:14 )             35.6     Lactate 1.0  Blood cultures x 2  CXR no obvious infiltrates  UA  RVP  Ofirmev  Rectal temp 100 ? before Ofirmev  Continue to monitor Pt had an oral temp 101.2  pt without complaints  NAD VSS B/P 117/72 P 90 R 18 PO 96% RA  WBC WNL  Pt does not meet sepsis criteria  Fever w/u initiated                        12.8   11.16 )-----------( 310      ( 04 Nov 2024 23:14 )             35.6     Lactate 1.0  Blood cultures x 2  CXR no obvious infiltrates  UA  RVP neg  Ofirmev  Rectal temp 100 ? before Ofirmev  Continue to monitor

## 2024-11-04 NOTE — ED ADULT TRIAGE NOTE - MEANS OF ARRIVAL
stretcher
Social History:    Marital Status:  ( X  )    (   ) Single    (   )    (  )   Lives with: (  ) alone  (  ) children   ( X ) spouse   (  ) parents  (  ) other    Substance Use (street drugs): ( X ) never used  (  ) other:  Tobacco Usage:  (  X ) never smoked   (   ) former smoker   (   ) current smoker  (     ) pack year  (        ) last cigarette date  Alcohol Usage: None

## 2024-11-04 NOTE — PATIENT PROFILE ADULT - NSPROPOAURINARYCATHETER_GEN_A_NUR
Refill Approved: Levothyroxine    Rx renewed per Medication Renewal Policy. Medication was last renewed on 1/10/2018 with 3 refills.   Last office visit: 12/26/2018 with PCP Dr Gordon Cast, [facility] Triage/Med Refill 1/24/2019     Requested Prescriptions   Pending Prescriptions Disp Refills     ELIQUIS 5 mg Tab tablet [Pharmacy Med Name: ELIQUIS 5MG TAB TABLET] 60 tablet 2     Sig: TAKE 1 TABLET (5 MG TOTAL) BY MOUTH 2 (TWO) TIMES A DAY.    Apixaban/Rivaroxaban/Dabigatran Refill Protocol Failed - 1/22/2019  2:18 PM       Failed - Route to appropriate pool/provider    Last Anticoagulation Summary:          Passed - Renal function test in last year    Creatinine   Date Value Ref Range Status   12/26/2018 1.10 0.60 - 1.10 mg/dL Final            Passed - PCP or prescribing provider visit in past 12 months      Last office visit with prescriber/PCP: 12/26/2018 Master Leyva MD OR same dept: 12/26/2018 Master Leyva MD OR same specialty: 12/26/2018 Master Leyva MD  Last physical: Visit date not found Last MTM visit: Visit date not found   Next visit within 3 mo: Visit date not found  Next physical within 3 mo: Visit date not found  Prescriber OR PCP: Master Leyva MD  Last diagnosis associated with med order: 1. Atrial fibrillation -- S/P cardioversion 2015  - ELIQUIS 5 mg Tab tablet [Pharmacy Med Name: ELIQUIS 5MG TAB TABLET]; TAKE 1 TABLET (5 MG TOTAL) BY MOUTH 2 (TWO) TIMES A DAY.  Dispense: 60 tablet; Refill: 2    If protocol passes may refill for 12 months if within 3 months of last provider visit (or a total of 15 months).             levothyroxine (SYNTHROID, LEVOTHROID) 88 MCG tablet [Pharmacy Med Name: LEVOTHYROXINE 88MCG TAB TABLET] 100 tablet 3     Sig: TAKE ONE TABLET BY MOUTH ONCE DAILY    Thyroid Hormones Protocol Passed - 1/22/2019  2:18 PM       Passed - Provider visit in past 12 months or next 3 months    Last office visit with prescriber/PCP: 12/26/2018  Master Leyva MD OR same dept: 12/26/2018 Master Leyva MD OR same specialty: 12/26/2018 Master Leyva MD  Last physical: Visit date not found Last MTM visit: Visit date not found   Next visit within 3 mo: Visit date not found  Next physical within 3 mo: Visit date not found  Prescriber OR PCP: Master Leyva MD  Last diagnosis associated with med order: 1. Atrial fibrillation -- S/P cardioversion 2015  - ELIQUIS 5 mg Tab tablet [Pharmacy Med Name: ELIQUIS 5MG TAB TABLET]; TAKE 1 TABLET (5 MG TOTAL) BY MOUTH 2 (TWO) TIMES A DAY.  Dispense: 60 tablet; Refill: 2    If protocol passes may refill for 12 months if within 3 months of last provider visit (or a total of 15 months).            Passed - TSH on file in past 12 months for patient age 12 & older    TSH   Date Value Ref Range Status   10/30/2018 0.74 0.30 - 5.00 uIU/mL Final                   traMADol (ULTRAM) 50 mg tablet [Pharmacy Med Name: TRAMADOL HCL 50MG TAB TABLET] 60 tablet      Sig: TAKE 1 TABLET (50 MG TOTAL) BY MOUTH EVERY 8 (EIGHT) HOURS AS NEEDED FOR PAIN.    Controlled Substances Refill Protocol Failed - 1/22/2019  2:18 PM       Failed - Route all Controlled Substance Requests to Provider       Failed - Patient has controlled substance agreement in past 12 months    Encounter-Level CSA Scan Date:    There are no encounter-level csa scan date.              Passed - Visit with PCP or prescribing provider visit in past 12 months     Last office visit with prescriber/PCP: 12/26/2018 Master Leyva MD OR same dept: 12/26/2018 Master Leyva MD OR same specialty: 12/26/2018 Master Leyva MD Last physical: Visit date not found Last MTM visit: Visit date not found    Next visit within 3 mo: Visit date not found  Next physical within 3 mo: Visit date not found  Prescriber OR PCP: Master Leyva MD  Last diagnosis associated with med order: 1. Atrial fibrillation -- S/P cardioversion 2015  - ELIQUIS 5 mg Tab tablet [Pharmacy  Med Name: ELIQUIS 5MG TAB TABLET]; TAKE 1 TABLET (5 MG TOTAL) BY MOUTH 2 (TWO) TIMES A DAY.  Dispense: 60 tablet; Refill: 2                            no

## 2024-11-04 NOTE — ED ADULT NURSE REASSESSMENT NOTE - NS ED NURSE REASSESS COMMENT FT1
pt with [facility] 0, stroke team a6t bedside. plan for admission for MRI. family at bedside updated on POC. all concerns addressed at this time, remains on CM, NSR. RR wnl, safety maintained.

## 2024-11-05 LAB
ALBUMIN SERPL ELPH-MCNC: 3.2 G/DL — LOW (ref 3.3–5.2)
ALP SERPL-CCNC: 91 U/L — SIGNIFICANT CHANGE UP (ref 40–120)
ALT FLD-CCNC: 11 U/L — SIGNIFICANT CHANGE UP
ANION GAP SERPL CALC-SCNC: 16 MMOL/L — SIGNIFICANT CHANGE UP (ref 5–17)
APPEARANCE UR: CLEAR — SIGNIFICANT CHANGE UP
AST SERPL-CCNC: 14 U/L — SIGNIFICANT CHANGE UP
BILIRUB DIRECT SERPL-MCNC: 0.1 MG/DL — SIGNIFICANT CHANGE UP (ref 0–0.3)
BILIRUB INDIRECT FLD-MCNC: 0.2 MG/DL — SIGNIFICANT CHANGE UP (ref 0.2–1)
BILIRUB SERPL-MCNC: 0.3 MG/DL — LOW (ref 0.4–2)
BILIRUB UR-MCNC: NEGATIVE — SIGNIFICANT CHANGE UP
BUN SERPL-MCNC: 12.6 MG/DL — SIGNIFICANT CHANGE UP (ref 8–20)
CALCIUM SERPL-MCNC: 8.3 MG/DL — LOW (ref 8.4–10.5)
CHLORIDE SERPL-SCNC: 98 MMOL/L — SIGNIFICANT CHANGE UP (ref 96–108)
CHOLEST SERPL-MCNC: 148 MG/DL — SIGNIFICANT CHANGE UP
CO2 SERPL-SCNC: 20 MMOL/L — LOW (ref 22–29)
COLOR SPEC: YELLOW — SIGNIFICANT CHANGE UP
CREAT SERPL-MCNC: 0.72 MG/DL — SIGNIFICANT CHANGE UP (ref 0.5–1.3)
DIFF PNL FLD: NEGATIVE — SIGNIFICANT CHANGE UP
EGFR: 96 ML/MIN/1.73M2 — SIGNIFICANT CHANGE UP
GLUCOSE BLDC GLUCOMTR-MCNC: 205 MG/DL — HIGH (ref 70–99)
GLUCOSE BLDC GLUCOMTR-MCNC: 212 MG/DL — HIGH (ref 70–99)
GLUCOSE BLDC GLUCOMTR-MCNC: 240 MG/DL — HIGH (ref 70–99)
GLUCOSE BLDC GLUCOMTR-MCNC: 279 MG/DL — HIGH (ref 70–99)
GLUCOSE SERPL-MCNC: 229 MG/DL — HIGH (ref 70–99)
GLUCOSE UR QL: >=1000 MG/DL
HCT VFR BLD CALC: 35.7 % — SIGNIFICANT CHANGE UP (ref 34.5–45)
HDLC SERPL-MCNC: 22 MG/DL — LOW
HGB BLD-MCNC: 12.7 G/DL — SIGNIFICANT CHANGE UP (ref 11.5–15.5)
KETONES UR-MCNC: 40 MG/DL
LEUKOCYTE ESTERASE UR-ACNC: NEGATIVE — SIGNIFICANT CHANGE UP
LIPID PNL WITH DIRECT LDL SERPL: 59 MG/DL — SIGNIFICANT CHANGE UP
MCHC RBC-ENTMCNC: 31 PG — SIGNIFICANT CHANGE UP (ref 27–34)
MCHC RBC-ENTMCNC: 35.6 G/DL — SIGNIFICANT CHANGE UP (ref 32–36)
MCV RBC AUTO: 87.1 FL — SIGNIFICANT CHANGE UP (ref 80–100)
NITRITE UR-MCNC: NEGATIVE — SIGNIFICANT CHANGE UP
NON HDL CHOLESTEROL: 126 MG/DL — SIGNIFICANT CHANGE UP
PH UR: 6 — SIGNIFICANT CHANGE UP (ref 5–8)
PLATELET # BLD AUTO: 353 K/UL — SIGNIFICANT CHANGE UP (ref 150–400)
POTASSIUM SERPL-MCNC: 3.5 MMOL/L — SIGNIFICANT CHANGE UP (ref 3.5–5.3)
POTASSIUM SERPL-SCNC: 3.5 MMOL/L — SIGNIFICANT CHANGE UP (ref 3.5–5.3)
PROT SERPL-MCNC: 5.8 G/DL — LOW (ref 6.6–8.7)
PROT UR-MCNC: SIGNIFICANT CHANGE UP MG/DL
RAPID RVP RESULT: SIGNIFICANT CHANGE UP
RBC # BLD: 4.1 M/UL — SIGNIFICANT CHANGE UP (ref 3.8–5.2)
RBC # FLD: 12.1 % — SIGNIFICANT CHANGE UP (ref 10.3–14.5)
SARS-COV-2 RNA SPEC QL NAA+PROBE: SIGNIFICANT CHANGE UP
SODIUM SERPL-SCNC: 134 MMOL/L — LOW (ref 135–145)
SP GR SPEC: >1.03 — HIGH (ref 1–1.03)
T3 SERPL-MCNC: 51 NG/DL — LOW (ref 80–200)
T4 AB SER-ACNC: 5.3 UG/DL — SIGNIFICANT CHANGE UP (ref 4.5–12)
TRIGL SERPL-MCNC: 337 MG/DL — HIGH
TSH SERPL-MCNC: 0.46 UIU/ML — SIGNIFICANT CHANGE UP (ref 0.27–4.2)
UROBILINOGEN FLD QL: 1 MG/DL — SIGNIFICANT CHANGE UP (ref 0.2–1)
WBC # BLD: 10.34 K/UL — SIGNIFICANT CHANGE UP (ref 3.8–10.5)
WBC # FLD AUTO: 10.34 K/UL — SIGNIFICANT CHANGE UP (ref 3.8–10.5)

## 2024-11-05 PROCEDURE — 99233 SBSQ HOSP IP/OBS HIGH 50: CPT

## 2024-11-05 PROCEDURE — 99232 SBSQ HOSP IP/OBS MODERATE 35: CPT

## 2024-11-05 RX ORDER — METHOCARBAMOL 500 MG/1
750 TABLET ORAL ONCE
Refills: 0 | Status: COMPLETED | OUTPATIENT
Start: 2024-11-05 | End: 2024-11-05

## 2024-11-05 RX ORDER — ACETAMINOPHEN 500 MG
1000 TABLET ORAL ONCE
Refills: 0 | Status: COMPLETED | OUTPATIENT
Start: 2024-11-05 | End: 2024-11-06

## 2024-11-05 RX ORDER — INSULIN LISPRO 100/ML
8 VIAL (ML) SUBCUTANEOUS
Refills: 0 | Status: DISCONTINUED | OUTPATIENT
Start: 2024-11-05 | End: 2024-11-09

## 2024-11-05 RX ORDER — METHOCARBAMOL 500 MG/1
750 TABLET ORAL DAILY
Refills: 0 | Status: DISCONTINUED | OUTPATIENT
Start: 2024-11-06 | End: 2024-11-08

## 2024-11-05 RX ADMIN — Medication 2: at 12:49

## 2024-11-05 RX ADMIN — Medication 81 MILLIGRAM(S): at 10:45

## 2024-11-05 RX ADMIN — Medication 2: at 16:15

## 2024-11-05 RX ADMIN — Medication 80 MILLIGRAM(S): at 22:07

## 2024-11-05 RX ADMIN — CLOPIDOGREL 75 MILLIGRAM(S): 75 TABLET ORAL at 10:45

## 2024-11-05 RX ADMIN — Medication 8 UNIT(S): at 12:50

## 2024-11-05 RX ADMIN — Medication 6 UNIT(S): at 08:33

## 2024-11-05 RX ADMIN — Medication 36 UNIT(S): at 22:07

## 2024-11-05 RX ADMIN — METHOCARBAMOL 750 MILLIGRAM(S): 500 TABLET ORAL at 11:18

## 2024-11-05 RX ADMIN — Medication 8 UNIT(S): at 16:15

## 2024-11-05 RX ADMIN — Medication 137 MICROGRAM(S): at 05:32

## 2024-11-05 RX ADMIN — Medication 2: at 08:33

## 2024-11-05 RX ADMIN — Medication 40 MILLIGRAM(S): at 08:35

## 2024-11-05 NOTE — DISCHARGE NOTE PROVIDER - NSDCCPCAREPLAN_GEN_ALL_CORE_FT
PRINCIPAL DISCHARGE DIAGNOSIS  Diagnosis: TIA (transient ischemic attack)  Assessment and Plan of Treatment: You were admitted to the hospital because you had a transient ischemic attack, or a warning sign for stroke.  You have these risks factors of strokes:  -high blood pressure (Hypertension), Maintain a journal record of your blood pressure twice a day to show your primary care/cardiologist .   -diabetes mellitus - please see your primary care provider and/or endocrinologist for further recommendations regarding your diabetic regimen also monitor your finger stick glucose as recommended by your primary care and/or endocrinologist in a journal.   -high cholesterol (also called hyperlipidemia) - please see nutritionist and take your statin therapy if you were prescribed one.  Your liver function will need to be monitored.   -smoking, please stop smoking as it increases your risk for stroke and other systemic disease processes.   -Hypercoagulable state - please follow up with hematology and/or rheumatology   -still undefined - will continue to be evaluated and monitored as an outpatient  For secondary stroke prevention please take your medications as prescribed. If you run low on your medication, please contact your doctor before you run out.  You will follow up outpatient with the vascular neurology team, the office will call you within 3 days of discharge to schedule an appointment.  If you do not hear from the office please call the phone number provided.    Please see all regularly scheduled providers as prior to your admission. Please see your primary care doctor within one week of discharge.  In addition discuss with your primary care provider regarding candidacy for routine malignancy screenings.   Adjustments to your regular tasks may be difficult after you've had a stroke, but you can utilize  new ways/assistance as needed to manage your daily activities based on the recommendations of your physical, occupational, speech and language team if applicable.    Call 911 if you or someone you know experiences the following symptoms of stroke (can be remembered by BE FAST):  •     PRINCIPAL DISCHARGE DIAGNOSIS  Diagnosis: TIA (transient ischemic attack)  Assessment and Plan of Treatment: You were admitted to the hospital because you had a transient ischemic attack, or a warning sign for stroke.  You have these risks factors of strokes:  -high blood pressure (Hypertension)  -diabetes mellitus  -high cholesterol (also called hyperlipidemia)  -smoking --> please stop smoking as it increases your risk for stroke and other systemic disease processes.   For secondary stroke prevention please take your medications as prescribed. If you run low on your medication, please contact your doctor before you run out.  You will follow up outpatient with the vascular neurology team, the office will call you within 3 days of discharge to schedule an appointment.  If you do not hear from the office please call the phone number provided.    Please see all regularly scheduled providers as prior to your admission. Please see your primary care doctor within one week of discharge.  In addition discuss with your primary care provider regarding candidacy for routine malignancy screenings.   Adjustments to your regular tasks may be difficult after you've had a stroke, but you can utilize  new ways/assistance as needed to manage your daily activities based on the recommendations of your physical, occupational, speech and language team if applicable.    Call 911 if you or someone you know experiences the following symptoms of stroke (can be remembered by BE FAST):  •Balance: Dizziness, loss of balance, or a sense of falling  •Eyes: Sudden double vision, loss of vision, or blurred vision  •Face: drooping of one side of the face  •Arm: arm weakness or drifting  •Speech:  Sudden trouble talking or slurred speech, trouble understanding others  •Time: Time to call for an ambulance fast!      SECONDARY DISCHARGE DIAGNOSES  Diagnosis: Type 2 diabetes mellitus  Assessment and Plan of Treatment: Please see your primary care provider and endocrinologist for further recommendations regarding your diabetic regimen also monitor your finger stick glucose as recommended by your primary care and/or endocrinologist in a journal.   Endocrinology appt 12/3/24.    Diagnosis: Hyperlipidemia  Assessment and Plan of Treatment: Please see your PCP and/or nutritionist and take your statin therapy if you were prescribed one.  Your liver function will need to be monitored.    Diagnosis: HTN (hypertension)  Assessment and Plan of Treatment: Maintain a journal record of your blood pressure twice a day to show your primary care/cardiologist.  If you are prescribed blood pressure medication(s), take them as prescribed, do not miss doses.     PRINCIPAL DISCHARGE DIAGNOSIS  Diagnosis: TIA (transient ischemic attack)  Assessment and Plan of Treatment: You were admitted to the hospital because you had a transient ischemic attack, or a warning sign for stroke.  You have these risks factors of strokes:  -high blood pressure (Hypertension)  -diabetes mellitus  -high cholesterol (also called hyperlipidemia)  -smoking --> please stop smoking as it increases your risk for stroke and other systemic disease processes.   For secondary stroke prevention please take your medications as prescribed. If you run low on your medication, please contact your doctor before you run out.  You will follow up outpatient with the vascular neurology team, the office will call you within 3 days of discharge to schedule an appointment.  If you do not hear from the office please call the phone number provided.    Please see all regularly scheduled providers as prior to your admission. Please see your primary care doctor within one week of discharge.  In addition discuss with your primary care provider regarding candidacy for routine malignancy screenings.   Adjustments to your regular tasks may be difficult after you've had a stroke, but you can utilize  new ways/assistance as needed to manage your daily activities based on the recommendations of your physical, occupational, speech and language team if applicable.    Call 911 if you or someone you know experiences the following symptoms of stroke (can be remembered by BE FAST):  •Balance: Dizziness, loss of balance, or a sense of falling  •Eyes: Sudden double vision, loss of vision, or blurred vision  •Face: drooping of one side of the face  •Arm: arm weakness or drifting  •Speech:  Sudden trouble talking or slurred speech, trouble understanding others  •Time: Time to call for an ambulance fast!      SECONDARY DISCHARGE DIAGNOSES  Diagnosis: Type 2 diabetes mellitus  Assessment and Plan of Treatment: Please see your primary care provider and endocrinologist for further recommendations regarding your diabetic regimen also monitor your finger stick glucose as recommended by your primary care and/or endocrinologist in a journal.   Endocrinology appt 12/3/24.    Diagnosis: Hyperlipidemia  Assessment and Plan of Treatment: Please see your PCP and/or nutritionist and take your statin therapy if you were prescribed one.  Your liver function will need to be monitored.    Diagnosis: HTN (hypertension)  Assessment and Plan of Treatment: Maintain a journal record of your blood pressure twice a day to show your primary care/cardiologist.  If you are prescribed blood pressure medication(s), take them as prescribed, do not miss doses.    Diagnosis: Adenopathy  Assessment and Plan of Treatment: Some of your lymph nodes are swollen. We started some work-up in the hospital that has been negative so far, but you need to follow up with heme/onc within 1 week after discharge

## 2024-11-05 NOTE — PHYSICAL THERAPY INITIAL EVALUATION ADULT - ADDITIONAL COMMENTS
as per pt: resides in the private house with 12 steps (+) rail to the leaving area, (-) DME, denies hx of falling

## 2024-11-05 NOTE — DISCHARGE NOTE PROVIDER - ATTENDING DISCHARGE PHYSICAL EXAMINATION:
VITALS:   T(C): 36.9 (11-10-24 @ 08:47), Max: 38.1 (11-09-24 @ 20:31)  HR: 70 (11-10-24 @ 08:47) (62 - 75)  BP: 133/75 (11-10-24 @ 08:47) (117/74 - 149/76)  RR: 18 (11-10-24 @ 08:47) (17 - 18)  SpO2: 95% (11-10-24 @ 08:47) (94% - 96%)    GENERAL: NAD, lying in bed comfortably  HEAD:  Atraumatic, normocephalic  EYES: EOMI, PERRLA, conjunctiva and sclera clear  ENT: Moist mucous membranes  NECK: Supple, no JVD  HEART: Regular rate and rhythm, no murmurs, rubs, or gallops  LUNGS: Unlabored respirations.  Clear to auscultation bilaterally, no crackles, wheezing, or rhonchi  ABDOMEN: Soft, nontender, nondistended, +BS  EXTREMITIES: 2+ peripheral pulses bilaterally. No clubbing, cyanosis, or edema  NERVOUS SYSTEM:  A&Ox3, no focal deficits   SKIN: No rashes or lesions

## 2024-11-05 NOTE — PHYSICAL THERAPY INITIAL EVALUATION ADULT - DIAGNOSIS, PT EVAL
Pt is not a candidate for  PT services in current setting due to lack of functional goals. (3) slightly limited

## 2024-11-05 NOTE — PROGRESS NOTE ADULT - SUBJECTIVE AND OBJECTIVE BOX
INCOMPLETE  Preliminary note, official recommendations pending attending review/signature   Seen and examined by Stroke team attending/team, assessment/ plan as discussed with stroke team attending/team as noted.     [facility] Stroke Team  Progress Note     HPI:  59 year old female, PMHx HTN, HLD, T1DM, hypothyroidism, current tobacco use (~1ppd for 20 years) presented to [facility] ED 11/4/24 for complaint of slurred speech and right sided weakness. Last known well was 2AM that morning when patient went to sleep; she awoke at 6AM complaining of right upper and lower extremity weakness, as well as word-finding difficulty. Symptoms resolved after about 20 minutes, however recurred again at 8AM and then gradually resolved. Brought in by [place] mobile stroke unit, imaging obtained prior to arrival reportedly negative for any acute abnormalities. Asymptomatic upon ED arrival. CT head, CTA head/neck repeated here, no evidence of hemorrhage or acute infarct, no large vessel occlusion or significant stenosis. Patient currently complaining of headache and neck pain (notes this issue has been ongoing for ~2months, recent hx of epidural steroid injections). Denies any visual changes, chest pain, shortness of breath, abdominal pain. Admitted to stroke service for further work up.       SUBJECTIVE: Sepsis workup overnight for TMAX 101.2. No events overnight.  No new neurologic complaints.  ROS reported negative unless otherwise noted.    aspirin enteric coated 81 milliGRAM(s) Oral daily  atorvastatin 80 milliGRAM(s) Oral at bedtime  clopidogrel Tablet 75 milliGRAM(s) Oral daily  dextrose 5%. 1000 milliLiter(s) IV Continuous <Continuous>  dextrose 5%. 1000 milliLiter(s) IV Continuous <Continuous>  dextrose 50% Injectable 12.5 Gram(s) IV Push once  dextrose 50% Injectable 25 Gram(s) IV Push once  dextrose 50% Injectable 25 Gram(s) IV Push once  dextrose Oral Gel 15 Gram(s) Oral once PRN  enoxaparin Injectable 40 milliGRAM(s) SubCutaneous every 24 hours  glucagon  Injectable 1 milliGRAM(s) IntraMuscular once  influenza   Vaccine 0.5 milliLiter(s) IntraMuscular once  insulin glargine Injectable (LANTUS) 36 Unit(s) SubCutaneous at bedtime  insulin lispro (ADMELOG) corrective regimen sliding scale   SubCutaneous three times a day before meals  insulin lispro Injectable (ADMELOG) 6 Unit(s) SubCutaneous three times a day before meals  levothyroxine 137 MICROGram(s) Oral daily  nicotine - 21 mG/24Hr(s) Patch 1 Patch Transdermal daily      PHYSICAL EXAM:   Vital Signs Last 24 Hrs  T(C): 36.9 (05 Nov 2024 07:46), Max: 38.4 (04 Nov 2024 22:28)  T(F): 98.5 (05 Nov 2024 07:46), Max: 101.2 (04 Nov 2024 22:28)  HR: 73 (05 Nov 2024 07:46) (68 - 90)  BP: 112/59 (05 Nov 2024 07:46) (98/63 - 134/76)  BP(mean): 77 (05 Nov 2024 05:47) (75 - 77)  RR: 18 (05 Nov 2024 07:46) (16 - 20)  SpO2: 96% (05 Nov 2024 07:46) (95% - 100%)    Parameters below as of 05 Nov 2024 07:46  Patient On (Oxygen Delivery Method): room air    General: No acute distress.   HEENT: Head normocephalic, atraumatic. Conjunctivae clear w/o exudates or hemorrhage. Sclera non-icteric. Nares are patent bilaterally.   Neck: Supple, no adenopathy. Trachea midline. No JVD.  Cardiac: Normal rate and rhythm. S1, S2 auscultated. No murmurs, gallops, or rubs.     Respiratory: Lung sounds clear in all fields. Chest wall symmetric, nontender.   Abdominal: Soft, nondistended, nontender. Bowel sounds normoactive x 4 quadrants.   Skin: Skin is warm, dry and intact without rashes or lesions.   Extremities: No edema, no calf tenderness      Detailed Neurologic Exam:    Mental status: The patient is awake and alert and has normal attention span.  The patient is fully oriented in 3 spheres. The patient is oriented to current events. The patient is able to name objects, follow commands, repeat sentences.    Cranial nerves: Pupils equal and react symmetrically to light. There is no visual field deficit to confrontation. Extraocular motion is full with no nystagmus. There is no ptosis. Facial sensation is intact. Slight R facial asymmetry noted. Palate elevates symmetrically. Tongue is midline.    Motor: There is normal bulk and tone.  There is no tremor.  Strength is 5/5 in the right arm and leg.   Strength is 5/5 in the left arm and leg.    Sensation: Intact to light touch in 4 extremities    Cerebellar: There is no dysmetria on finger to nose testing.    LABS:                        12.8   11.16 )-----------( 310      ( 04 Nov 2024 23:14 )             35.6    11-04    134[L]  |  98  |  9.7  ----------------------------<  172[H]  3.5   |  23.0  |  0.61    Ca    8.6      04 Nov 2024 09:59    TPro  6.3[L]  /  Alb  3.3  /  TBili  0.4  /  DBili  x   /  AST  17  /  ALT  15  /  AlkPhos  80  11-04  PT/INR - ( 04 Nov 2024 09:59 )   PT: 11.6 sec;   INR: 1.03 ratio         PTT - ( 04 Nov 2024 09:59 )  PTT:27.7 sec      A1C: 8.9  LDL: pending    RADIOLOGY & ADDITIONAL STUDIES (independently reviewed unless otherwise noted):   CT Brain w/o IV Cont, CTA Head/Neck, CT Perufsion w/ IV Cont, Stroke Protocol (11.04.24 @ 10:28)   IMPRESSION:  HEAD CT: No evidence of an acute intracranial hemorhage, midline shiftor   hydrocephalus.  NECK CTA: No hemodynamic significant narowing within the neck.  BRAIN CTA: No proximal large vessel occlusion.  CT PERFUSION: No areas of ischemia identified.    CT Cervical Spine No Cont (11.03.24 @ 15:28)   IMPRESSION:  Cervical spine CT: No acute fractures or dislocations.    Mild degenerative disc disease at C6-C7.    Head CT: No acute intracranial hemorrhage, mass effect, or shift of the   midline structures.    MR Head No Cont (11.04.24 @ 20:09)  IMPRESSION:  1)  Unremarkable MR study of the brain. No ischemic changes or   demyelinating foci noted. No hemorrhagic lesion or mass identified.  2)  clear sinuses and mastoids..     INCOMPLETE  Preliminary note, official recommendations pending attending review/signature   Seen and examined by Stroke team attending/team, assessment/ plan as discussed with stroke team attending/team as noted.     [facility] Stroke Team  Progress Note     HPI:  59 year old female, PMHx HTN, HLD, T1DM, hypothyroidism, current tobacco use (~1ppd for 20 years) presented to [facility] ED 11/4/24 for complaint of slurred speech and right sided weakness. Last known well was 2AM that morning when patient went to sleep; she awoke at 6AM complaining of right upper and lower extremity weakness, as well as word-finding difficulty. Symptoms resolved after about 20 minutes, however recurred again at 8AM and then gradually resolved. Brought in by [place] mobile stroke unit, imaging obtained prior to arrival reportedly negative for any acute abnormalities. Asymptomatic upon ED arrival. CT head, CTA head/neck repeated here, no evidence of hemorrhage or acute infarct, no large vessel occlusion or significant stenosis. Patient currently complaining of headache and neck pain (notes this issue has been ongoing for ~2months, recent hx of epidural steroid injections). Denies any visual changes, chest pain, shortness of breath, abdominal pain. Admitted to stroke service for further work up.       SUBJECTIVE: Sepsis workup overnight for TMAX 101.2. No events overnight.  No new neurologic complaints.  ROS reported negative unless otherwise noted.    aspirin enteric coated 81 milliGRAM(s) Oral daily  atorvastatin 80 milliGRAM(s) Oral at bedtime  clopidogrel Tablet 75 milliGRAM(s) Oral daily  dextrose 5%. 1000 milliLiter(s) IV Continuous <Continuous>  dextrose 5%. 1000 milliLiter(s) IV Continuous <Continuous>  dextrose 50% Injectable 12.5 Gram(s) IV Push once  dextrose 50% Injectable 25 Gram(s) IV Push once  dextrose 50% Injectable 25 Gram(s) IV Push once  dextrose Oral Gel 15 Gram(s) Oral once PRN  enoxaparin Injectable 40 milliGRAM(s) SubCutaneous every 24 hours  glucagon  Injectable 1 milliGRAM(s) IntraMuscular once  influenza   Vaccine 0.5 milliLiter(s) IntraMuscular once  insulin glargine Injectable (LANTUS) 36 Unit(s) SubCutaneous at bedtime  insulin lispro (ADMELOG) corrective regimen sliding scale   SubCutaneous three times a day before meals  insulin lispro Injectable (ADMELOG) 6 Unit(s) SubCutaneous three times a day before meals  levothyroxine 137 MICROGram(s) Oral daily  nicotine - 21 mG/24Hr(s) Patch 1 Patch Transdermal daily      PHYSICAL EXAM:   Vital Signs Last 24 Hrs  T(C): 36.9 (05 Nov 2024 07:46), Max: 38.4 (04 Nov 2024 22:28)  T(F): 98.5 (05 Nov 2024 07:46), Max: 101.2 (04 Nov 2024 22:28)  HR: 73 (05 Nov 2024 07:46) (68 - 90)  BP: 112/59 (05 Nov 2024 07:46) (98/63 - 134/76)  BP(mean): 77 (05 Nov 2024 05:47) (75 - 77)  RR: 18 (05 Nov 2024 07:46) (16 - 20)  SpO2: 96% (05 Nov 2024 07:46) (95% - 100%)    Parameters below as of 05 Nov 2024 07:46  Patient On (Oxygen Delivery Method): room air    General: No acute distress.   HEENT: Head normocephalic, atraumatic. Conjunctivae clear w/o exudates or hemorrhage. Sclera non-icteric. Nares are patent bilaterally.   Neck: Supple, no adenopathy. Trachea midline. No JVD.  Cardiac: Normal rate and rhythm. S1, S2 auscultated. No murmurs, gallops, or rubs.     Respiratory: Lung sounds clear in all fields. Chest wall symmetric, nontender.   Abdominal: Soft, nondistended, nontender. Bowel sounds normoactive x 4 quadrants.   Skin: Skin is warm, dry and intact without rashes or lesions.   Extremities: No edema, no calf tenderness      Detailed Neurologic Exam:    Mental status: The patient is awake and alert and has normal attention span.  The patient is fully oriented in 3 spheres. The patient is oriented to current events. The patient is able to name objects, follow commands, repeat sentences.    Cranial nerves: Pupils equal and react symmetrically to light. There is no visual field deficit to confrontation. Extraocular motion is full with no nystagmus. There is no ptosis. Facial sensation is intact. Slight R facial asymmetry noted. Palate elevates symmetrically. Tongue is midline.    Motor: There is normal bulk and tone.  There is no tremor.  Strength is 5/5 in the right arm and leg.   Strength is 5/5 in the left arm and leg.    Sensation: Intact to light touch in 4 extremities    Cerebellar: There is no dysmetria on finger to nose testing.    LABS:                        12.8   11.16 )-----------( 310      ( 04 Nov 2024 23:14 )             35.6    11-04    134[L]  |  98  |  9.7  ----------------------------<  172[H]  3.5   |  23.0  |  0.61    Ca    8.6      04 Nov 2024 09:59    TPro  6.3[L]  /  Alb  3.3  /  TBili  0.4  /  DBili  x   /  AST  17  /  ALT  15  /  AlkPhos  80  11-04  PT/INR - ( 04 Nov 2024 09:59 )   PT: 11.6 sec;   INR: 1.03 ratio         PTT - ( 04 Nov 2024 09:59 )  PTT:27.7 sec      A1C: 8.9  LDL: pending    RADIOLOGY & ADDITIONAL STUDIES (independently reviewed unless otherwise noted):   CT Brain w/o IV Cont, CTA Head/Neck, CT Perufsion w/ IV Cont, Stroke Protocol (11.04.24 @ 10:28)   IMPRESSION:  HEAD CT: No evidence of an acute intracranial hemorhage, midline shiftor   hydrocephalus.  NECK CTA: No hemodynamic significant narowing within the neck.  BRAIN CTA: No proximal large vessel occlusion.  CT PERFUSION: No areas of ischemia identified.    CT Cervical Spine No Cont (11.03.24 @ 15:28)   IMPRESSION:  Cervical spine CT: No acute fractures or dislocations.    Mild degenerative disc disease at C6-C7.    Head CT: No acute intracranial hemorrhage, mass effect, or shift of the   midline structures.    MR Head No Cont (11.04.24 @ 20:09)  IMPRESSION:  1)  Unremarkable MR study of the brain. No ischemic changes or   demyelinating foci noted. No hemorrhagic lesion or mass identified.  2)  clear sinuses and mastoids..    Cardiology   TTE W or WO Ultrasound Enhancing Agent (11.04.24 @ 15:37)   CONCLUSIONS:   1. Left ventricular systolic function is normal with an ejection fraction of 59 % by Schneider's method of disks. There are no regional wall motion abnormalities seen.   2. Normal left ventricular diastolic function.   3. Normal right ventricular cavity size and normal right ventricular systolic function.   4. No significant valvular disease.   5. Agitated saline injection was negative for intracardiac shunt.   6. Estimated pulmonary artery systolic pressure is 35 mmHg.   7. No prior echocardiogram is available for comparison.    Ultrasound  US Duplex Venous Lower Ext Complete, Bilateral (11.04.24 @ 13:56)   IMPRESSION:  No evidence of deep venous thrombosis in either lower extremity.    XRAY  Xray Chest 1 View- PORTABLE-Urgent (11.04.24 @ 10:40)  IMPRESSION:   No radiographic evidence of active chest disease..         Preliminary note, official recommendations pending attending review/signature   Seen and examined by Stroke team attending/team, assessment/ plan as discussed with stroke team attending/team as noted.     [facility] Stroke Team  Progress Note     HPI:  59 year old female, PMHx HTN, HLD, T1DM, hypothyroidism, current tobacco use (~1ppd for 20 years) presented to [facility] ED 11/4/24 for complaint of slurred speech and right sided weakness. Last known well was 2AM that morning when patient went to sleep; she awoke at 6AM complaining of right upper and lower extremity weakness, as well as word-finding difficulty. Symptoms resolved after about 20 minutes, however recurred again at 8AM and then gradually resolved. Brought in by [facility] mobile stroke unit, imaging obtained prior to arrival reportedly negative for any acute abnormalities. Asymptomatic upon ED arrival. CT head, CTA head/neck repeated here, no evidence of hemorrhage or acute infarct, no large vessel occlusion or significant stenosis. Patient currently complaining of headache and neck pain (notes this issue has been ongoing for ~2months, recent hx of epidural steroid injections). Denies any visual changes, chest pain, shortness of breath, abdominal pain. Admitted to stroke service for further work up.     SUBJECTIVE: Sepsis workup overnight for TMAX 101.2. No events overnight.  No new neurologic complaints.  ROS reported negative unless otherwise noted.    aspirin enteric coated 81 milliGRAM(s) Oral daily  atorvastatin 80 milliGRAM(s) Oral at bedtime  clopidogrel Tablet 75 milliGRAM(s) Oral daily  dextrose 5%. 1000 milliLiter(s) IV Continuous <Continuous>  dextrose 5%. 1000 milliLiter(s) IV Continuous <Continuous>  dextrose 50% Injectable 12.5 Gram(s) IV Push once  dextrose 50% Injectable 25 Gram(s) IV Push once  dextrose 50% Injectable 25 Gram(s) IV Push once  dextrose Oral Gel 15 Gram(s) Oral once PRN  enoxaparin Injectable 40 milliGRAM(s) SubCutaneous every 24 hours  glucagon  Injectable 1 milliGRAM(s) IntraMuscular once  influenza   Vaccine 0.5 milliLiter(s) IntraMuscular once  insulin glargine Injectable (LANTUS) 36 Unit(s) SubCutaneous at bedtime  insulin lispro (ADMELOG) corrective regimen sliding scale   SubCutaneous three times a day before meals  insulin lispro Injectable (ADMELOG) 6 Unit(s) SubCutaneous three times a day before meals  levothyroxine 137 MICROGram(s) Oral daily  nicotine - 21 mG/24Hr(s) Patch 1 Patch Transdermal daily      PHYSICAL EXAM:   Vital Signs Last 24 Hrs  T(C): 36.9 (05 Nov 2024 07:46), Max: 38.4 (04 Nov 2024 22:28)  T(F): 98.5 (05 Nov 2024 07:46), Max: 101.2 (04 Nov 2024 22:28)  HR: 73 (05 Nov 2024 07:46) (68 - 90)  BP: 112/59 (05 Nov 2024 07:46) (98/63 - 134/76)  BP(mean): 77 (05 Nov 2024 05:47) (75 - 77)  RR: 18 (05 Nov 2024 07:46) (16 - 20)  SpO2: 96% (05 Nov 2024 07:46) (95% - 100%)    Parameters below as of 05 Nov 2024 07:46  Patient On (Oxygen Delivery Method): room air    General: No acute distress.   HEENT: Head normocephalic, atraumatic. Conjunctivae clear w/o exudates or hemorrhage. Sclera non-icteric. Nares are patent bilaterally.   Neck: Supple, no adenopathy. Trachea midline. No JVD.  Cardiac: Normal rate and rhythm. S1, S2 auscultated. No murmurs, gallops, or rubs.     Respiratory: Lung sounds clear in all fields. Chest wall symmetric, nontender.   Abdominal: Soft, nondistended, nontender. Bowel sounds normoactive x 4 quadrants.   Skin: Skin is warm, dry and intact without rashes or lesions.   Extremities: No edema, no calf tenderness      Detailed Neurologic Exam:    Mental status: The patient is awake and alert and has normal attention span.  The patient is fully oriented in 3 spheres. The patient is oriented to current events. The patient is able to name objects, follow commands, repeat sentences.    Cranial nerves: Pupils equal and react symmetrically to light. There is no visual field deficit to confrontation. Extraocular motion is full with no nystagmus. There is no ptosis. Facial sensation is intact. Slight R facial asymmetry noted. Palate elevates symmetrically. Tongue is midline.    Motor: There is normal bulk and tone.  There is no tremor.  Strength is 5/5 in the right arm and leg.   Strength is 5/5 in the left arm and leg.    Sensation: Intact to light touch in 4 extremities    Cerebellar: There is no dysmetria on finger to nose testing.    LABS:                        12.8   11.16 )-----------( 310      ( 04 Nov 2024 23:14 )             35.6    11-04    134[L]  |  98  |  9.7  ----------------------------<  172[H]  3.5   |  23.0  |  0.61    Ca    8.6      04 Nov 2024 09:59    TPro  6.3[L]  /  Alb  3.3  /  TBili  0.4  /  DBili  x   /  AST  17  /  ALT  15  /  AlkPhos  80  11-04  PT/INR - ( 04 Nov 2024 09:59 )   PT: 11.6 sec;   INR: 1.03 ratio         PTT - ( 04 Nov 2024 09:59 )  PTT:27.7 sec      A1C: 8.9  LDL: pending    RADIOLOGY & ADDITIONAL STUDIES (independently reviewed unless otherwise noted):   CT Brain w/o IV Cont, CTA Head/Neck, CT Perufsion w/ IV Cont, Stroke Protocol (11.04.24 @ 10:28)   IMPRESSION:  HEAD CT: No evidence of an acute intracranial hemorrhage midline shiftor   hydrocephalus.  NECK CTA: No hemodynamic significant narrowing within the neck.  BRAIN CTA: No proximal large vessel occlusion.  CT PERFUSION: No areas of ischemia identified.    CT Cervical Spine No Cont (11.03.24 @ 15:28)   IMPRESSION:  Cervical spine CT: No acute fractures or dislocations.    Mild degenerative disc disease at C6-C7.    Head CT: No acute intracranial hemorrhage, mass effect, or shift of the   midline structures.    MR Head No Cont (11.04.24 @ 20:09)  IMPRESSION:  1)  Unremarkable MR study of the brain. No ischemic changes or   demyelinating foci noted. No hemorrhagic lesion or mass identified.  2)  clear sinuses and mastoids..    Cardiology   TTE W or WO Ultrasound Enhancing Agent (11.04.24 @ 15:37)   CONCLUSIONS:   1. Left ventricular systolic function is normal with an ejection fraction of 59 % by Schneider's method of disks. There are no regional wall motion abnormalities seen.   2. Normal left ventricular diastolic function.   3. Normal right ventricular cavity size and normal right ventricular systolic function.   4. No significant valvular disease.   5. Agitated saline injection was negative for intracardiac shunt.   6. Estimated pulmonary artery systolic pressure is 35 mmHg.   7. No prior echocardiogram is available for comparison.    Ultrasound  US Duplex Venous Lower Ext Complete, Bilateral (11.04.24 @ 13:56)   IMPRESSION:  No evidence of deep venous thrombosis in either lower extremity.    XRAY  Xray Chest 1 View- PORTABLE-Urgent (11.04.24 @ 10:40)  IMPRESSION:   No radiographic evidence of active chest disease..           [facility] Stroke Team  Progress Note     HPI:  59 year old female, PMHx HTN, HLD, T1DM, hypothyroidism, current tobacco use (~1ppd for 20 years) presented to [facility] ED 11/4/24 for complaint of slurred speech and right sided weakness. Last known well was 2AM that morning when patient went to sleep; she awoke at 6AM complaining of right upper and lower extremity weakness, as well as word-finding difficulty. Symptoms resolved after about 20 minutes, however recurred again at 8AM and then gradually resolved. Brought in by [facility] stroke unit, imaging obtained prior to arrival reportedly negative for any acute abnormalities. Asymptomatic upon ED arrival. CT head, CTA head/neck repeated here, no evidence of hemorrhage or acute infarct, no large vessel occlusion or significant stenosis. Patient currently complaining of headache and neck pain (notes this issue has been ongoing for ~2months, recent hx of epidural steroid injections). Denies any visual changes, chest pain, shortness of breath, abdominal pain. Admitted to stroke service for further work up.     SUBJECTIVE: Sepsis workup overnight for TMAX 101.2. No events overnight.  No new neurologic complaints.  ROS reported negative unless otherwise noted.    aspirin enteric coated 81 milliGRAM(s) Oral daily  atorvastatin 80 milliGRAM(s) Oral at bedtime  clopidogrel Tablet 75 milliGRAM(s) Oral daily  dextrose 5%. 1000 milliLiter(s) IV Continuous <Continuous>  dextrose 5%. 1000 milliLiter(s) IV Continuous <Continuous>  dextrose 50% Injectable 12.5 Gram(s) IV Push once  dextrose 50% Injectable 25 Gram(s) IV Push once  dextrose 50% Injectable 25 Gram(s) IV Push once  dextrose Oral Gel 15 Gram(s) Oral once PRN  enoxaparin Injectable 40 milliGRAM(s) SubCutaneous every 24 hours  glucagon  Injectable 1 milliGRAM(s) IntraMuscular once  influenza   Vaccine 0.5 milliLiter(s) IntraMuscular once  insulin glargine Injectable (LANTUS) 36 Unit(s) SubCutaneous at bedtime  insulin lispro (ADMELOG) corrective regimen sliding scale   SubCutaneous three times a day before meals  insulin lispro Injectable (ADMELOG) 6 Unit(s) SubCutaneous three times a day before meals  levothyroxine 137 MICROGram(s) Oral daily  nicotine - 21 mG/24Hr(s) Patch 1 Patch Transdermal daily      PHYSICAL EXAM:   Vital Signs Last 24 Hrs  T(C): 36.9 (05 Nov 2024 07:46), Max: 38.4 (04 Nov 2024 22:28)  T(F): 98.5 (05 Nov 2024 07:46), Max: 101.2 (04 Nov 2024 22:28)  HR: 73 (05 Nov 2024 07:46) (68 - 90)  BP: 112/59 (05 Nov 2024 07:46) (98/63 - 134/76)  BP(mean): 77 (05 Nov 2024 05:47) (75 - 77)  RR: 18 (05 Nov 2024 07:46) (16 - 20)  SpO2: 96% (05 Nov 2024 07:46) (95% - 100%)    Parameters below as of 05 Nov 2024 07:46  Patient On (Oxygen Delivery Method): room air    General: No acute distress.   HEENT: Head normocephalic, atraumatic. Conjunctivae clear w/o exudates or hemorrhage. Sclera non-icteric. Nares are patent bilaterally.   Neck: Supple, no adenopathy. Trachea midline. No JVD.  Cardiac: Normal rate and rhythm. S1, S2 auscultated. No murmurs, gallops, or rubs.     Respiratory: Lung sounds clear in all fields. Chest wall symmetric, nontender.   Abdominal: Soft, nondistended, nontender. Bowel sounds normoactive x 4 quadrants.   Skin: Skin is warm, dry and intact without rashes or lesions.   Extremities: No edema, no calf tenderness      Detailed Neurologic Exam:    Mental status: The patient is awake and alert and has normal attention span.  The patient is fully oriented in 3 spheres. The patient is oriented to current events. The patient is able to name objects, follow commands, repeat sentences.    Cranial nerves: Pupils equal and react symmetrically to light. There is no visual field deficit to confrontation. Extraocular motion is full with no nystagmus. There is no ptosis. Facial sensation is intact. Slight R facial asymmetry noted. Palate elevates symmetrically. Tongue is midline.    Motor: There is normal bulk and tone.  There is no tremor.  Strength is 5/5 in the right arm and leg.   Strength is 5/5 in the left arm and leg.    Sensation: Intact to light touch in 4 extremities    Cerebellar: There is no dysmetria on finger to nose testing.    LABS:                        12.8   11.16 )-----------( 310      ( 04 Nov 2024 23:14 )             35.6    11-04    134[L]  |  98  |  9.7  ----------------------------<  172[H]  3.5   |  23.0  |  0.61    Ca    8.6      04 Nov 2024 09:59    TPro  6.3[L]  /  Alb  3.3  /  TBili  0.4  /  DBili  x   /  AST  17  /  ALT  15  /  AlkPhos  80  11-04  PT/INR - ( 04 Nov 2024 09:59 )   PT: 11.6 sec;   INR: 1.03 ratio         PTT - ( 04 Nov 2024 09:59 )  PTT:27.7 sec      A1C: 8.9  LDL: pending    RADIOLOGY & ADDITIONAL STUDIES (independently reviewed unless otherwise noted):   CT Brain w/o IV Cont, CTA Head/Neck, CT Perufsion w/ IV Cont, Stroke Protocol (11.04.24 @ 10:28)   IMPRESSION:  HEAD CT: No evidence of an acute intracranial hemorrhage midline shiftor   hydrocephalus.  NECK CTA: No hemodynamic significant narrowing within the neck.  BRAIN CTA: No proximal large vessel occlusion.  CT PERFUSION: No areas of ischemia identified.    CT Cervical Spine No Cont (11.03.24 @ 15:28)   IMPRESSION:  Cervical spine CT: No acute fractures or dislocations.    Mild degenerative disc disease at C6-C7.    Head CT: No acute intracranial hemorrhage, mass effect, or shift of the   midline structures.    MR Head No Cont (11.04.24 @ 20:09)  IMPRESSION:  1)  Unremarkable MR study of the brain. No ischemic changes or   demyelinating foci noted. No hemorrhagic lesion or mass identified.  2)  clear sinuses and mastoids..    Cardiology   TTE W or WO Ultrasound Enhancing Agent (11.04.24 @ 15:37)   CONCLUSIONS:   1. Left ventricular systolic function is normal with an ejection fraction of 59 % by Schneider's method of disks. There are no regional wall motion abnormalities seen.   2. Normal left ventricular diastolic function.   3. Normal right ventricular cavity size and normal right ventricular systolic function.   4. No significant valvular disease.   5. Agitated saline injection was negative for intracardiac shunt.   6. Estimated pulmonary artery systolic pressure is 35 mmHg.   7. No prior echocardiogram is available for comparison.    Ultrasound  US Duplex Venous Lower Ext Complete, Bilateral (11.04.24 @ 13:56)   IMPRESSION:  No evidence of deep venous thrombosis in either lower extremity.    XRAY  Xray Chest 1 View- PORTABLE-Urgent (11.04.24 @ 10:40)  IMPRESSION:   No radiographic evidence of active chest disease..

## 2024-11-05 NOTE — OCCUPATIONAL THERAPY INITIAL EVALUATION ADULT - LIVES WITH, PROFILE
Pt lives in a house with spouse who can assist but who works. 1 MARY no handrail, 13 steps inside with handrail to bed/bath/spouse

## 2024-11-05 NOTE — DISCHARGE NOTE PROVIDER - CARE PROVIDER_API CALL
Valeria Ruvalcaba  Neurology  [address] [place] [address]  Phone: [phone]  Fax: [phone]  Follow Up Time: 1 month    Your, Primary Care Provider  Phone: (   )    -  Fax: (   )    -  Follow Up Time:     Roman Yip  Cardiovascular Disease  [address] [place] [address]  Phone: [phone]  Fax: [phone]  Follow Up Time: 2 weeks   Valeria Ruvalcaba  Neurology  [address] [place] [address]  Phone: [phone]  Fax: [phone]  Follow Up Time: 1 month    Roman Yip  Cardiovascular Disease  [address] [place] [address]  Phone: [phone]  Fax: [phone]  Follow Up Time: 2 weeks    Your, Primary Care Provider  Phone: (   )    -  Fax: (   )    -  Follow Up Time:     heme/onc,   Phone: (   )    -  Fax: (   )    -  Follow Up Time: 1 week

## 2024-11-05 NOTE — PROGRESS NOTE ADULT - SUBJECTIVE AND OBJECTIVE BOX
INTERVAL EVENTS:  Follow up diabetes management. Glucoses mildly elevated, denies acute complaints.     MEDICATIONS  (STANDING):  aspirin enteric coated 81 milliGRAM(s) Oral daily  atorvastatin 80 milliGRAM(s) Oral at bedtime  clopidogrel Tablet 75 milliGRAM(s) Oral daily  dextrose 5%. 1000 milliLiter(s) (50 mL/Hr) IV Continuous <Continuous>  dextrose 5%. 1000 milliLiter(s) (100 mL/Hr) IV Continuous <Continuous>  dextrose 50% Injectable 12.5 Gram(s) IV Push once  dextrose 50% Injectable 25 Gram(s) IV Push once  dextrose 50% Injectable 25 Gram(s) IV Push once  enoxaparin Injectable 40 milliGRAM(s) SubCutaneous every 24 hours  glucagon  Injectable 1 milliGRAM(s) IntraMuscular once  influenza   Vaccine 0.5 milliLiter(s) IntraMuscular once  insulin glargine Injectable (LANTUS) 36 Unit(s) SubCutaneous at bedtime  insulin lispro (ADMELOG) corrective regimen sliding scale   SubCutaneous three times a day before meals  insulin lispro Injectable (ADMELOG) 8 Unit(s) SubCutaneous three times a day before meals  levothyroxine 137 MICROGram(s) Oral daily  nicotine - 21 mG/24Hr(s) Patch 1 Patch Transdermal daily    MEDICATIONS  (PRN):  dextrose Oral Gel 15 Gram(s) Oral once PRN Blood Glucose LESS THAN 70 milliGRAM(s)/deciliter    Allergies  No Known Allergies    Vital Signs Last 24 Hrs  T(C): 37.3 (05 Nov 2024 11:05), Max: 38.4 (04 Nov 2024 22:28)  T(F): 99.2 (05 Nov 2024 11:05), Max: 101.2 (04 Nov 2024 22:28)  HR: 79 (05 Nov 2024 11:05) (68 - 90)  BP: 118/69 (05 Nov 2024 11:05) (98/63 - 134/76)  BP(mean): 77 (05 Nov 2024 05:47) (75 - 77)  RR: 18 (05 Nov 2024 11:05) (16 - 20)  SpO2: 95% (05 Nov 2024 11:05) (95% - 100%)    Parameters below as of 05 Nov 2024 11:05  Patient On (Oxygen Delivery Method): room air    PHYSICAL EXAM:  General: No apparent distress  Respiratory: Lungs clear bilaterally  Cardiac: +S1, S2, no m/r/g  GI: +BS, soft, non tender, non distended  Extremities: No peripheral edema  Neuro: A+O X3    LABS:                        12.8   11.16 )-----------( 310      ( 04 Nov 2024 23:14 )             35.6     11-04    134[L]  |  98  |  9.7  ----------------------------<  172[H]  3.5   |  23.0  |  0.61    Ca    8.6      04 Nov 2024 09:59    TPro  6.3[L]  /  Alb  3.3  /  TBili  0.4  /  DBili  x   /  AST  17  /  ALT  15  /  AlkPhos  80  11-04    Urinalysis Basic - ( 05 Nov 2024 08:30 )    Color: Yellow / Appearance: Clear / SG: >1.030 / pH: x  Gluc: x / Ketone: 40 mg/dL  / Bili: Negative / Urobili: 1.0 mg/dL   Blood: x / Protein: Trace mg/dL / Nitrite: Negative   Leuk Esterase: Negative / RBC: x / WBC x   Sq Epi: x / Non Sq Epi: x / Bacteria: x    POCT Blood Glucose.: 212 mg/dL (11-05-24 @ 12:21)  POCT Blood Glucose.: 205 mg/dL (11-05-24 @ 07:47)  POCT Blood Glucose.: 204 mg/dL (11-04-24 @ 22:25)  POCT Blood Glucose.: 133 mg/dL (11-04-24 @ 20:59)

## 2024-11-05 NOTE — DISCHARGE NOTE PROVIDER - CARE PROVIDERS DIRECT ADDRESSES
,[email or web address],[email or web address],DirectAddress_Unknown

## 2024-11-05 NOTE — DISCHARGE NOTE PROVIDER - NSDCMRMEDTOKEN_GEN_ALL_CORE_FT
Admelog SoloStar 100 units/mL injectable solution: 6 injectable 3 times a day (before meals) 6-8 units TID before meals  hydroCHLOROthiazide 25 mg oral tablet: 1 tab(s) orally once a day  levothyroxine 137 mcg (0.137 mg) oral tablet: 1 tab(s) orally once a day  losartan 50 mg oral tablet: 1 tab(s) orally once a day  Tresiba FlexTouch 100 units/mL subcutaneous solution: 36 unit(s) subcutaneous once a day (at bedtime)  Vascepa 1 g oral capsule: 2 cap(s) orally 2 times a day  Zetia 10 mg oral tablet: 1 tab(s) orally once a day   ezetimibe 10 mg oral tablet: 1 tab(s) orally once a day  hydroCHLOROthiazide 25 mg oral tablet: 1 tab(s) orally once a day  icosapent 1 g oral capsule: 2 cap(s) orally 2 times a day  insulin degludec 100 units/mL subcutaneous solution: 36 international unit(s) subcutaneous once a day (at bedtime)  insulin lispro 100 units/mL subcutaneous solution: 6-8units subcutaneous 3 times a day before meals via sliding scale  levothyroxine 137 mcg (0.137 mg) oral tablet: 1 tab(s) orally once a day  losartan 50 mg oral tablet: 1 tab(s) orally once a day   aspirin 81 mg oral delayed release tablet: 1 tab(s) orally once a day  atorvastatin 40 mg oral tablet: 1 tab(s) orally once a day  clopidogrel 75 mg oral tablet: 1 tab(s) orally once a day  ezetimibe 10 mg oral tablet: 1 tab(s) orally once a day  hydroCHLOROthiazide 25 mg oral tablet: 1 tab(s) orally once a day  icosapent 1 g oral capsule: 2 cap(s) orally 2 times a day  insulin glargine 100 units/mL subcutaneous solution: 22 unit(s) subcutaneous once a day (at bedtime)  insulin lispro 100 units/mL subcutaneous solution: 6-8units subcutaneous 3 times a day before meals via sliding scale  levothyroxine 137 mcg (0.137 mg) oral tablet: 1 tab(s) orally once a day  losartan 50 mg oral tablet: 1 tab(s) orally once a day   aspirin 81 mg oral delayed release tablet: 1 tab(s) orally once a day  atorvastatin 80 mg oral tablet: 1 tab(s) orally once a day (at bedtime)  clopidogrel 75 mg oral tablet: 1 tab(s) orally once a day  ezetimibe 10 mg oral tablet: 1 tab(s) orally once a day  hydroCHLOROthiazide 25 mg oral tablet: 1 tab(s) orally once a day  icosapent 1 g oral capsule: 2 cap(s) orally 2 times a day  insulin glargine 100 units/mL subcutaneous solution: 22 unit(s) subcutaneous once a day (at bedtime)  insulin lispro 100 units/mL subcutaneous solution: 6-8units subcutaneous 3 times a day before meals via sliding scale  levothyroxine 137 mcg (0.137 mg) oral tablet: 1 tab(s) orally once a day  losartan 50 mg oral tablet: 1 tab(s) orally once a day

## 2024-11-05 NOTE — DISCHARGE NOTE PROVIDER - PROVIDER TOKENS
PROVIDER:[TOKEN:[[id number]],FOLLOWUP:[1 month]],FREE:[LAST:[Your],FIRST:[Primary Care Provider],PHONE:[(   )    -],FAX:[(   )    -]],PROVIDER:[TOKEN:[[id number]],FOLLOWUP:[2 weeks]] PROVIDER:[TOKEN:[[id number]],FOLLOWUP:[1 month]],PROVIDER:[TOKEN:[[id number]],FOLLOWUP:[2 weeks]],FREE:[LAST:[Your],FIRST:[Primary Care Provider],PHONE:[(   )    -],FAX:[(   )    -]],FREE:[LAST:[heme/onc],PHONE:[(   )    -],FAX:[(   )    -],FOLLOWUP:[1 week]]

## 2024-11-05 NOTE — OCCUPATIONAL THERAPY INITIAL EVALUATION ADULT - ADDITIONAL COMMENTS
Pt has a shower with doors and grab bar. Pt owns no DME. Pt is R handed and drives. Pt family is supportive, daughter lives approx 30 min away

## 2024-11-05 NOTE — PROGRESS NOTE ADULT - ASSESSMENT
59F with PMHx HTN, HLD, T1DM, hypothyroidism, current tobacco use (~1ppd for 20 years) presented to [facility] ED 11/4/24 for complaint of slurred speech and right sided weakness. Admitted to stroke service for further work up.    Has h/o DM type 1, since 24 years, a1c 8.9% on admission, at home on tresiba 36 units daily and admelog 6 to 8 units tid with meals, took last dose of tresiba , last night.    Consulted for diabetes management  Home regimen: Tresiba 36u, admelog 6-8u TID  Current a1c: 8.9%  Outpatient Endocrinologist: Dr Benz    1. Poorly controlled DM1  - Increase admelog to 8 units TID  - Continue lantus 36 units qhs  - Correction scale  - Has f/u appt with our office 12/3    2. Hypothyroidism  - Check TFTs  - Continue home dose of  levothyroxine 137 mcg daily     3. R/o Stroke  - Neuro checks   - C/w ASA and plavix, statin

## 2024-11-05 NOTE — DISCHARGE NOTE PROVIDER - HOSPITAL COURSE
59 year old female, PMHx HTN, HLD, T1DM, hypothyroidism, current tobacco use (~1ppd for 20 years) presented to [facility] ED 11/4/24 for complaint of slurred speech and right sided weakness. Last known well was 2AM that morning when patient went to sleep; she awoke at 6AM complaining of right upper and lower extremity weakness, as well as word-finding difficulty. Symptoms resolved after about 20 minutes, however recurred again at 8AM and then gradually resolved. Brought in by [place] mobile stroke unit, imaging obtained prior to arrival reportedly negative for any acute abnormalities. Asymptomatic upon ED arrival. CT head, CTA head/neck repeated here, no evidence of hemorrhage or acute infarct, no large vessel occlusion or significant stenosis.     Impression: Suspect stuttering TIA as etiology of symptoms. Unremarkable MR study of the brain. No ischemic changes or demyelinating foci noted. No hemorrhagic lesion or mass identified.    HOSPITAL COURSE NOTABLE FOR:  -Neurologically w/ slight Right facial asymmetry, otherwise no focal neurologic deficits   -s/p Plavix load 300mg, continue ASA 81mg daily and Plavix 75mg daily   -Started high intensity statin  -Mild hyponatremia, IV fluid bolus given  -A1C=8.9, Endocrine was consulted, Admelog to 8 units TID, Lantus 36 units qhs, outpatient follow up 12/3/24.  -Counseled on smoking cessation     DISPOSITION: Home with no needs per PT and OT evaluations.      CORE MEASURES:        Admission NIHSS: 1     Tenecteplase : [] YES [x] NO      LDL/HDL/A1C: pending +++++++++ / 8.9     Depression Screen - if depression hx and/or present      Statin Therapy: Atorvastatin 80mg daily      Dysphagia Screen: [x] PASS [] FAIL     Smoking [x] YES [] NO      Afib [] YES [x] NO     Stroke Education [X] YES [] NO     59 year old female, PMHx HTN, HLD, T1DM, hypothyroidism, current tobacco use (~1ppd for 20 years) presented to [facility] ED 11/4/24 for complaint of slurred speech and right sided weakness. Last known well was 2AM that morning when patient went to sleep; she awoke at 6AM complaining of right upper and lower extremity weakness, as well as word-finding difficulty. Symptoms resolved after about 20 minutes, however recurred again at 8AM and then gradually resolved. Brought in by [place] mobile stroke unit, imaging obtained prior to arrival reportedly negative for any acute abnormalities. Asymptomatic upon ED arrival. CT head, CTA head/neck repeated here, no evidence of hemorrhage or acute infarct, no large vessel occlusion or significant stenosis.     Impression: Suspect stuttering TIA as etiology of symptoms. Unremarkable MR study of the brain. No ischemic changes or demyelinating foci noted. No hemorrhagic lesion or mass identified.    Patient also found to be intermittently spiking fevers.     HOSPITAL COURSE NOTABLE FOR:  -Neurologically w/ slight Right facial asymmetry, otherwise no focal neurologic deficits   -s/p Plavix load 300mg, continue ASA 81mg daily and Plavix 75mg daily   -Started high intensity statin  -Mild hyponatremia, IV fluid bolus given  -A1C=8.9, Endocrine was consulted, Admelog to 8 units TID, Lantus 36 units qhs, outpatient follow up 12/3/24.  -Counseled on smoking cessation     DISPOSITION: Home with no needs per PT and OT evaluations.      CORE MEASURES:        Admission NIHSS: 1     Tenecteplase : [] YES [x] NO      LDL/HDL/A1C: 59/ 22 / 8.9     Depression Screen - if depression hx and/or present      Statin Therapy: Atorvastatin 80mg daily      Dysphagia Screen: [x] PASS [] FAIL     Smoking [x] YES [] NO      Afib [] YES [x] NO     Stroke Education [X] YES [] NO     59 year old female, PMHx HTN, HLD, T1DM, hypothyroidism, current tobacco use (~1ppd for 20 years) presented to [facility] ED 11/4/24 for complaint of slurred speech and right sided weakness. Last known well was 2AM that morning when patient went to sleep; she awoke at 6AM complaining of right upper and lower extremity weakness, as well as word-finding difficulty. Symptoms resolved after about 20 minutes, however recurred again at 8AM and then gradually resolved. Brought in by [place] mobile stroke unit, imaging obtained prior to arrival reportedly negative for any acute abnormalities. Asymptomatic upon ED arrival. CT head, CTA head/neck repeated here, no evidence of hemorrhage or acute infarct, no large vessel occlusion or significant stenosis.     Impression: Suspect stuttering TIA as etiology of symptoms. Unremarkable MR study of the brain. No ischemic changes or demyelinating foci noted. No hemorrhagic lesion or mass identified.    Patient also found to be intermittently spiking fevers. All infectious work up negative. CT demonstrated Nonspecific mediastinal and pelvic adenopathy. RF negative. Heme/onc consulted. Currently pending RUTH antibiotics, patient can follow up outpatient.    HOSPITAL COURSE NOTABLE FOR:  -Neurologically w/ slight Right facial asymmetry, otherwise no focal neurologic deficits   -s/p Plavix load 300mg, continue ASA 81mg daily and Plavix 75mg daily   -Started high intensity statin  -Mild hyponatremia, IV fluid bolus given  -A1C=8.9, Endocrine was consulted, Admelog to 8 units TID, Lantus 22 units qhs, outpatient follow up 12/3/24.  -Counseled on smoking cessation  -Needs follow up with heme/onc upon d/c    DISPOSITION: Home with no needs per PT and OT evaluations.      CORE MEASURES:        Admission NIHSS: 1     Tenecteplase : [] YES [x] NO      LDL/HDL/A1C: 59/ 22 / 8.9     Depression Screen - if depression hx and/or present      Statin Therapy: Atorvastatin 80mg daily      Dysphagia Screen: [x] PASS [] FAIL     Smoking [x] YES [] NO      Afib [] YES [x] NO     Stroke Education [X] YES [] NO

## 2024-11-05 NOTE — OCCUPATIONAL THERAPY INITIAL EVALUATION ADULT - GENERAL OBSERVATIONS, REHAB EVAL
Left pt as received semifowler in bed, NAD, +IV lock, +Tele on RA A&Ox4 with daughter bedside. Pre/post pain 15/10, head pressure; RN aware. Pt agreeable to OT evaluation

## 2024-11-05 NOTE — DISCHARGE NOTE PROVIDER - NSDCFUSCHEDAPPT_GEN_ALL_CORE_FT
Margarito Benz  [facility]  ENDOCRIN [address]  Scheduled Appointment: 12/03/2024     Stefania Wong  [facility]  ENDOCRIN [address]  Scheduled Appointment: 12/30/2024     Roman Yip  [facility]  CARDIOLOGY [address]  Scheduled Appointment: 11/19/2024    Valeria Ruvalcaba  [facility]  NEUROLOGY [address]  Scheduled Appointment: 12/27/2024    Stefania Wong  [facility]  ENDOCRIN [address]  Scheduled Appointment: 12/30/2024

## 2024-11-05 NOTE — PHYSICAL THERAPY INITIAL EVALUATION ADULT - PERTINENT HX OF CURRENT PROBLEM, REHAB EVAL
as per medical chart: presented to [facility] ED for complaint of slurred speech and right sided weakness

## 2024-11-05 NOTE — DISCHARGE NOTE PROVIDER - NSDCFUADDAPPT_GEN_ALL_CORE_FT
APPTS ARE READY TO BE MADE: [X] YES    Best Family or Patient Contact (if needed):    Additional Information about above appointments (if needed):    1: Dr. Ruvalcaba-neurology- 1 month for f/u TIA  2: Dr. Yip- cardiology  3: heme/onc- one week    Other comments or requests:  APPTS ARE READY TO BE MADE: [X] YES    Best Family or Patient Contact (if needed):    Additional Information about above appointments (if needed):    1: Dr. Ruvalcaba-neurology- 1 month for f/u TIA  2: Dr. Yip- cardiology  3: heme/onc- one week    Other comments or requests:     Patient was outreached but did not answer. A voicemail was left for the patient to return our call. PCP, Hem/Onc    Prior to outreaching the patient, it was visible that the patient has secured a follow up appointment which was not scheduled by our team. Valeria Nuñez on 12/27 at 3pm and Roman Kaur on 11/19 at 9am APPTS ARE READY TO BE MADE: [X] YES    Best Family or Patient Contact (if needed):    Additional Information about above appointments (if needed):    1: Dr. Ruvalcaba-neurology- 1 month for f/u TIA  2: Dr. Yip- cardiology  3: heme/onc- one week    Other comments or requests:     Patient was outreached but did not answer. A voicemail was left for the patient to return our call. PCP, Hem/Onc    Prior to outreaching the patient, it was visible that the patient has secured a follow up appointment which was not scheduled by our team. Valeria Nuñez on 12/27 at 3pm and Roman Kaur on 11/19 at 9am    11/18- Patient returned phone call, stated daughter made all appointments. also  will discuss HEMATOLOY visit with her Cardiologist, Endocrinologist.

## 2024-11-05 NOTE — PROGRESS NOTE ADULT - ASSESSMENT
59 year old female, PMHx HTN, HLD, T1DM, hypothyroidism, current tobacco use (~1ppd for 20 years) presented to [facility] ED 11/4/24 for complaint of slurred speech and right sided weakness. Last known well was 2AM that morning when patient went to sleep; she awoke at 6AM complaining of right upper and lower extremity weakness, as well as word-finding difficulty. Symptoms resolved after about 20 minutes, however recurred again at 8AM and then gradually resolved. Brought in by [place] mobile stroke unit, imaging obtained prior to arrival reportedly negative for any acute abnormalities. Asymptomatic upon ED arrival. CT head, CTA head/neck repeated here, no evidence of hemorrhage or acute infarct, no large vessel occlusion or significant stenosis.     Impression: Suspect stuttering TIA as etiology of symptoms. Unremarkable MR study of the brain. No ischemic changes or demyelinating foci noted. No hemorrhagic lesion or mass identified.    NEURO: Suspected stuttering lacunar stroke  -Neurologically w/ slight R facial asymmetry, otherwise no focal neurologic deficits   -Continue close monitoring for neurologic deterioration    - Stroke neuro checks q2hrs    -SBP goal 120-180mmHg, avoiding rapid fluctuations or hypotension    -ANTITHROMBOTIC THERAPY: s/p Plavix load 300mg, c/w 81mg ASA and plavix 75mg daily   -titrate statin to LDL goal less than 70; start high intensity statin, obtain lipid panel, titrate statin dosing based on results   -MRI Brain w/o pending   -Dysphagia screen: pass  -Physical therapy/OT/Speech eval/treatment.       CARDIOVASCULAR:   -check TTE  -Obtain baseline ECG   -cardiac monitoring w/ telemetry for now, further evaluation pending findings of noted workup                             HEMATOLOGY:  - H/H 13.4/37.6, Platelets 287  -patient should have all age and risk appropriate malignancy screenings with PCP or sooner if clinically suspected   -Obtain LE duplex to r/o DVT as potential embolic source of stroke      DVT ppx: LMWH     PULMONARY:   -Baseline CXR ordered  - protecting airway, saturating well     RENAL:   -BUN/Cr 9.7/0.61, monitor urine output, maintain adequate hydration    -Mild hyponatremia, IV fluid bolus given; trend lytes, replenish prn   -Na Goal:  135-145    ID:   -afebrile, no leukocytosis  -monitor for si/sx of infection     ENDOCRINE:  #T1DM, Hypothyroidism  -A1C=8.9, insulin sliding scale and lantus ordered; endocrine consult pending  -Thyroid panel pending, c/w home dose of levothyroxine, titrate as needed     Psych: Tobacco abuse  - Counseled on smoking cessation     OTHER:    -condition and plan of care d/w patient, questions and concerns addressed.     DISPOSITION: Rehab or home depending on PT eval once stable and workup is complete      CORE MEASURES:        Admission NIHSS: 1     Tenecteplase : [] YES [x] NO      LDL/HDL/A1C: Lipid panel pending/8.9     Depression Screen- if depression hx and/or present      Statin Therapy: Atorvastatin 80mg daily      Dysphagia Screen: [x] PASS [] FAIL     Smoking [x] YES [] NO      Afib [] YES [x] NO     Stroke Education [] YES [] NO [x] pending  59 year old female, PMHx HTN, HLD, T1DM, hypothyroidism, current tobacco use (~1ppd for 20 years) presented to [facility] ED 11/4/24 for complaint of slurred speech and right sided weakness. Last known well was 2AM that morning when patient went to sleep; she awoke at 6AM complaining of right upper and lower extremity weakness, as well as word-finding difficulty. Symptoms resolved after about 20 minutes, however recurred again at 8AM and then gradually resolved. Brought in by [place] mobile stroke unit, imaging obtained prior to arrival reportedly negative for any acute abnormalities. Asymptomatic upon ED arrival. CT head, CTA head/neck repeated here, no evidence of hemorrhage or acute infarct, no large vessel occlusion or significant stenosis.     Impression: Suspect stuttering TIA as etiology of symptoms. Unremarkable MR study of the brain. No ischemic changes or demyelinating foci noted. No hemorrhagic lesion or mass identified.    NEURO: Suspected stuttering lacunar stroke  -Neurologically w/ slight R facial asymmetry, otherwise no focal neurologic deficits   -Continue close monitoring for neurologic deterioration    - Stroke neuro checks q4hrs    -SBP goal 120-180mmHg, avoiding rapid fluctuations or hypotension    -ANTITHROMBOTIC THERAPY: s/p Plavix load 300mg, c/w 81mg ASA and plavix 75mg daily   -titrate statin to LDL goal less than 70; continue 80mg statin, lipid panel pending, titrate statin dosing based on results   -MRI Brain w/o as above  -Dysphagia screen: pass  -Physical therapy/OT/Speech eval/treatment.       CARDIOVASCULAR:   -TTE noted as above   -Obtain baseline ECG   -cardiac monitoring w/ telemetry for now, further evaluation pending findings of noted workup                             HEMATOLOGY:  - H/H 12.8/35.6, Platelets 310  -patient should have all age and risk appropriate malignancy screenings with PCP or sooner if clinically suspected   -Obtain LE duplex to r/o DVT as potential embolic source of stroke      DVT ppx: LMWH     PULMONARY:   -Baseline CXR ordered  - protecting airway, saturating well     RENAL:   -BUN/Cr 9.7/0.61, monitor urine output, maintain adequate hydration    -Mild hyponatremia, IV fluid bolus given; trend lytes, replenish prn   -Na Goal:  135-145    ID:   -11/4/24 into 11/5/24 TMAX 101.2 , WBC 11.16  -monitor for si/sx of infection     ENDOCRINE:  #T1DM, Hypothyroidism  -A1C=8.9, insulin sliding scale and lantus ordered; endocrine consult pending  -Thyroid panel pending, c/w home dose of levothyroxine, titrate as needed     Psych: Tobacco abuse  - Counseled on smoking cessation   -Stating she does not want nicotine patch     OTHER:    -condition and plan of care d/w patient, questions and concerns addressed.     DISPOSITION: Rehab or home depending on PT eval once stable and workup is complete      CORE MEASURES:        Admission NIHSS: 1     Tenecteplase : [] YES [x] NO      LDL/HDL/A1C: Lipid panel pending/8.9     Depression Screen- if depression hx and/or present      Statin Therapy: Atorvastatin 80mg daily      Dysphagia Screen: [x] PASS [] FAIL     Smoking [x] YES [] NO      Afib [] YES [x] NO     Stroke Education [] YES [] NO [x] pending  59 year old female, PMHx HTN, HLD, T1DM, hypothyroidism, current tobacco use (~1ppd for 20 years) presented to [facility] ED 11/4/24 for complaint of slurred speech and right sided weakness. Last known well was 2AM that morning when patient went to sleep; she awoke at 6AM complaining of right upper and lower extremity weakness, as well as word-finding difficulty. Symptoms resolved after about 20 minutes, however recurred again at 8AM and then gradually resolved. Brought in by [place] mobile stroke unit, imaging obtained prior to arrival reportedly negative for any acute abnormalities. Asymptomatic upon ED arrival. CT head, CTA head/neck repeated here, no evidence of hemorrhage or acute infarct, no large vessel occlusion or significant stenosis. MRI without infarction.    Impression: Suspect stuttering TIA as etiology of symptoms, possibly small vessel disease.     NEURO:   -Neurologically w/ slight Right lip depression , otherwise no focal neurologic deficits   -Continue close monitoring for neurologic deterioration    - Stroke neuro checks q4hrs    -SBP goal of normotension    -ANTITHROMBOTIC THERAPY: s/p Plavix load 300mg, c/w 81mg ASA and plavix 75mg daily for anticipated 21 days followed by ASA 81mg monotherapy there after.   -titrate statin to LDL goal less than 70; continue 80mg statin, lipid panel pending, titrate statin dosing based on results   -MRI Brain w/o as above  -Dysphagia screen: pass  -Physical therapy/OT/Speech eval/treatment.       CARDIOVASCULAR:   -TTE noted as above   - ECG  NSR  -cardiac monitoring w/ telemetry for now, further evaluation pending findings of noted workup                             HEMATOLOGY:  - H/H 12.8/35.6, Platelets 310  -patient should have all age and risk appropriate malignancy screenings with PCP or sooner if clinically suspected   -Obtain LE duplex to r/o DVT as potential embolic source of stroke      DVT ppx: LMWH     PULMONARY:   -Baseline CXR without acute disease noted   -protecting airway, saturating well     RENAL:   -BUN/Cr 9.7/0.61, monitor urine output, maintain adequate hydration    -Mild hyponatremia, IV fluid bolus given; trend lytes, replenish prn   -Na Goal:  135-145    ID:   -11/4/24 into 11/5/24 TMAX 101.2 , WBC 11.16  -monitor for si/sx of infection   -infectious workup in progress  -UA without gross UTI, RVP negative     ENDOCRINE:  #T1DM, Hypothyroidism  -A1C=8.9, insulin sliding scale and lantus ordered; endocrine following  -Thyroid panel pending, c/w home dose of levothyroxine, titrate as needed     Psych: Tobacco use   -Counseled on smoking cessation , education and counselling performed.   -Stating she does not want nicotine patch     OTHER:    -condition and plan of care d/w patient, daughter at request, questions and concerns addressed.   -nutrition follow up   -aggressive risk factor control     DISPOSITION: Anticipate home once workup complete.       CORE MEASURES:        Admission NIHSS: 1     Tenecteplase : [] YES [x] NO      LDL/HDL/A1C: Lipid panel pending/8.9     Depression Screen- if depression hx and/or present      Statin Therapy: Atorvastatin 80mg daily      Dysphagia Screen: [x] PASS [] FAIL     Smoking [x] YES [] NO      Afib [] YES [x] NO     Stroke Education [] YES [] NO [x] pending  59 year old female, PMHx HTN, HLD, T1DM, hypothyroidism, current tobacco use (~1ppd for 20 years) presented to [facility] ED 11/4/24 for complaint of slurred speech and right sided weakness. Last known well was 2AM that morning when patient went to sleep; she awoke at 6AM complaining of right upper and lower extremity weakness, as well as word-finding difficulty. Symptoms resolved after about 20 minutes, however recurred again at 8AM and then gradually resolved. Brought in by [place] mobile stroke unit, imaging obtained prior to arrival reportedly negative for any acute abnormalities. Asymptomatic upon ED arrival. CT head, CTA head/neck repeated here, no evidence of hemorrhage or acute infarct, no large vessel occlusion or significant stenosis. MRI without infarction.    Impression: Suspect stuttering TIA as etiology of symptoms, possibly small vessel disease.     NEURO:   -Neurologically w/ slight Right lip depression , otherwise no focal neurologic deficits   -Continue close monitoring for neurologic deterioration    - Stroke neuro checks q4hrs    -SBP goal of normotension    -ANTITHROMBOTIC THERAPY: s/p Plavix load 300mg, c/w 81mg ASA and plavix 75mg daily for anticipated 21 days followed by ASA 81mg monotherapy there after.   -titrate statin to LDL goal less than 70; continue 80mg statin, lipid panel pending, titrate statin dosing based on results   -MRI Brain w/o as above  -Dysphagia screen: pass  -Physical therapy/OT/Speech eval/treatment.       CARDIOVASCULAR:   -TTE noted as above   - ECG  NSR  -cardiac monitoring w/ telemetry for now, further evaluation pending findings of noted workup                             HEMATOLOGY:  - H/H 12.8/35.6, Platelets 310  -patient should have all age and risk appropriate malignancy screenings with PCP or sooner if clinically suspected   -Obtain LE duplex to r/o DVT as potential embolic source of stroke      DVT ppx: LMWH     PULMONARY:   -Baseline CXR without acute disease noted   -protecting airway, saturating well     RENAL:   -BUN/Cr 9.7/0.61, monitor urine output, maintain adequate hydration    -Mild hyponatremia, IV fluid bolus given; trend lytes, replenish prn   -Na Goal:  135-145    ID:   -11/4/24 into 11/5/24 TMAX 101.2 , WBC 11.16  -monitor for si/sx of infection   -infectious workup in progress  -UA without gross UTI, RVP negative     ENDOCRINE:  #T1DM, Hypothyroidism  -A1C=8.9, insulin sliding scale and lantus ordered; endocrine following  -Thyroid panel pending, c/w home dose of levothyroxine, titrate as needed     Psych: Tobacco use   -Counseled on smoking cessation , education and counselling performed.   -States she will quit.      OTHER:    -condition and plan of care d/w patient, daughter at request, questions and concerns addressed.   -nutrition follow up   -aggressive risk factor control     DISPOSITION: Anticipate home once workup complete.       CORE MEASURES:        Admission NIHSS: 1     Tenecteplase : [] YES [x] NO      LDL/HDL/A1C: Lipid panel pending/8.9     Depression Screen- if depression hx and/or present      Statin Therapy: Atorvastatin 80mg daily      Dysphagia Screen: [x] PASS [] FAIL     Smoking [x] YES [] NO      Afib [] YES [x] NO     Stroke Education [] YES [] NO [x] pending

## 2024-11-05 NOTE — OCCUPATIONAL THERAPY INITIAL EVALUATION ADULT - DIAGNOSIS, OT EVAL
59 year old Female presents 11/4 with c/o slurred speech, R sided weakness and word finding difficulty. Pt with suspected stuttering TIA/lacunar stroke as etiology of symptoms. Unremarkable MR study of the brain. No ischemic changes or demyelinating foci noted. No hemorrhagic lesion or mass identified.

## 2024-11-05 NOTE — DISCHARGE NOTE PROVIDER - NSDCQMSTROKERISK_NEU_ALL_CORE
History of a stroke or TIA Diabetes/High blood pressure/High cholesterol/Tobacco use Diabetes/High blood pressure/High cholesterol/History of a stroke or TIA/Tobacco use

## 2024-11-05 NOTE — OCCUPATIONAL THERAPY INITIAL EVALUATION ADULT - PERTINENT HX OF CURRENT PROBLEM, REHAB EVAL
Pt brought in by [facility] stroke unit, imaging obtained prior to arrival reportedly (-) for any acute abnormalities. Asymptomatic upon ED arrival. CT head, CTA head/neck repeated here, no evidence of hemorrhage or acute infarct, no large vessel occlusion or significant stenosis.

## 2024-11-06 LAB
ANION GAP SERPL CALC-SCNC: 11 MMOL/L — SIGNIFICANT CHANGE UP (ref 5–17)
ANION GAP SERPL CALC-SCNC: 14 MMOL/L — SIGNIFICANT CHANGE UP (ref 5–17)
BUN SERPL-MCNC: 10.9 MG/DL — SIGNIFICANT CHANGE UP (ref 8–20)
BUN SERPL-MCNC: 11.3 MG/DL — SIGNIFICANT CHANGE UP (ref 8–20)
CALCIUM SERPL-MCNC: 8 MG/DL — LOW (ref 8.4–10.5)
CALCIUM SERPL-MCNC: 8.7 MG/DL — SIGNIFICANT CHANGE UP (ref 8.4–10.5)
CHLORIDE SERPL-SCNC: 100 MMOL/L — SIGNIFICANT CHANGE UP (ref 96–108)
CHLORIDE SERPL-SCNC: 97 MMOL/L — SIGNIFICANT CHANGE UP (ref 96–108)
CO2 SERPL-SCNC: 22 MMOL/L — SIGNIFICANT CHANGE UP (ref 22–29)
CO2 SERPL-SCNC: 23 MMOL/L — SIGNIFICANT CHANGE UP (ref 22–29)
CREAT SERPL-MCNC: 0.58 MG/DL — SIGNIFICANT CHANGE UP (ref 0.5–1.3)
CREAT SERPL-MCNC: 0.78 MG/DL — SIGNIFICANT CHANGE UP (ref 0.5–1.3)
CRP SERPL-MCNC: 97 MG/L — HIGH
EGFR: 104 ML/MIN/1.73M2 — SIGNIFICANT CHANGE UP
EGFR: 87 ML/MIN/1.73M2 — SIGNIFICANT CHANGE UP
ERYTHROCYTE [SEDIMENTATION RATE] IN BLOOD: 53 MM/HR — HIGH (ref 0–20)
GLUCOSE BLDC GLUCOMTR-MCNC: 148 MG/DL — HIGH (ref 70–99)
GLUCOSE BLDC GLUCOMTR-MCNC: 164 MG/DL — HIGH (ref 70–99)
GLUCOSE BLDC GLUCOMTR-MCNC: 213 MG/DL — HIGH (ref 70–99)
GLUCOSE BLDC GLUCOMTR-MCNC: 95 MG/DL — SIGNIFICANT CHANGE UP (ref 70–99)
GLUCOSE SERPL-MCNC: 145 MG/DL — HIGH (ref 70–99)
GLUCOSE SERPL-MCNC: 170 MG/DL — HIGH (ref 70–99)
HCT VFR BLD CALC: 33.8 % — LOW (ref 34.5–45)
HGB BLD-MCNC: 12.1 G/DL — SIGNIFICANT CHANGE UP (ref 11.5–15.5)
MAGNESIUM SERPL-MCNC: 1.9 MG/DL — SIGNIFICANT CHANGE UP (ref 1.6–2.6)
MAGNESIUM SERPL-MCNC: 2 MG/DL — SIGNIFICANT CHANGE UP (ref 1.8–2.6)
MCHC RBC-ENTMCNC: 30.9 PG — SIGNIFICANT CHANGE UP (ref 27–34)
MCHC RBC-ENTMCNC: 35.8 G/DL — SIGNIFICANT CHANGE UP (ref 32–36)
MCV RBC AUTO: 86.4 FL — SIGNIFICANT CHANGE UP (ref 80–100)
PHOSPHATE SERPL-MCNC: 2.2 MG/DL — LOW (ref 2.4–4.7)
PHOSPHATE SERPL-MCNC: 2.8 MG/DL — SIGNIFICANT CHANGE UP (ref 2.4–4.7)
PLATELET # BLD AUTO: 337 K/UL — SIGNIFICANT CHANGE UP (ref 150–400)
POTASSIUM SERPL-MCNC: 3.3 MMOL/L — LOW (ref 3.5–5.3)
POTASSIUM SERPL-MCNC: 3.6 MMOL/L — SIGNIFICANT CHANGE UP (ref 3.5–5.3)
POTASSIUM SERPL-SCNC: 3.3 MMOL/L — LOW (ref 3.5–5.3)
POTASSIUM SERPL-SCNC: 3.6 MMOL/L — SIGNIFICANT CHANGE UP (ref 3.5–5.3)
RBC # BLD: 3.91 M/UL — SIGNIFICANT CHANGE UP (ref 3.8–5.2)
RBC # FLD: 12 % — SIGNIFICANT CHANGE UP (ref 10.3–14.5)
SODIUM SERPL-SCNC: 133 MMOL/L — LOW (ref 135–145)
SODIUM SERPL-SCNC: 134 MMOL/L — LOW (ref 135–145)
T4 FREE SERPL-MCNC: 1.3 NG/DL — SIGNIFICANT CHANGE UP (ref 0.9–1.8)
TSH SERPL-MCNC: 0.76 UIU/ML — SIGNIFICANT CHANGE UP (ref 0.27–4.2)
WBC # BLD: 11.18 K/UL — HIGH (ref 3.8–10.5)
WBC # FLD AUTO: 11.18 K/UL — HIGH (ref 3.8–10.5)

## 2024-11-06 PROCEDURE — 95816 EEG AWAKE AND DROWSY: CPT | Mod: 26

## 2024-11-06 PROCEDURE — 99232 SBSQ HOSP IP/OBS MODERATE 35: CPT

## 2024-11-06 RX ORDER — DIBASIC SODIUM PHOSPHATE, MONOBASIC POTASSIUM PHOSPHATE AND MONOBASIC SODIUM PHOSPHATE 852; 155; 130 MG/1; MG/1; MG/1
1 TABLET ORAL ONCE
Refills: 0 | Status: COMPLETED | OUTPATIENT
Start: 2024-11-06 | End: 2024-11-06

## 2024-11-06 RX ORDER — TOBRAMYCIN 3 MG/ML
1 SOLUTION OPHTHALMIC THREE TIMES A DAY
Refills: 0 | Status: DISCONTINUED | OUTPATIENT
Start: 2024-11-06 | End: 2024-11-06

## 2024-11-06 RX ORDER — INSULIN GLARGINE,HUM.REC.ANLOG 100/ML
30 VIAL (ML) SUBCUTANEOUS AT BEDTIME
Refills: 0 | Status: DISCONTINUED | OUTPATIENT
Start: 2024-11-06 | End: 2024-11-07

## 2024-11-06 RX ORDER — ACETAMINOPHEN 500 MG
650 TABLET ORAL ONCE
Refills: 0 | Status: COMPLETED | OUTPATIENT
Start: 2024-11-06 | End: 2024-11-06

## 2024-11-06 RX ORDER — SODIUM CHLORIDE 9 MG/ML
1000 INJECTION, SOLUTION INTRAMUSCULAR; INTRAVENOUS; SUBCUTANEOUS
Refills: 0 | Status: DISCONTINUED | OUTPATIENT
Start: 2024-11-06 | End: 2024-11-07

## 2024-11-06 RX ORDER — POTASSIUM CHLORIDE 10 MEQ
40 TABLET, EXTENDED RELEASE ORAL ONCE
Refills: 0 | Status: DISCONTINUED | OUTPATIENT
Start: 2024-11-06 | End: 2024-11-06

## 2024-11-06 RX ORDER — ERYTHROMYCIN 5 MG/G
1 OINTMENT OPHTHALMIC THREE TIMES A DAY
Refills: 0 | Status: DISCONTINUED | OUTPATIENT
Start: 2024-11-06 | End: 2024-11-10

## 2024-11-06 RX ORDER — ACETAMINOPHEN 500 MG
1000 TABLET ORAL ONCE
Refills: 0 | Status: COMPLETED | OUTPATIENT
Start: 2024-11-06 | End: 2024-11-06

## 2024-11-06 RX ADMIN — SODIUM CHLORIDE 75 MILLILITER(S): 9 INJECTION, SOLUTION INTRAMUSCULAR; INTRAVENOUS; SUBCUTANEOUS at 08:50

## 2024-11-06 RX ADMIN — CLOPIDOGREL 75 MILLIGRAM(S): 75 TABLET ORAL at 08:49

## 2024-11-06 RX ADMIN — Medication 40 MILLIGRAM(S): at 08:49

## 2024-11-06 RX ADMIN — Medication 80 MILLIGRAM(S): at 22:00

## 2024-11-06 RX ADMIN — Medication 650 MILLIGRAM(S): at 18:38

## 2024-11-06 RX ADMIN — Medication 1: at 12:47

## 2024-11-06 RX ADMIN — Medication 400 MILLIGRAM(S): at 09:01

## 2024-11-06 RX ADMIN — METHOCARBAMOL 750 MILLIGRAM(S): 500 TABLET ORAL at 12:59

## 2024-11-06 RX ADMIN — ERYTHROMYCIN 1 APPLICATION(S): 5 OINTMENT OPHTHALMIC at 17:45

## 2024-11-06 RX ADMIN — Medication 8 UNIT(S): at 08:48

## 2024-11-06 RX ADMIN — Medication 81 MILLIGRAM(S): at 08:49

## 2024-11-06 RX ADMIN — Medication 8 UNIT(S): at 12:46

## 2024-11-06 RX ADMIN — Medication 1000 MILLIGRAM(S): at 10:01

## 2024-11-06 RX ADMIN — ERYTHROMYCIN 1 APPLICATION(S): 5 OINTMENT OPHTHALMIC at 22:00

## 2024-11-06 RX ADMIN — DIBASIC SODIUM PHOSPHATE, MONOBASIC POTASSIUM PHOSPHATE AND MONOBASIC SODIUM PHOSPHATE 1 PACKET(S): 852; 155; 130 TABLET ORAL at 08:49

## 2024-11-06 RX ADMIN — Medication 400 MILLIGRAM(S): at 01:28

## 2024-11-06 RX ADMIN — Medication 30 UNIT(S): at 22:00

## 2024-11-06 RX ADMIN — Medication 137 MICROGRAM(S): at 05:50

## 2024-11-06 RX ADMIN — Medication 8 UNIT(S): at 17:42

## 2024-11-06 NOTE — PROGRESS NOTE ADULT - ASSESSMENT
59F with PMHx HTN, HLD, T1DM, hypothyroidism, current tobacco use (~1ppd for 20 years) presented to [facility] ED 11/4/24 for complaint of slurred speech and right sided weakness. Admitted to stroke service for further work up.    Has h/o DM type 1, since 25 years, a1c 8.9% on admission, at home on tresiba 36 units daily and admelog 6 to 8 units tid with meals, took last dose of tresiba , last night.    Consulted for diabetes management  Home regimen: Tresiba 36u, admelog 6-8u TID  Current a1c: 8.9%  Outpatient Endocrinologist: Dr Benz    1. Poorly controlled DM1  - Continue admelog 8 units TID  - Continue lantus 36 units qhs  - Correction scale  - Follow up appt: 12/30 @ 9AM with ROSALINDA Aguiar ([address] [facility])  - Pt wanted to switch providers, scheduled an appt with Dr Valadez for 4/2/25    2. Hypothyroidism  - TSH 0.76, FT4 1.3  - Continue home dose of  levothyroxine 137 mcg daily     3. TIA, rule out stroke  - Neuro checks   - C/w ASA and plavix, statin   59F with PMHx HTN, HLD, T1DM, hypothyroidism, current tobacco use (~1ppd for 20 years) presented to [facility] ED 11/4/24 for complaint of slurred speech and right sided weakness. Admitted to stroke service for further work up.    Has h/o DM type 1, since 25 years, a1c 8.9% on admission, at home on tresiba 36 units daily and admelog 6 to 8 units tid with meals, took last dose of tresiba , last night.    Consulted for diabetes management  Home regimen: Tresiba 36u, admelog 6-8u TID  Current a1c: 8.9%  Outpatient Endocrinologist: Dr Benz    1. Poorly controlled DM1  - FS 95 this AM, decrease lantus to 30 units qhs  - Continue admelog 8 units TID  - Correction scale  - Follow up appt: 12/30 @ 9AM with ROSALINDA Aguiar ([address] [facility])  - Pt wanted to switch providers, scheduled an appt with Dr Valadez for 4/2/25    2. Hypothyroidism  - TSH 0.76, FT4 1.3  - Continue home dose of  levothyroxine 137 mcg daily     3. TIA, rule out stroke  - Neuro checks   - C/w ASA and plavix, statin

## 2024-11-06 NOTE — PROGRESS NOTE ADULT - SUBJECTIVE AND OBJECTIVE BOX
INTERVAL EVENTS:  Follow up diabetes management. Glucoses elevated yesterday, improved this morning to 95. Pt c/o headache.     MEDICATIONS  (STANDING):  aspirin enteric coated 81 milliGRAM(s) Oral daily  atorvastatin 80 milliGRAM(s) Oral at bedtime  clopidogrel Tablet 75 milliGRAM(s) Oral daily  dextrose 5%. 1000 milliLiter(s) (100 mL/Hr) IV Continuous <Continuous>  dextrose 5%. 1000 milliLiter(s) (50 mL/Hr) IV Continuous <Continuous>  dextrose 50% Injectable 25 Gram(s) IV Push once  dextrose 50% Injectable 25 Gram(s) IV Push once  dextrose 50% Injectable 12.5 Gram(s) IV Push once  enoxaparin Injectable 40 milliGRAM(s) SubCutaneous every 24 hours  glucagon  Injectable 1 milliGRAM(s) IntraMuscular once  influenza   Vaccine 0.5 milliLiter(s) IntraMuscular once  insulin glargine Injectable (LANTUS) 36 Unit(s) SubCutaneous at bedtime  insulin lispro (ADMELOG) corrective regimen sliding scale   SubCutaneous three times a day before meals  insulin lispro Injectable (ADMELOG) 8 Unit(s) SubCutaneous three times a day before meals  levothyroxine 137 MICROGram(s) Oral daily  methocarbamol 750 milliGRAM(s) Oral daily  nicotine - 21 mG/24Hr(s) Patch 1 Patch Transdermal daily  sodium chloride 0.9%. 1000 milliLiter(s) (75 mL/Hr) IV Continuous <Continuous>    MEDICATIONS  (PRN):  dextrose Oral Gel 15 Gram(s) Oral once PRN Blood Glucose LESS THAN 70 milliGRAM(s)/deciliter    Allergies  No Known Allergies    Vital Signs Last 24 Hrs  T(C): 37.2 (06 Nov 2024 04:51), Max: 38.7 (05 Nov 2024 23:24)  T(F): 98.9 (06 Nov 2024 04:51), Max: 101.6 (05 Nov 2024 23:24)  HR: 72 (06 Nov 2024 04:51) (72 - 84)  BP: 95/57 (06 Nov 2024 04:51) (95/57 - 143/85)  BP(mean): --  RR: 18 (06 Nov 2024 04:51) (18 - 18)  SpO2: 93% (06 Nov 2024 04:51) (93% - 96%)    Parameters below as of 06 Nov 2024 04:51  Patient On (Oxygen Delivery Method): room air    PHYSICAL EXAM:  General: No apparent distress  Respiratory: Lungs clear bilaterally  Cardiac: +S1, S2, no m/r/g  GI: +BS, soft, non tender, non distended  Extremities: No peripheral edema  Neuro: A+O X3    LABS:                        12.1   11.18 )-----------( 337      ( 06 Nov 2024 04:00 )             33.8     11-06    133[L]  |  100  |  10.9  ----------------------------<  170[H]  3.3[L]   |  22.0  |  0.58    Ca    8.0[L]      06 Nov 2024 04:00  Phos  2.2     11-06  Mg     1.9     11-06    TPro  5.8[L]  /  Alb  3.2[L]  /  TBili  0.3[L]  /  DBili  0.1  /  AST  14  /  ALT  11  /  AlkPhos  91  11-05    Urinalysis Basic - ( 06 Nov 2024 04:00 )    Color: x / Appearance: x / SG: x / pH: x  Gluc: 170 mg/dL / Ketone: x  / Bili: x / Urobili: x   Blood: x / Protein: x / Nitrite: x   Leuk Esterase: x / RBC: x / WBC x   Sq Epi: x / Non Sq Epi: x / Bacteria: x    POCT Blood Glucose.: 95 mg/dL (11-06-24 @ 08:36)  POCT Blood Glucose.: 279 mg/dL (11-05-24 @ 21:36)  POCT Blood Glucose.: 240 mg/dL (11-05-24 @ 16:14)  POCT Blood Glucose.: 212 mg/dL (11-05-24 @ 12:21)    Thyroid Stimulating Hormone, Serum: 0.76 uIU/mL (11-06-24 @ 04:00)  Free Thyroxine, Serum: 1.3 ng/dL (11-06-24 @ 04:00)  Triiodothyronine, Total (T3 Total): 51 ng/dL (11-05-24 @ 13:00)  Thyroid Stimulating Hormone, Serum: 0.46 uIU/mL (11-05-24 @ 13:00)

## 2024-11-06 NOTE — EEG REPORT - NS EEG TEXT BOX
VINCENZO DAWSON [id number]     Study Date: 11-06-24  Duration: 21 min  --------------------------------------------------------------------------------------------------  History:  CC/ HPI Patient is a 59y old  Female who presents with a chief complaint of Concern for stroke (06 Nov 2024 10:44)    MEDICATIONS  (STANDING):  aspirin enteric coated 81 milliGRAM(s) Oral daily  atorvastatin 80 milliGRAM(s) Oral at bedtime  clopidogrel Tablet 75 milliGRAM(s) Oral daily  dextrose 5%. 1000 milliLiter(s) (50 mL/Hr) IV Continuous <Continuous>  dextrose 5%. 1000 milliLiter(s) (100 mL/Hr) IV Continuous <Continuous>  dextrose 50% Injectable 12.5 Gram(s) IV Push once  dextrose 50% Injectable 25 Gram(s) IV Push once  dextrose 50% Injectable 25 Gram(s) IV Push once  enoxaparin Injectable 40 milliGRAM(s) SubCutaneous every 24 hours  erythromycin   Ointment 1 Application(s) Left EYE three times a day  glucagon  Injectable 1 milliGRAM(s) IntraMuscular once  influenza   Vaccine 0.5 milliLiter(s) IntraMuscular once  insulin glargine Injectable (LANTUS) 30 Unit(s) SubCutaneous at bedtime  insulin lispro (ADMELOG) corrective regimen sliding scale   SubCutaneous three times a day before meals  insulin lispro Injectable (ADMELOG) 8 Unit(s) SubCutaneous three times a day before meals  levothyroxine 137 MICROGram(s) Oral daily  methocarbamol 750 milliGRAM(s) Oral daily  nicotine - 21 mG/24Hr(s) Patch 1 Patch Transdermal daily  sodium chloride 0.9%. 1000 milliLiter(s) (75 mL/Hr) IV Continuous <Continuous>    --------------------------------------------------------------------------------------------------  Study Interpretation:    [[[Abbreviation Key:  PDR=alpha rhythm/posterior dominant rhythm. A-P=anterior posterior.  Amplitude: ‘very low’:<20; ‘low’:20-49; ‘medium’:; ‘high’:>150uV.  Persistence for periodic/rhythmic patterns (% of epoch) ‘rare’:<1%; ‘occasional’:1-10%; ‘frequent’:10-50%; ‘abundant’:50-90%; ‘continuous’:>90%.  Persistence for sporadic discharges: ‘rare’:<1/hr; ‘occasional’:1/min-1/hr; ‘frequent’:>1/min; ‘abundant’:>1/10 sec.  RPP=rhythmic and periodic patterns; GRDA=generalized rhythmic delta activity; FIRDA=frontal intermittent GRDA; LRDA=lateralized rhythmic delta activity; TIRDA=temporal intermittent rhythmic delta activity;  LPD=PLED=lateralized periodic discharges; GPD=generalized periodic discharges; BIPDs =bilateral independent periodic discharges; Mf=multifocal; SIRPDs=stimulus induced rhythmic, periodic, or ictal appearing discharges; BIRDs=brief potentially ictal rhythmic discharges >4 Hz, lasting .5-10s; PFA (paroxysmal bursts >13 Hz or =8 Hz <10s).  Modifiers: +F=with fast component; +S=with spike component; +R=with rhythmic component.  S-B=burst suppression pattern.  Max=maximal. N1-drowsy; N2-stage II sleep; N3-slow wave sleep. SSS/BETS=small sharp spikes/benign epileptiform transients of sleep. HV=hyperventilation; PS=photic stimulation]]]    Daily EEG Visual Analysis    FINDINGS:      Background:  Continuity: Continuous  Symmetry: Symmetric  Posterior dominant rhythm (PDR): 10 Hz, reactive to eye closure. Symmetric low-amplitude frontal beta in wakefulness.  Voltage: Normal  Anterior-Posterior Gradient: Present  Other background findings: Diffuse low-amplitude beta activity  Breach: Absent    Background Slowing:  Generalized slowing: None  Focal slowing: None    State Changes:   Drowsiness and stage 2 sleep are not captured.    Interictal Findings:  None    Electrographic and Electroclinical seizures:  None    Other Clinical Events:  None    Activation Procedures:   Hyperventilation is performed and does not elicit any abnormalities.  Photic stimulation is performed and does not elicit any abnormalities.      Artifacts:  Intermittent myogenic and movement artifacts are present.    Single-lead EKG: Regular rhythm    EEG Classification / Summary:  Normal routine EEG in the awake state.  No focal or epileptiform abnormalities are captured.   No seizures.    Clinical Impression:  A normal routine EEG neither refutes nor supports a diagnosis of epilepsy.   No epileptiform abnormalities or seizures captured.          -------------------------------------------------------------------------------------------------------    Cyn Deshpande MD  Attending Physician, [facility] [place]    -------------------------------------------------------------------------------------------------------    To reach EEG technologist:  Please use the pager number for the appropriate hospital or contact the .  At [facility] - Pager #: [phone]    To reach EEG-reading physician:  EEG Reading Room Phone #: [phone]  Epilepsy Answering Service after 5PM and before 8:30AM: Phone #: [phone]

## 2024-11-06 NOTE — PROGRESS NOTE ADULT - SUBJECTIVE AND OBJECTIVE BOX
Preliminary note, official recommendations pending attending review/signature   Seen and examined by Stroke team attending/team, assessment/ plan as discussed with stroke team attending/team as noted.     [facility] Stroke Team  Progress Note     HPI:  59 year old female, PMHx HTN, HLD, T1DM, hypothyroidism, current tobacco use (~1ppd for 20 years) presented to [facility] ED 11/4/24 for complaint of slurred speech and right sided weakness. Last known well was 2AM that morning when patient went to sleep; she awoke at 6AM complaining of right upper and lower extremity weakness, as well as word-finding difficulty. Symptoms resolved after about 20 minutes, however recurred again at 8AM and then gradually resolved. Brought in by [place] mobile stroke unit, imaging obtained prior to arrival reportedly negative for any acute abnormalities. Asymptomatic upon ED arrival. CT head, CTA head/neck repeated here, no evidence of hemorrhage or acute infarct, no large vessel occlusion or significant stenosis. Patient currently complaining of headache and neck pain (notes this issue has been ongoing for ~2months, recent hx of epidural steroid injections). Denies any visual changes, chest pain, shortness of breath, abdominal pain. Admitted to stroke service for further work up.     SUBJECTIVE: Febrile overnight.  No new neurologic complaints.  ROS reported negative unless otherwise noted.    aspirin enteric coated 81 milliGRAM(s) Oral daily  atorvastatin 80 milliGRAM(s) Oral at bedtime  clopidogrel Tablet 75 milliGRAM(s) Oral daily  dextrose 5%. 1000 milliLiter(s) IV Continuous <Continuous>  dextrose 5%. 1000 milliLiter(s) IV Continuous <Continuous>  dextrose 50% Injectable 25 Gram(s) IV Push once  dextrose 50% Injectable 12.5 Gram(s) IV Push once  dextrose 50% Injectable 25 Gram(s) IV Push once  dextrose Oral Gel 15 Gram(s) Oral once PRN  enoxaparin Injectable 40 milliGRAM(s) SubCutaneous every 24 hours  glucagon  Injectable 1 milliGRAM(s) IntraMuscular once  influenza   Vaccine 0.5 milliLiter(s) IntraMuscular once  insulin glargine Injectable (LANTUS) 36 Unit(s) SubCutaneous at bedtime  insulin lispro (ADMELOG) corrective regimen sliding scale   SubCutaneous three times a day before meals  insulin lispro Injectable (ADMELOG) 8 Unit(s) SubCutaneous three times a day before meals  levothyroxine 137 MICROGram(s) Oral daily  methocarbamol 750 milliGRAM(s) Oral daily  nicotine - 21 mG/24Hr(s) Patch 1 Patch Transdermal daily  sodium chloride 0.9%. 1000 milliLiter(s) IV Continuous <Continuous>      PHYSICAL EXAM:   Vital Signs Last 24 Hrs  T(C): 37.2 (06 Nov 2024 04:51), Max: 38.7 (05 Nov 2024 23:24)  T(F): 98.9 (06 Nov 2024 04:51), Max: 101.6 (05 Nov 2024 23:24)  HR: 72 (06 Nov 2024 04:51) (72 - 84)  BP: 95/57 (06 Nov 2024 04:51) (95/57 - 143/85)  BP(mean): --  RR: 18 (06 Nov 2024 04:51) (18 - 18)  SpO2: 93% (06 Nov 2024 04:51) (93% - 96%)    Parameters below as of 06 Nov 2024 04:51  Patient On (Oxygen Delivery Method): room air      General: No acute distress.   HEENT: Head normocephalic, atraumatic. Conjunctivae clear w/o exudates or hemorrhage, left superior lid edema with erythema and mild crusting,. Sclera non-icteric. Nares are patent bilaterally.   Cardiac: Normal rate and rhythm. S1, S2 auscultated. No murmurs, gallops, or rubs.     Respiratory: Lung sounds clear in all fields. Chest wall symmetric, nontender.   Abdominal: Soft, nondistended, nontender. Bowel sounds normoactive x 4 quadrants.   Skin: Skin is warm, dry and intact without rashes or lesions.   Extremities: No edema, no calf tenderness      Detailed Neurologic Exam:    Mental status: The patient is awake and alert and has normal attention span.  The patient is fully oriented in 3 spheres. The patient is oriented to current events. The patient is able to name objects, follow commands, repeat sentences.    Cranial nerves: Pupils equal and react symmetrically to light. There is no visual field deficit to confrontation. Extraocular motion is full with no nystagmus. There is no ptosis. Facial sensation is intact. Slight R facial asymmetry noted spouse reports baseline). Palate elevates symmetrically. Tongue is midline.    Motor: There is normal bulk and tone.  There is no tremor.  Strength is 5/5 in the right arm and leg.   Strength is 5/5 in the left arm and leg.    Sensation: Intact to light touch in 4 extremities    Cerebellar: There is no dysmetria on finger to nose testing.      LABS:                        12.1   11.18 )-----------( 337      ( 06 Nov 2024 04:00 )             33.8    11-06    133[L]  |  100  |  10.9  ----------------------------<  170[H]  3.3[L]   |  22.0  |  0.58    Ca    8.0[L]      06 Nov 2024 04:00  Phos  2.2     11-06  Mg     1.9     11-06    TPro  5.8[L]  /  Alb  3.2[L]  /  TBili  0.3[L]  /  DBili  0.1  /  AST  14  /  ALT  11  /  AlkPhos  91  11-05      A1C: 8.9  LDL: 59    RADIOLOGY & ADDITIONAL STUDIES (independently reviewed unless otherwise noted):  (11.04.24 @ 10:28)   IMPRESSION:  HEAD CT: No evidence of an acute intracranial hemorrhage midline shiftor   hydrocephalus.  NECK CTA: No hemodynamic significant narrowing within the neck.  BRAIN CTA: No proximal large vessel occlusion.  CT PERFUSION: No areas of ischemia identified.    CT Cervical Spine No Cont (11.03.24 @ 15:28)   IMPRESSION:  Cervical spine CT: No acute fractures or dislocations.  Mild degenerative disc disease at C6-C7.    Head CT: No acute intracranial hemorrhage, mass effect, or shift of the   midline structures.    MR Head No Cont (11.04.24 @ 20:09)  IMPRESSION:  1)  Unremarkable MR study of the brain. No ischemic changes or   demyelinating foci noted. No hemorrhagic lesion or mass identified.  2)  clear sinuses and mastoids..    Cardiology   TTE W or WO Ultrasound Enhancing Agent (11.04.24 @ 15:37)   CONCLUSIONS:   1. Left ventricular systolic function is normal with an ejection fraction of 59 % by Schneider's method of disks. There are no regional wall motion abnormalities seen.   2. Normal left ventricular diastolic function.   3. Normal right ventricular cavity size and normal right ventricular systolic function.   4. No significant valvular disease.   5. Agitated saline injection was negative for intracardiac shunt.   6. Estimated pulmonary artery systolic pressure is 35 mmHg.   7. No prior echocardiogram is available for comparison.    Ultrasound  US Duplex Venous Lower Ext Complete, Bilateral (11.04.24 @ 13:56)   IMPRESSION:  No evidence of deep venous thrombosis in either lower extremity.    XRAY  Xray Chest 1 View- PORTABLE-Urgent (11.04.24 @ 10:40)  IMPRESSION:   No radiographic evidence of active chest disease..             [facility] Stroke Team  Progress Note     HPI:  59 year old female, PMHx HTN, HLD, T1DM, hypothyroidism, current tobacco use (~1ppd for 20 years) presented to [facility] ED 11/4/24 for complaint of slurred speech and right sided weakness. Last known well was 2AM that morning when patient went to sleep; she awoke at 6AM complaining of right upper and lower extremity weakness, as well as word-finding difficulty. Symptoms resolved after about 20 minutes, however recurred again at 8AM and then gradually resolved. Brought in by [facility] stroke unit, imaging obtained prior to arrival reportedly negative for any acute abnormalities. Asymptomatic upon ED arrival. CT head, CTA head/neck repeated here, no evidence of hemorrhage or acute infarct, no large vessel occlusion or significant stenosis. Patient currently complaining of headache and neck pain (notes this issue has been ongoing for ~2months, recent hx of epidural steroid injections). Denies any visual changes, chest pain, shortness of breath, abdominal pain. Admitted to stroke service for further work up.     SUBJECTIVE: Febrile overnight.  No new neurologic complaints.  ROS reported negative unless otherwise noted.    aspirin enteric coated 81 milliGRAM(s) Oral daily  atorvastatin 80 milliGRAM(s) Oral at bedtime  clopidogrel Tablet 75 milliGRAM(s) Oral daily  dextrose 5%. 1000 milliLiter(s) IV Continuous <Continuous>  dextrose 5%. 1000 milliLiter(s) IV Continuous <Continuous>  dextrose 50% Injectable 25 Gram(s) IV Push once  dextrose 50% Injectable 12.5 Gram(s) IV Push once  dextrose 50% Injectable 25 Gram(s) IV Push once  dextrose Oral Gel 15 Gram(s) Oral once PRN  enoxaparin Injectable 40 milliGRAM(s) SubCutaneous every 24 hours  glucagon  Injectable 1 milliGRAM(s) IntraMuscular once  influenza   Vaccine 0.5 milliLiter(s) IntraMuscular once  insulin glargine Injectable (LANTUS) 36 Unit(s) SubCutaneous at bedtime  insulin lispro (ADMELOG) corrective regimen sliding scale   SubCutaneous three times a day before meals  insulin lispro Injectable (ADMELOG) 8 Unit(s) SubCutaneous three times a day before meals  levothyroxine 137 MICROGram(s) Oral daily  methocarbamol 750 milliGRAM(s) Oral daily  nicotine - 21 mG/24Hr(s) Patch 1 Patch Transdermal daily  sodium chloride 0.9%. 1000 milliLiter(s) IV Continuous <Continuous>      PHYSICAL EXAM:   Vital Signs Last 24 Hrs  T(C): 37.2 (06 Nov 2024 04:51), Max: 38.7 (05 Nov 2024 23:24)  T(F): 98.9 (06 Nov 2024 04:51), Max: 101.6 (05 Nov 2024 23:24)  HR: 72 (06 Nov 2024 04:51) (72 - 84)  BP: 95/57 (06 Nov 2024 04:51) (95/57 - 143/85)  BP(mean): --  RR: 18 (06 Nov 2024 04:51) (18 - 18)  SpO2: 93% (06 Nov 2024 04:51) (93% - 96%)    Parameters below as of 06 Nov 2024 04:51  Patient On (Oxygen Delivery Method): room air      General: No acute distress.   HEENT: Head normocephalic, atraumatic. Conjunctivae clear w/o exudates or hemorrhage, left superior lid edema with erythema and mild crusting,. Sclera non-icteric. Nares are patent bilaterally.   Cardiac: Normal rate and rhythm. S1, S2 auscultated. No murmurs, gallops, or rubs.     Respiratory: Lung sounds clear in all fields. Chest wall symmetric, nontender.   Abdominal: Soft, nondistended, nontender. Bowel sounds normoactive x 4 quadrants.   Skin: Skin is warm, dry and intact without rashes or lesions.   Extremities: No edema, no calf tenderness      Detailed Neurologic Exam:    Mental status: The patient is awake and alert and has normal attention span.  The patient is fully oriented in 3 spheres. The patient is oriented to current events. The patient is able to name objects, follow commands, repeat sentences.    Cranial nerves: Pupils equal and react symmetrically to light. There is no visual field deficit to confrontation. Extraocular motion is full with no nystagmus. There is no ptosis. Facial sensation is intact. Slight R facial asymmetry noted spouse reports baseline). Palate elevates symmetrically. Tongue is midline.    Motor: There is normal bulk and tone.  There is no tremor.  Strength is 5/5 in the right arm and leg.   Strength is 5/5 in the left arm and leg.    Sensation: Intact to light touch in 4 extremities    Cerebellar: There is no dysmetria on finger to nose testing.      LABS:                        12.1   11.18 )-----------( 337      ( 06 Nov 2024 04:00 )             33.8    11-06    133[L]  |  100  |  10.9  ----------------------------<  170[H]  3.3[L]   |  22.0  |  0.58    Ca    8.0[L]      06 Nov 2024 04:00  Phos  2.2     11-06  Mg     1.9     11-06    TPro  5.8[L]  /  Alb  3.2[L]  /  TBili  0.3[L]  /  DBili  0.1  /  AST  14  /  ALT  11  /  AlkPhos  91  11-05      A1C: 8.9  LDL: 59    RADIOLOGY & ADDITIONAL STUDIES (independently reviewed unless otherwise noted):  (11.04.24 @ 10:28)   IMPRESSION:  HEAD CT: No evidence of an acute intracranial hemorrhage midline shiftor   hydrocephalus.  NECK CTA: No hemodynamic significant narrowing within the neck.  BRAIN CTA: No proximal large vessel occlusion.  CT PERFUSION: No areas of ischemia identified.    CT Cervical Spine No Cont (11.03.24 @ 15:28)   IMPRESSION:  Cervical spine CT: No acute fractures or dislocations.  Mild degenerative disc disease at C6-C7.    Head CT: No acute intracranial hemorrhage, mass effect, or shift of the   midline structures.    MR Head No Cont (11.04.24 @ 20:09)  IMPRESSION:  1)  Unremarkable MR study of the brain. No ischemic changes or   demyelinating foci noted. No hemorrhagic lesion or mass identified.  2)  clear sinuses and mastoids..    Cardiology   TTE W or WO Ultrasound Enhancing Agent (11.04.24 @ 15:37)   CONCLUSIONS:   1. Left ventricular systolic function is normal with an ejection fraction of 59 % by Schneider's method of disks. There are no regional wall motion abnormalities seen.   2. Normal left ventricular diastolic function.   3. Normal right ventricular cavity size and normal right ventricular systolic function.   4. No significant valvular disease.   5. Agitated saline injection was negative for intracardiac shunt.   6. Estimated pulmonary artery systolic pressure is 35 mmHg.   7. No prior echocardiogram is available for comparison.    Ultrasound  US Duplex Venous Lower Ext Complete, Bilateral (11.04.24 @ 13:56)   IMPRESSION:  No evidence of deep venous thrombosis in either lower extremity.    XRAY  Xray Chest 1 View- PORTABLE-Urgent (11.04.24 @ 10:40)  IMPRESSION:   No radiographic evidence of active chest disease..           Prior course of illness, care, and medications given provided by and reviewed with the family.

## 2024-11-06 NOTE — PROGRESS NOTE ADULT - ASSESSMENT
59 year old female, PMHx HTN, HLD, T1DM, hypothyroidism, current tobacco use (~1ppd for 20 years) presented to [facility] ED 11/4/24 for complaint of slurred speech and right sided weakness. Last known well was 2AM that morning when patient went to sleep; she awoke at 6AM complaining of right upper and lower extremity weakness, as well as word-finding difficulty. Symptoms resolved after about 20 minutes, however recurred again at 8AM and then gradually resolved. Brought in by [place] mobile stroke unit, imaging obtained prior to arrival reportedly negative for any acute abnormalities. Asymptomatic upon ED arrival. CT head, CTA head/neck repeated here, no evidence of hemorrhage or acute infarct, no large vessel occlusion or significant stenosis. MRI without infarction.    Impression: Suspect stuttering TIA as etiology of symptoms, possibly small vessel disease. Intermittent fever of unknown origin.      NEURO:   -Neurologically w/o acute hemorrhage.   - MR brain w/wo , MR venogram evaluate for infectious, thrombotic source   -eeg/crp  -eeg spot  -Continue close monitoring for neurologic deterioration    - Stroke neuro checks q4hrs    -SBP goal of normotension    -ANTITHROMBOTIC THERAPY: s/p Plavix load 300mg (11/4/2024), c/w 81mg ASA and plavix 75mg daily for anticipated 21 days followed by ASA 81mg monotherapy there after.   -titrate statin to LDL goal less than 70; continue 80mg atorvastatin, monitor LFT and lipid profile, titrate accordingly   -MRI Brain w/o as above  -Dysphagia screen: passed, advance as tolerated per protocol  -Physical therapy/OT/Speech eval/treatment.       CARDIOVASCULAR:   -TTE noted as above   - ECG  NSR  -cardiac monitoring w/ telemetry for now, further evaluation pending findings of noted workup                             HEMATOLOGY:  - H/H without acute change, plt 337  -patient should have all age and risk appropriate malignancy screenings with PCP or sooner if clinically suspected   -LE duplex without evidence of DVT in either extremity   -CT CAP pending to r/o malignancy as well      DVT ppx: LMWH     PULMONARY:   - CXR without acute disease noted   -protecting airway, saturating well   -encourage mobility and incentive spirometry as tolerated     RENAL:   -BUN/Cr 9.7/0.61, monitor urine output, maintain adequate hydration    -Mild hyponatremia, IV fluid bolus given; trend lytes, hypokalemia- replete accordingly   -Na Goal:  135-145    ID:   -remains intermittently febrile   -abx ointment for opthalmologic infectious process w/ evolving conjunctivitis   -monitor for si/sx of infection   -infectious workup in progress  -UA without gross UTI, RVP negative   -CT CAP pending   -Blood cx NGTD, repeat in progress    ENDOCRINE:  #Poorly controlled DM1, Hypothyroidism  -A1C=8.9, admelog, lantus, sliding scale   -levothyroxine for hypothyroid     Psych: Tobacco use   -Counseled on smoking cessation , education and counselling performed.   -States she will quit.    -nicotine patch as needed     OTHER:    -condition and plan of care d/w patient, spouse at request, questions and concerns addressed.   -nutrition follow up   -aggressive risk factor control     DISPOSITION: Anticipate home once workup complete.       CORE MEASURES:        Admission NIHSS: 1     Tenecteplase : [] YES [x] NO      LDL/HDL/A1C: Lipid panel pending/8.9     Depression Screen- if depression hx and/or present      Statin Therapy: Atorvastatin 80mg daily      Dysphagia Screen: [x] PASS [] FAIL     Smoking [x] YES [] NO      Afib [] YES [x] NO     Stroke Education [] YES [] NO [x] pending  59 year old female, PMHx HTN, HLD, T1DM, hypothyroidism, current tobacco use (~1ppd for 20 years) presented to [facility] ED 11/4/24 for complaint of slurred speech and right sided weakness. Last known well was 2AM that morning when patient went to sleep; she awoke at 6AM complaining of right upper and lower extremity weakness, as well as word-finding difficulty. Symptoms resolved after about 20 minutes, however recurred again at 8AM and then gradually resolved. Brought in by [place] mobile stroke unit, imaging obtained prior to arrival reportedly negative for any acute abnormalities. Asymptomatic upon ED arrival. CT head, CTA head/neck repeated here, no evidence of hemorrhage or acute infarct, no large vessel occlusion or significant stenosis. MRI without infarction.    Impression: Suspect stuttering TIA as etiology of symptoms, possibly small vessel disease. Intermittent fever of unknown origin.  Headache better. Low suspicion of meningitis at this time.    NEURO:   -Neurologically w/o acute hemorrhage.   - MR brain w/wo , MR venogram evaluate for infectious, thrombotic source   -eeg/crp  -eeg spot  -Continue close monitoring for neurologic deterioration    - Stroke neuro checks q4hrs    -SBP goal of normotension    -ANTITHROMBOTIC THERAPY: s/p Plavix load 300mg (11/4/2024), c/w 81mg ASA and plavix 75mg daily for anticipated 21 days followed by ASA 81mg monotherapy there after.   -titrate statin to LDL goal less than 70; continue 80mg atorvastatin, monitor LFT and lipid profile, titrate accordingly   -MRI Brain w/o as above  -Dysphagia screen: passed, advance as tolerated per protocol  -Physical therapy/OT/Speech eval/treatment.       CARDIOVASCULAR:   -TTE noted as above   - ECG  NSR  -cardiac monitoring w/ telemetry for now, further evaluation pending findings of noted workup                             HEMATOLOGY:  - H/H without acute change, plt 337  -patient should have all age and risk appropriate malignancy screenings with PCP or sooner if clinically suspected   -LE duplex without evidence of DVT in either extremity   -CT CAP pending to r/o malignancy as well      DVT ppx: LMWH     PULMONARY:   - CXR without acute disease noted   -protecting airway, saturating well   -encourage mobility and incentive spirometry as tolerated     RENAL:   -BUN/Cr 9.7/0.61, monitor urine output, maintain adequate hydration    -Mild hyponatremia, IV fluid bolus given; trend lytes, hypokalemia- replete accordingly   -Na Goal:  135-145    ID:   -remains intermittently febrile   -abx ointment for opthalmologic infectious process w/ evolving conjunctivitis   -monitor for si/sx of infection   -infectious workup in progress  -UA without gross UTI, RVP negative   -CT CAP pending   -Blood cx NGTD, repeat in progress    ENDOCRINE:  #Poorly controlled DM1, Hypothyroidism  -A1C=8.9, admelog, lantus, sliding scale   -levothyroxine for hypothyroid     Psych: Tobacco use   -Counseled on smoking cessation , education and counselling performed.   -States she will quit.    -nicotine patch as needed     OTHER:    -condition and plan of care d/w patient, spouse at request, questions and concerns addressed.   -nutrition follow up   -aggressive risk factor control     DISPOSITION: Anticipate home once workup complete.       CORE MEASURES:        Admission NIHSS: 1     Tenecteplase : [] YES [x] NO      LDL/HDL/A1C: Lipid panel pending/8.9     Depression Screen- if depression hx and/or present      Statin Therapy: Atorvastatin 80mg daily      Dysphagia Screen: [x] PASS [] FAIL     Smoking [x] YES [] NO      Afib [] YES [x] NO     Stroke Education [] YES [] NO [x] pending

## 2024-11-07 LAB
ANION GAP SERPL CALC-SCNC: 10 MMOL/L — SIGNIFICANT CHANGE UP (ref 5–17)
APPEARANCE UR: CLEAR — SIGNIFICANT CHANGE UP
BILIRUB UR-MCNC: NEGATIVE — SIGNIFICANT CHANGE UP
BUN SERPL-MCNC: 8.9 MG/DL — SIGNIFICANT CHANGE UP (ref 8–20)
CALCIUM SERPL-MCNC: 8.1 MG/DL — LOW (ref 8.4–10.5)
CHLORIDE SERPL-SCNC: 102 MMOL/L — SIGNIFICANT CHANGE UP (ref 96–108)
CO2 SERPL-SCNC: 24 MMOL/L — SIGNIFICANT CHANGE UP (ref 22–29)
COLOR SPEC: YELLOW — SIGNIFICANT CHANGE UP
CREAT SERPL-MCNC: 0.64 MG/DL — SIGNIFICANT CHANGE UP (ref 0.5–1.3)
DIFF PNL FLD: NEGATIVE — SIGNIFICANT CHANGE UP
EGFR: 101 ML/MIN/1.73M2 — SIGNIFICANT CHANGE UP
GLUCOSE BLDC GLUCOMTR-MCNC: 162 MG/DL — HIGH (ref 70–99)
GLUCOSE BLDC GLUCOMTR-MCNC: 167 MG/DL — HIGH (ref 70–99)
GLUCOSE BLDC GLUCOMTR-MCNC: 225 MG/DL — HIGH (ref 70–99)
GLUCOSE BLDC GLUCOMTR-MCNC: 95 MG/DL — SIGNIFICANT CHANGE UP (ref 70–99)
GLUCOSE SERPL-MCNC: 158 MG/DL — HIGH (ref 70–99)
GLUCOSE UR QL: 100 MG/DL
HCT VFR BLD CALC: 33.7 % — LOW (ref 34.5–45)
HGB BLD-MCNC: 12.1 G/DL — SIGNIFICANT CHANGE UP (ref 11.5–15.5)
KETONES UR-MCNC: NEGATIVE MG/DL — SIGNIFICANT CHANGE UP
LEUKOCYTE ESTERASE UR-ACNC: NEGATIVE — SIGNIFICANT CHANGE UP
MAGNESIUM SERPL-MCNC: 2 MG/DL — SIGNIFICANT CHANGE UP (ref 1.6–2.6)
MCHC RBC-ENTMCNC: 31 PG — SIGNIFICANT CHANGE UP (ref 27–34)
MCHC RBC-ENTMCNC: 35.9 G/DL — SIGNIFICANT CHANGE UP (ref 32–36)
MCV RBC AUTO: 86.4 FL — SIGNIFICANT CHANGE UP (ref 80–100)
NITRITE UR-MCNC: NEGATIVE — SIGNIFICANT CHANGE UP
PH UR: 6.5 — SIGNIFICANT CHANGE UP (ref 5–8)
PHOSPHATE SERPL-MCNC: 2.6 MG/DL — SIGNIFICANT CHANGE UP (ref 2.4–4.7)
PLATELET # BLD AUTO: 367 K/UL — SIGNIFICANT CHANGE UP (ref 150–400)
POTASSIUM SERPL-MCNC: 3.6 MMOL/L — SIGNIFICANT CHANGE UP (ref 3.5–5.3)
POTASSIUM SERPL-SCNC: 3.6 MMOL/L — SIGNIFICANT CHANGE UP (ref 3.5–5.3)
PROT UR-MCNC: NEGATIVE MG/DL — SIGNIFICANT CHANGE UP
RAPID RVP RESULT: SIGNIFICANT CHANGE UP
RBC # BLD: 3.9 M/UL — SIGNIFICANT CHANGE UP (ref 3.8–5.2)
RBC # FLD: 12.2 % — SIGNIFICANT CHANGE UP (ref 10.3–14.5)
SARS-COV-2 RNA SPEC QL NAA+PROBE: SIGNIFICANT CHANGE UP
SODIUM SERPL-SCNC: 136 MMOL/L — SIGNIFICANT CHANGE UP (ref 135–145)
SP GR SPEC: 1.01 — SIGNIFICANT CHANGE UP (ref 1–1.03)
UROBILINOGEN FLD QL: 1 MG/DL — SIGNIFICANT CHANGE UP (ref 0.2–1)
WBC # BLD: 10.02 K/UL — SIGNIFICANT CHANGE UP (ref 3.8–10.5)
WBC # FLD AUTO: 10.02 K/UL — SIGNIFICANT CHANGE UP (ref 3.8–10.5)

## 2024-11-07 PROCEDURE — 99222 1ST HOSP IP/OBS MODERATE 55: CPT

## 2024-11-07 PROCEDURE — 81003 URINALYSIS AUTO W/O SCOPE: CPT

## 2024-11-07 PROCEDURE — 85610 PROTHROMBIN TIME: CPT

## 2024-11-07 PROCEDURE — 74177 CT ABD & PELVIS W/CONTRAST: CPT | Mod: MC

## 2024-11-07 PROCEDURE — 99233 SBSQ HOSP IP/OBS HIGH 50: CPT

## 2024-11-07 PROCEDURE — 74176 CT ABD & PELVIS W/O CONTRAST: CPT | Mod: 26,59

## 2024-11-07 PROCEDURE — 70450 CT HEAD/BRAIN W/O DYE: CPT | Mod: MC

## 2024-11-07 PROCEDURE — 74177 CT ABD & PELVIS W/CONTRAST: CPT | Mod: 26,MC

## 2024-11-07 PROCEDURE — 80053 COMPREHEN METABOLIC PANEL: CPT

## 2024-11-07 PROCEDURE — 85025 COMPLETE CBC W/AUTO DIFF WBC: CPT

## 2024-11-07 PROCEDURE — 71045 X-RAY EXAM CHEST 1 VIEW: CPT

## 2024-11-07 PROCEDURE — 96374 THER/PROPH/DIAG INJ IV PUSH: CPT

## 2024-11-07 PROCEDURE — 87040 BLOOD CULTURE FOR BACTERIA: CPT

## 2024-11-07 PROCEDURE — 83605 ASSAY OF LACTIC ACID: CPT

## 2024-11-07 PROCEDURE — 71260 CT THORAX DX C+: CPT | Mod: 26,MC

## 2024-11-07 PROCEDURE — 96375 TX/PRO/DX INJ NEW DRUG ADDON: CPT

## 2024-11-07 PROCEDURE — 99232 SBSQ HOSP IP/OBS MODERATE 35: CPT

## 2024-11-07 PROCEDURE — 85730 THROMBOPLASTIN TIME PARTIAL: CPT

## 2024-11-07 PROCEDURE — 36415 COLL VENOUS BLD VENIPUNCTURE: CPT

## 2024-11-07 PROCEDURE — 71260 CT THORAX DX C+: CPT | Mod: MC

## 2024-11-07 PROCEDURE — 99223 1ST HOSP IP/OBS HIGH 75: CPT

## 2024-11-07 PROCEDURE — 93005 ELECTROCARDIOGRAM TRACING: CPT

## 2024-11-07 PROCEDURE — 72125 CT NECK SPINE W/O DYE: CPT | Mod: MC

## 2024-11-07 PROCEDURE — 99285 EMERGENCY DEPT VISIT HI MDM: CPT | Mod: 25

## 2024-11-07 RX ORDER — HYDROCHLOROTHIAZIDE 50 MG
1 TABLET ORAL
Refills: 0 | DISCHARGE

## 2024-11-07 RX ORDER — LEVOTHYROXINE SODIUM 300 MCG
137 TABLET ORAL DAILY
Refills: 0 | Status: DISCONTINUED | OUTPATIENT
Start: 2024-11-07 | End: 2024-11-10

## 2024-11-07 RX ORDER — LOSARTAN POTASSIUM 100 MG/1
50 TABLET, FILM COATED ORAL DAILY
Refills: 0 | Status: DISCONTINUED | OUTPATIENT
Start: 2024-11-07 | End: 2024-11-10

## 2024-11-07 RX ORDER — ICOSAPENT ETHYL 1 G/1
2 CAPSULE, LIQUID FILLED ORAL
Refills: 0 | DISCHARGE

## 2024-11-07 RX ORDER — ACETAMINOPHEN 500 MG
650 TABLET ORAL ONCE
Refills: 0 | Status: COMPLETED | OUTPATIENT
Start: 2024-11-07 | End: 2024-11-07

## 2024-11-07 RX ORDER — ACETAMINOPHEN 500 MG
650 TABLET ORAL EVERY 6 HOURS
Refills: 0 | Status: DISCONTINUED | OUTPATIENT
Start: 2024-11-07 | End: 2024-11-10

## 2024-11-07 RX ORDER — LEVOTHYROXINE SODIUM 88 MCG
1 TABLET ORAL
Refills: 0 | DISCHARGE

## 2024-11-07 RX ORDER — LOSARTAN POTASSIUM 25 MG/1
1 TABLET ORAL
Refills: 0 | DISCHARGE

## 2024-11-07 RX ORDER — INSULIN LISPRO 100/ML
0 VIAL (ML) SUBCUTANEOUS
Refills: 0 | DISCHARGE

## 2024-11-07 RX ORDER — INSULIN GLARGINE,HUM.REC.ANLOG 100/ML
26 VIAL (ML) SUBCUTANEOUS AT BEDTIME
Refills: 0 | Status: DISCONTINUED | OUTPATIENT
Start: 2024-11-07 | End: 2024-11-08

## 2024-11-07 RX ORDER — EZETIMIBE 10 MG/1
1 TABLET ORAL
Refills: 0 | DISCHARGE

## 2024-11-07 RX ADMIN — Medication 8 UNIT(S): at 11:39

## 2024-11-07 RX ADMIN — Medication 81 MILLIGRAM(S): at 11:39

## 2024-11-07 RX ADMIN — Medication 137 MICROGRAM(S): at 06:37

## 2024-11-07 RX ADMIN — Medication 650 MILLIGRAM(S): at 20:11

## 2024-11-07 RX ADMIN — Medication 40 MILLIGRAM(S): at 11:39

## 2024-11-07 RX ADMIN — Medication 80 MILLIGRAM(S): at 22:00

## 2024-11-07 RX ADMIN — ERYTHROMYCIN 1 APPLICATION(S): 5 OINTMENT OPHTHALMIC at 07:28

## 2024-11-07 RX ADMIN — Medication 650 MILLIGRAM(S): at 06:37

## 2024-11-07 RX ADMIN — METHOCARBAMOL 750 MILLIGRAM(S): 500 TABLET ORAL at 11:39

## 2024-11-07 RX ADMIN — CLOPIDOGREL 75 MILLIGRAM(S): 75 TABLET ORAL at 11:39

## 2024-11-07 RX ADMIN — ERYTHROMYCIN 1 APPLICATION(S): 5 OINTMENT OPHTHALMIC at 23:48

## 2024-11-07 RX ADMIN — Medication 8 UNIT(S): at 17:05

## 2024-11-07 RX ADMIN — Medication 1: at 17:05

## 2024-11-07 RX ADMIN — Medication 26 UNIT(S): at 22:01

## 2024-11-07 RX ADMIN — Medication 1: at 11:39

## 2024-11-07 RX ADMIN — Medication 650 MILLIGRAM(S): at 21:10

## 2024-11-07 NOTE — PROGRESS NOTE ADULT - SUBJECTIVE AND OBJECTIVE BOX
INCOMPLETE NOTE ***********************************  Preliminary note, official recommendations pending attending review/signature   Seen and examined by Stroke team attending/team, assessment/ plan as discussed with stroke team attending/team as noted.     [facility] Stroke Team  Progress Note     HPI:  59 year old female, PMHx HTN, HLD, T1DM, hypothyroidism, current tobacco use (~1ppd for 20 years) presented to [facility] ED 11/4/24 for complaint of slurred speech and right sided weakness. Last known well was 2AM that morning when patient went to sleep; she awoke at 6AM complaining of right upper and lower extremity weakness, as well as word-finding difficulty. Symptoms resolved after about 20 minutes, however recurred again at 8AM and then gradually resolved. Brought in by [place] mobile stroke unit, imaging obtained prior to arrival reportedly negative for any acute abnormalities. Asymptomatic upon ED arrival. CT head, CTA head/neck repeated here, no evidence of hemorrhage or acute infarct, no large vessel occlusion or significant stenosis. Patient currently complaining of headache and neck pain (notes this issue has been ongoing for ~2months, recent hx of epidural steroid injections). Denies any visual changes, chest pain, shortness of breath, abdominal pain. Admitted to stroke service for further work up.     SUBJECTIVE: No events overnight.  No new neurologic complaints.  ROS reported negative unless otherwise noted.    aspirin enteric coated 81 milliGRAM(s) Oral daily  atorvastatin 80 milliGRAM(s) Oral at bedtime  clopidogrel Tablet 75 milliGRAM(s) Oral daily  dextrose 5%. 1000 milliLiter(s) IV Continuous <Continuous>  dextrose 5%. 1000 milliLiter(s) IV Continuous <Continuous>  dextrose 50% Injectable 12.5 Gram(s) IV Push once  dextrose 50% Injectable 25 Gram(s) IV Push once  dextrose 50% Injectable 25 Gram(s) IV Push once  dextrose Oral Gel 15 Gram(s) Oral once PRN  enoxaparin Injectable 40 milliGRAM(s) SubCutaneous every 24 hours  erythromycin   Ointment 1 Application(s) Left EYE three times a day  glucagon  Injectable 1 milliGRAM(s) IntraMuscular once  influenza   Vaccine 0.5 milliLiter(s) IntraMuscular once  insulin glargine Injectable (LANTUS) 30 Unit(s) SubCutaneous at bedtime  insulin lispro (ADMELOG) corrective regimen sliding scale   SubCutaneous three times a day before meals  insulin lispro Injectable (ADMELOG) 8 Unit(s) SubCutaneous three times a day before meals  levothyroxine 137 MICROGram(s) Oral daily  methocarbamol 750 milliGRAM(s) Oral daily  nicotine - 21 mG/24Hr(s) Patch 1 Patch Transdermal daily  sodium chloride 0.9%. 1000 milliLiter(s) IV Continuous <Continuous>      PHYSICAL EXAM:   Vital Signs Last 24 Hrs  T(C): 38.2 (07 Nov 2024 04:30), Max: 38.2 (06 Nov 2024 19:17)  T(F): 100.7 (07 Nov 2024 04:30), Max: 100.7 (06 Nov 2024 19:17)  HR: 74 (07 Nov 2024 04:30) (67 - 87)  BP: 103/60 (07 Nov 2024 04:30) (103/60 - 115/68)  BP(mean): --  RR: 18 (07 Nov 2024 04:30) (18 - 18)  SpO2: 93% (07 Nov 2024 04:30) (93% - 98%)    Parameters below as of 07 Nov 2024 04:30  Patient On (Oxygen Delivery Method): room air      EXAM PENDING ***************************************    General: No acute distress.   HEENT: Head normocephalic, atraumatic. Conjunctivae clear w/o exudates or hemorrhage, left superior lid edema with erythema and mild crusting,. Sclera non-icteric. Nares are patent bilaterally.   Cardiac: Normal rate and rhythm. S1, S2 auscultated. No murmurs, gallops, or rubs.     Respiratory: Lung sounds clear in all fields. Chest wall symmetric, nontender.   Abdominal: Soft, nondistended, nontender. Bowel sounds normoactive x 4 quadrants.   Skin: Skin is warm, dry and intact without rashes or lesions.   Extremities: No edema, no calf tenderness      Detailed Neurologic Exam:    Mental status: The patient is awake and alert and has normal attention span.  The patient is fully oriented in 3 spheres. The patient is oriented to current events. The patient is able to name objects, follow commands, repeat sentences.    Cranial nerves: Pupils equal and react symmetrically to light. There is no visual field deficit to confrontation. Extraocular motion is full with no nystagmus. There is no ptosis. Facial sensation is intact. Slight R facial asymmetry noted spouse reports baseline). Palate elevates symmetrically. Tongue is midline.    Motor: There is normal bulk and tone.  There is no tremor.  Strength is 5/5 in the right arm and leg.   Strength is 5/5 in the left arm and leg.    Sensation: Intact to light touch in 4 extremities    Cerebellar: There is no dysmetria on finger to nose testing.      LABS:                        12.1   10.02 )-----------( 367      ( 07 Nov 2024 04:12 )             33.7    11-07    136  |  102  |  8.9  ----------------------------<  158[H]  3.6   |  24.0  |  0.64    Ca    8.1[L]      07 Nov 2024 04:12  Phos  2.6     11-07  Mg     2.0     11-07    TPro  5.8[L]  /  Alb  3.2[L]  /  TBili  0.3[L]  /  DBili  0.1  /  AST  14  /  ALT  11  /  AlkPhos  91  11-05 11-05 Chol 148 LDL -- HDL 22[L] Trig 337[H]    A1C: 8.9    IMAGING: Reviewed by me.     (11.04.24 @ 10:28)   IMPRESSION:  HEAD CT: No evidence of an acute intracranial hemorrhage midline shiftor   hydrocephalus.  NECK CTA: No hemodynamic significant narrowing within the neck.  BRAIN CTA: No proximal large vessel occlusion.  CT PERFUSION: No areas of ischemia identified.    CT Cervical Spine No Cont (11.03.24 @ 15:28)   IMPRESSION:  Cervical spine CT: No acute fractures or dislocations.  Mild degenerative disc disease at C6-C7.    Head CT: No acute intracranial hemorrhage, mass effect, or shift of the   midline structures.    MR Head No Cont (11.04.24 @ 20:09)  IMPRESSION:  1)  Unremarkable MR study of the brain. No ischemic changes or   demyelinating foci noted. No hemorrhagic lesion or mass identified.  2)  clear sinuses and mastoids..    Cardiology   TTE W or WO Ultrasound Enhancing Agent (11.04.24 @ 15:37)   CONCLUSIONS:   1. Left ventricular systolic function is normal with an ejection fraction of 59 % by Schneider's method of disks. There are no regional wall motion abnormalities seen.   2. Normal left ventricular diastolic function.   3. Normal right ventricular cavity size and normal right ventricular systolic function.   4. No significant valvular disease.   5. Agitated saline injection was negative for intracardiac shunt.   6. Estimated pulmonary artery systolic pressure is 35 mmHg.   7. No prior echocardiogram is available for comparison.    Ultrasound  US Duplex Venous Lower Ext Complete, Bilateral (11.04.24 @ 13:56)   IMPRESSION:  No evidence of deep venous thrombosis in either lower extremity.    XRAY  Xray Chest 1 View- PORTABLE-Urgent (11.04.24 @ 10:40)  IMPRESSION:   No radiographic evidence of active chest disease..    EEG (11.06.24 @ 14:12)  A normal routine EEG neither refutes nor supports a diagnosis of epilepsy.   No epileptiform abnormalities or seizures captured.    Preliminary note, official recommendations pending attending review/signature   Seen and examined by Stroke team attending/team, assessment/ plan as discussed with stroke team attending/team as noted.     [facility] Stroke Team  Progress Note     HPI:  59 year old female, PMHx HTN, HLD, T1DM, hypothyroidism, current tobacco use (~1ppd for 20 years) presented to [facility] ED 11/4/24 for complaint of slurred speech and right sided weakness. Last known well was 2AM that morning when patient went to sleep; she awoke at 6AM complaining of right upper and lower extremity weakness, as well as word-finding difficulty. Symptoms resolved after about 20 minutes, however recurred again at 8AM and then gradually resolved. Brought in by [place] mobile stroke unit, imaging obtained prior to arrival reportedly negative for any acute abnormalities. Asymptomatic upon ED arrival. CT head, CTA head/neck repeated here, no evidence of hemorrhage or acute infarct, no large vessel occlusion or significant stenosis. Patient currently complaining of headache and neck pain (notes this issue has been ongoing for ~2months, recent hx of epidural steroid injections). Denies any visual changes, chest pain, shortness of breath, abdominal pain. Admitted to stroke service for further work up.     SUBJECTIVE: Febrile overnight.  No new neurologic complaints.  Headache has completely resolved and feels best she has in a while. ROS reported negative unless otherwise noted.    aspirin enteric coated 81 milliGRAM(s) Oral daily  atorvastatin 80 milliGRAM(s) Oral at bedtime  clopidogrel Tablet 75 milliGRAM(s) Oral daily  dextrose 5%. 1000 milliLiter(s) IV Continuous <Continuous>  dextrose 5%. 1000 milliLiter(s) IV Continuous <Continuous>  dextrose 50% Injectable 12.5 Gram(s) IV Push once  dextrose 50% Injectable 25 Gram(s) IV Push once  dextrose 50% Injectable 25 Gram(s) IV Push once  dextrose Oral Gel 15 Gram(s) Oral once PRN  enoxaparin Injectable 40 milliGRAM(s) SubCutaneous every 24 hours  erythromycin   Ointment 1 Application(s) Left EYE three times a day  glucagon  Injectable 1 milliGRAM(s) IntraMuscular once  influenza   Vaccine 0.5 milliLiter(s) IntraMuscular once  insulin glargine Injectable (LANTUS) 30 Unit(s) SubCutaneous at bedtime  insulin lispro (ADMELOG) corrective regimen sliding scale   SubCutaneous three times a day before meals  insulin lispro Injectable (ADMELOG) 8 Unit(s) SubCutaneous three times a day before meals  levothyroxine 137 MICROGram(s) Oral daily  methocarbamol 750 milliGRAM(s) Oral daily  nicotine - 21 mG/24Hr(s) Patch 1 Patch Transdermal daily  sodium chloride 0.9%. 1000 milliLiter(s) IV Continuous <Continuous>      PHYSICAL EXAM:   Vital Signs Last 24 Hrs  T(C): 38.2 (07 Nov 2024 04:30), Max: 38.2 (06 Nov 2024 19:17)  T(F): 100.7 (07 Nov 2024 04:30), Max: 100.7 (06 Nov 2024 19:17)  HR: 74 (07 Nov 2024 04:30) (67 - 87)  BP: 103/60 (07 Nov 2024 04:30) (103/60 - 115/68)  BP(mean): --  RR: 18 (07 Nov 2024 04:30) (18 - 18)  SpO2: 93% (07 Nov 2024 04:30) (93% - 98%)    Parameters below as of 07 Nov 2024 04:30  Patient On (Oxygen Delivery Method): room air      General: No acute distress.   HEENT: Head normocephalic, atraumatic. Conjunctivae clear w/o exudates or hemorrhage, left superior lid mild edema with erythema and no crusting,. Sclera non-icteric. Nares are patent bilaterally.   Cardiac: Normal rate and rhythm.   Respiratory: Lung sounds clear in all fields. Chest wall symmetric, nontender.   Abdominal: Soft, nondistended, nontender. Bowel sounds normoactive x 4 quadrants.   Skin: Skin is warm, dry , few papular lesions in right axilla w/ palpable nodularities   Extremities: No edema, no calf tenderness      Detailed Neurologic Exam:    Mental status: The patient is awake and alert and has normal attention span.  The patient is fully oriented in 3 spheres. The patient is oriented to current events. The patient is able to name objects, follow commands, repeat sentences.    Cranial nerves: Pupils equal and react symmetrically to light. There is no visual field deficit to confrontation. Extraocular motion is full with no nystagmus. There is no ptosis. Facial sensation is intact. Slight R facial asymmetry noted spouse reports baseline). Palate elevates symmetrically. Tongue is midline.    Motor: There is normal bulk and tone.  There is no tremor.  Strength is 5/5 in the right arm and leg.   Strength is 5/5 in the left arm and leg.    Sensation: Intact to light touch in 4 extremities    Cerebellar: There is no dysmetria on finger to nose testing.      LABS:                        12.1   10.02 )-----------( 367      ( 07 Nov 2024 04:12 )             33.7    11-07    136  |  102  |  8.9  ----------------------------<  158[H]  3.6   |  24.0  |  0.64    Ca    8.1[L]      07 Nov 2024 04:12  Phos  2.6     11-07  Mg     2.0     11-07    TPro  5.8[L]  /  Alb  3.2[L]  /  TBili  0.3[L]  /  DBili  0.1  /  AST  14  /  ALT  11  /  AlkPhos  91  11-05 11-05 Chol 148 LDL -- HDL 22[L] Trig 337[H]    A1C: 8.9    IMAGING: Reviewed by me.   Neuro-Imaging  (11.04.24 @ 10:28)   IMPRESSION:  HEAD CT: No evidence of an acute intracranial hemorrhage midline shiftor   hydrocephalus.  NECK CTA: No hemodynamic significant narrowing within the neck.  BRAIN CTA: No proximal large vessel occlusion.  CT PERFUSION: No areas of ischemia identified.    CT Cervical Spine No Cont (11.03.24 @ 15:28)   IMPRESSION:  Cervical spine CT: No acute fractures or dislocations.  Mild degenerative disc disease at C6-C7.    Head CT: No acute intracranial hemorrhage, mass effect, or shift of the   midline structures.    MR Head No Cont (11.04.24 @ 20:09)  IMPRESSION:  1)  Unremarkable MR study of the brain. No ischemic changes or   demyelinating foci noted. No hemorrhagic lesion or mass identified.  2)  clear sinuses and mastoids..    CARDIOLOGY:    TTE W or WO Ultrasound Enhancing Agent (11.04.24 @ 15:37)   CONCLUSIONS:   1. Left ventricular systolic function is normal with an ejection fraction of 59 % by Schneider's method of disks. There are no regional wall motion abnormalities seen.   2. Normal left ventricular diastolic function.   3. Normal right ventricular cavity size and normal right ventricular systolic function.   4. No significant valvular disease.   5. Agitated saline injection was negative for intracardiac shunt.   6. Estimated pulmonary artery systolic pressure is 35 mmHg.   7. No prior echocardiogram is available for comparison.    Body:  CT Abdomen and Pelvis No Cont (11.07.24 @ 06:55)   Pulmonary venous congestion with bilateral pleural effusions.  Trace abdominal ascites.    ULTRASOUND:   US Duplex Venous Lower Ext Complete, Bilateral (11.04.24 @ 13:56)   IMPRESSION:  No evidence of deep venous thrombosis in either lower extremity.    XRAY  Xray Chest 1 View- PORTABLE-Urgent (11.04.24 @ 10:40)  IMPRESSION:   No radiographic evidence of active chest disease..    EEG (11.06.24 @ 14:12)  A normal routine EEG neither refutes nor supports a diagnosis of epilepsy.   No epileptiform abnormalities or seizures captured.    Preliminary note, official recommendations pending attending review/signature   Seen and examined by Stroke team attending/team, assessment/ plan as discussed with stroke team attending/team as noted.     [facility] Stroke Team  Progress Note     HPI:  59 year old female, PMHx HTN, HLD, T1DM, hypothyroidism, current tobacco use (~1ppd for 20 years) presented to [facility] ED 11/4/24 for complaint of slurred speech and right sided weakness. Last known well was 2AM that morning when patient went to sleep; she awoke at 6AM complaining of right upper and lower extremity weakness, as well as word-finding difficulty. Symptoms resolved after about 20 minutes, however recurred again at 8AM and then gradually resolved. Brought in by [place] mobile stroke unit, imaging obtained prior to arrival reportedly negative for any acute abnormalities. Asymptomatic upon ED arrival. CT head, CTA head/neck repeated here, no evidence of hemorrhage or acute infarct, no large vessel occlusion or significant stenosis. Patient currently complaining of headache and neck pain (notes this issue has been ongoing for ~2months, recent hx of epidural steroid injections). Denies any visual changes, chest pain, shortness of breath, abdominal pain. Admitted to stroke service for further work up.     SUBJECTIVE: Febrile overnight.  No new neurologic complaints.  Headache has completely resolved and feels best she has in a while. ROS reported negative unless otherwise noted. No neck pain or limited movement, no light sensitivity.     aspirin enteric coated 81 milliGRAM(s) Oral daily  atorvastatin 80 milliGRAM(s) Oral at bedtime  clopidogrel Tablet 75 milliGRAM(s) Oral daily  dextrose 5%. 1000 milliLiter(s) IV Continuous <Continuous>  dextrose 5%. 1000 milliLiter(s) IV Continuous <Continuous>  dextrose 50% Injectable 12.5 Gram(s) IV Push once  dextrose 50% Injectable 25 Gram(s) IV Push once  dextrose 50% Injectable 25 Gram(s) IV Push once  dextrose Oral Gel 15 Gram(s) Oral once PRN  enoxaparin Injectable 40 milliGRAM(s) SubCutaneous every 24 hours  erythromycin   Ointment 1 Application(s) Left EYE three times a day  glucagon  Injectable 1 milliGRAM(s) IntraMuscular once  influenza   Vaccine 0.5 milliLiter(s) IntraMuscular once  insulin glargine Injectable (LANTUS) 30 Unit(s) SubCutaneous at bedtime  insulin lispro (ADMELOG) corrective regimen sliding scale   SubCutaneous three times a day before meals  insulin lispro Injectable (ADMELOG) 8 Unit(s) SubCutaneous three times a day before meals  levothyroxine 137 MICROGram(s) Oral daily  methocarbamol 750 milliGRAM(s) Oral daily  nicotine - 21 mG/24Hr(s) Patch 1 Patch Transdermal daily  sodium chloride 0.9%. 1000 milliLiter(s) IV Continuous <Continuous>      PHYSICAL EXAM:   Vital Signs Last 24 Hrs  T(C): 38.2 (07 Nov 2024 04:30), Max: 38.2 (06 Nov 2024 19:17)  T(F): 100.7 (07 Nov 2024 04:30), Max: 100.7 (06 Nov 2024 19:17)  HR: 74 (07 Nov 2024 04:30) (67 - 87)  BP: 103/60 (07 Nov 2024 04:30) (103/60 - 115/68)  BP(mean): --  RR: 18 (07 Nov 2024 04:30) (18 - 18)  SpO2: 93% (07 Nov 2024 04:30) (93% - 98%)    Parameters below as of 07 Nov 2024 04:30  Patient On (Oxygen Delivery Method): room air      General: No acute distress.   HEENT: Head normocephalic, atraumatic. Conjunctivae clear w/o exudates or hemorrhage, left superior lid mild edema with erythema and no crusting,. Sclera non-icteric. Nares are patent bilaterally. Neck w/ FROM, no nuchal rigidity   Cardiac: Normal rate and rhythm.   Respiratory: Lung sounds clear in all fields. Chest wall symmetric, nontender.   Abdominal: Soft, nondistended, nontender. Bowel sounds normoactive x 4 quadrants.   Skin: Skin is warm, dry , few papular lesions in right axilla w/ palpable nodularities   Extremities: No edema, no calf tenderness      Detailed Neurologic Exam:    Mental status: The patient is awake and alert and has normal attention span.  The patient is fully oriented in 3 spheres. The patient is oriented to current events. The patient is able to name objects, follow commands, repeat sentences.    Cranial nerves: Pupils equal and react symmetrically to light. There is no visual field deficit to confrontation. Extraocular motion is full with no nystagmus. There is no ptosis. Facial sensation is intact. Slight R facial asymmetry noted spouse reports baseline). Palate elevates symmetrically. Tongue is midline.    Motor: There is normal bulk and tone.  There is no tremor.  Strength is 5/5 in the right arm and leg.   Strength is 5/5 in the left arm and leg.    Sensation: Intact to light touch in 4 extremities    Cerebellar: There is no dysmetria on finger to nose testing.      LABS:                        12.1   10.02 )-----------( 367      ( 07 Nov 2024 04:12 )             33.7    11-07    136  |  102  |  8.9  ----------------------------<  158[H]  3.6   |  24.0  |  0.64    Ca    8.1[L]      07 Nov 2024 04:12  Phos  2.6     11-07  Mg     2.0     11-07    TPro  5.8[L]  /  Alb  3.2[L]  /  TBili  0.3[L]  /  DBili  0.1  /  AST  14  /  ALT  11  /  AlkPhos  91  11-05 11-05 Chol 148 LDL -- HDL 22[L] Trig 337[H]    A1C: 8.9    IMAGING: Reviewed by me.   Neuro-Imaging  (11.04.24 @ 10:28)   IMPRESSION:  HEAD CT: No evidence of an acute intracranial hemorrhage midline shiftor   hydrocephalus.  NECK CTA: No hemodynamic significant narrowing within the neck.  BRAIN CTA: No proximal large vessel occlusion.  CT PERFUSION: No areas of ischemia identified.    CT Cervical Spine No Cont (11.03.24 @ 15:28)   IMPRESSION:  Cervical spine CT: No acute fractures or dislocations.  Mild degenerative disc disease at C6-C7.    Head CT: No acute intracranial hemorrhage, mass effect, or shift of the   midline structures.    MR Head No Cont (11.04.24 @ 20:09)  IMPRESSION:  1)  Unremarkable MR study of the brain. No ischemic changes or   demyelinating foci noted. No hemorrhagic lesion or mass identified.  2)  clear sinuses and mastoids..    CARDIOLOGY:    TTE W or WO Ultrasound Enhancing Agent (11.04.24 @ 15:37)   CONCLUSIONS:   1. Left ventricular systolic function is normal with an ejection fraction of 59 % by Schneider's method of disks. There are no regional wall motion abnormalities seen.   2. Normal left ventricular diastolic function.   3. Normal right ventricular cavity size and normal right ventricular systolic function.   4. No significant valvular disease.   5. Agitated saline injection was negative for intracardiac shunt.   6. Estimated pulmonary artery systolic pressure is 35 mmHg.   7. No prior echocardiogram is available for comparison.    Body:  CT Abdomen and Pelvis No Cont (11.07.24 @ 06:55)   Pulmonary venous congestion with bilateral pleural effusions.  Trace abdominal ascites.    ULTRASOUND:   US Duplex Venous Lower Ext Complete, Bilateral (11.04.24 @ 13:56)   IMPRESSION:  No evidence of deep venous thrombosis in either lower extremity.    XRAY  Xray Chest 1 View- PORTABLE-Urgent (11.04.24 @ 10:40)  IMPRESSION:   No radiographic evidence of active chest disease..    EEG (11.06.24 @ 14:12)  A normal routine EEG neither refutes nor supports a diagnosis of epilepsy.   No epileptiform abnormalities or seizures captured.        [facility] Stroke Team  Progress Note     HPI:  59 year old female, PMHx HTN, HLD, T1DM, hypothyroidism, current tobacco use (~1ppd for 20 years) presented to [facility] ED 11/4/24 for complaint of slurred speech and right sided weakness. Last known well was 2AM that morning when patient went to sleep; she awoke at 6AM complaining of right upper and lower extremity weakness, as well as word-finding difficulty. Symptoms resolved after about 20 minutes, however recurred again at 8AM and then gradually resolved. Brought in by [facility] stroke unit, imaging obtained prior to arrival reportedly negative for any acute abnormalities. Asymptomatic upon ED arrival. CT head, CTA head/neck repeated here, no evidence of hemorrhage or acute infarct, no large vessel occlusion or significant stenosis. Patient currently complaining of headache and neck pain (notes this issue has been ongoing for ~2months, recent hx of epidural steroid injections). Denies any visual changes, chest pain, shortness of breath, abdominal pain. Admitted to stroke service for further work up.     SUBJECTIVE: Febrile overnight.  No new neurologic complaints.  Headache has completely resolved and feels best she has in a while. ROS reported negative unless otherwise noted. No neck pain or limited movement, no light sensitivity.     aspirin enteric coated 81 milliGRAM(s) Oral daily  atorvastatin 80 milliGRAM(s) Oral at bedtime  clopidogrel Tablet 75 milliGRAM(s) Oral daily  dextrose 5%. 1000 milliLiter(s) IV Continuous <Continuous>  dextrose 5%. 1000 milliLiter(s) IV Continuous <Continuous>  dextrose 50% Injectable 12.5 Gram(s) IV Push once  dextrose 50% Injectable 25 Gram(s) IV Push once  dextrose 50% Injectable 25 Gram(s) IV Push once  dextrose Oral Gel 15 Gram(s) Oral once PRN  enoxaparin Injectable 40 milliGRAM(s) SubCutaneous every 24 hours  erythromycin   Ointment 1 Application(s) Left EYE three times a day  glucagon  Injectable 1 milliGRAM(s) IntraMuscular once  influenza   Vaccine 0.5 milliLiter(s) IntraMuscular once  insulin glargine Injectable (LANTUS) 30 Unit(s) SubCutaneous at bedtime  insulin lispro (ADMELOG) corrective regimen sliding scale   SubCutaneous three times a day before meals  insulin lispro Injectable (ADMELOG) 8 Unit(s) SubCutaneous three times a day before meals  levothyroxine 137 MICROGram(s) Oral daily  methocarbamol 750 milliGRAM(s) Oral daily  nicotine - 21 mG/24Hr(s) Patch 1 Patch Transdermal daily  sodium chloride 0.9%. 1000 milliLiter(s) IV Continuous <Continuous>      PHYSICAL EXAM:   Vital Signs Last 24 Hrs  T(C): 38.2 (07 Nov 2024 04:30), Max: 38.2 (06 Nov 2024 19:17)  T(F): 100.7 (07 Nov 2024 04:30), Max: 100.7 (06 Nov 2024 19:17)  HR: 74 (07 Nov 2024 04:30) (67 - 87)  BP: 103/60 (07 Nov 2024 04:30) (103/60 - 115/68)  BP(mean): --  RR: 18 (07 Nov 2024 04:30) (18 - 18)  SpO2: 93% (07 Nov 2024 04:30) (93% - 98%)    Parameters below as of 07 Nov 2024 04:30  Patient On (Oxygen Delivery Method): room air      General: No acute distress.   HEENT: Head normocephalic, atraumatic. Conjunctivae clear w/o exudates or hemorrhage, left superior lid mild edema with erythema and no crusting,. Sclera non-icteric. Nares are patent bilaterally. Neck w/ FROM, no nuchal rigidity   Cardiac: Normal rate and rhythm.   Respiratory: Lung sounds clear in all fields. Chest wall symmetric, nontender.   Abdominal: Soft, nondistended, nontender. Bowel sounds normoactive x 4 quadrants.   Skin: Skin is warm, dry , few papular lesions in right axilla w/ palpable nodularities   Extremities: No edema, no calf tenderness      Detailed Neurologic Exam:    Mental status: The patient is awake and alert and has normal attention span.  The patient is fully oriented in 3 spheres. The patient is oriented to current events. The patient is able to name objects, follow commands, repeat sentences.    Cranial nerves: Pupils equal and react symmetrically to light. There is no visual field deficit to confrontation. Extraocular motion is full with no nystagmus. There is no ptosis. Facial sensation is intact. Slight R facial asymmetry noted spouse reports baseline). Palate elevates symmetrically. Tongue is midline.    Motor: There is normal bulk and tone.  There is no tremor.  Strength is 5/5 in the right arm and leg.   Strength is 5/5 in the left arm and leg.    Sensation: Intact to light touch in 4 extremities    Cerebellar: There is no dysmetria on finger to nose testing.      LABS:                        12.1   10.02 )-----------( 367      ( 07 Nov 2024 04:12 )             33.7    11-07    136  |  102  |  8.9  ----------------------------<  158[H]  3.6   |  24.0  |  0.64    Ca    8.1[L]      07 Nov 2024 04:12  Phos  2.6     11-07  Mg     2.0     11-07    TPro  5.8[L]  /  Alb  3.2[L]  /  TBili  0.3[L]  /  DBili  0.1  /  AST  14  /  ALT  11  /  AlkPhos  91  11-05        11-05 Chol 148 LDL -- HDL 22[L] Trig 337[H]    A1C: 8.9    IMAGING: Reviewed by me.   Neuro-Imaging  (11.04.24 @ 10:28)   IMPRESSION:  HEAD CT: No evidence of an acute intracranial hemorrhage midline shiftor   hydrocephalus.  NECK CTA: No hemodynamic significant narrowing within the neck.  BRAIN CTA: No proximal large vessel occlusion.  CT PERFUSION: No areas of ischemia identified.    CT Cervical Spine No Cont (11.03.24 @ 15:28)   IMPRESSION:  Cervical spine CT: No acute fractures or dislocations.  Mild degenerative disc disease at C6-C7.    Head CT: No acute intracranial hemorrhage, mass effect, or shift of the   midline structures.    MR Head No Cont (11.04.24 @ 20:09)  IMPRESSION:  1)  Unremarkable MR study of the brain. No ischemic changes or   demyelinating foci noted. No hemorrhagic lesion or mass identified.  2)  clear sinuses and mastoids..    CARDIOLOGY:    TTE W or WO Ultrasound Enhancing Agent (11.04.24 @ 15:37)   CONCLUSIONS:   1. Left ventricular systolic function is normal with an ejection fraction of 59 % by Schneider's method of disks. There are no regional wall motion abnormalities seen.   2. Normal left ventricular diastolic function.   3. Normal right ventricular cavity size and normal right ventricular systolic function.   4. No significant valvular disease.   5. Agitated saline injection was negative for intracardiac shunt.   6. Estimated pulmonary artery systolic pressure is 35 mmHg.   7. No prior echocardiogram is available for comparison.    Body:  CT Abdomen and Pelvis No Cont (11.07.24 @ 06:55)   Pulmonary venous congestion with bilateral pleural effusions.  Trace abdominal ascites.    ULTRASOUND:   US Duplex Venous Lower Ext Complete, Bilateral (11.04.24 @ 13:56)   IMPRESSION:  No evidence of deep venous thrombosis in either lower extremity.    XRAY  Xray Chest 1 View- PORTABLE-Urgent (11.04.24 @ 10:40)  IMPRESSION:   No radiographic evidence of active chest disease..    EEG (11.06.24 @ 14:12)  A normal routine EEG neither refutes nor supports a diagnosis of epilepsy.   No epileptiform abnormalities or seizures captured.

## 2024-11-07 NOTE — CONSULT NOTE ADULT - ASSESSMENT
59 year old female, PMHx HTN, HLD, T1DM, hypothyroidism, current tobacco use (~1ppd for 20 years) presented to [facility] ED 11/4/24 for complaint of slurred speech and right sided weakness. Symptoms since resolved. CTA-B/ MRI H negative. Patient began having temps on admission heme/onc now consulted.    11/7/24- CT C/A/P-Pulmonary venous congestion with bilateral pleural effusions.Nonspecific bilateral pelvic sidewall nodes measuring up to 1.0cm in short axis, bilaterally    #Fevers   -patient admitted w/ likely TIA presenting w/ slurred speech has since resolved Imagining of head negative for CVA.   -patient w/o cytopenias  -CT C/A/P listed above w/ nonspecific b/l pelvic LN    RECS  -continue infectious w/u r/o underlying infections   -pending ID eval   -considering patient w.o cytopenias, fevers onset only 3 days w/o previous c/o weight loss, night sweats or prior fevers less likely to be 2/2 malig source  -CT  showing non-specfic b/l pelvic LN -can consider repeat in 3 months or so to assess for resolution   -can send LDH, ferritin  -continue to trend tremps     *Note not finalized until signed by Attending Physician    Thank you for the referral.   Please call with any questions [phone]  Above reviewed with Attending Dr. Guadalupe     [facility]  [address], [place] [id number] [phone]    59 year old female, PMHx HTN, HLD, T1DM, hypothyroidism, current tobacco use (~1ppd for 20 years) presented to [facility] ED 11/4/24 for complaint of slurred speech and right sided weakness. Symptoms since resolved. CTA-B/ MRI H negative. Patient began having temps on admission heme/onc now consulted.    11/7/24- CT C/A/P-Pulmonary venous congestion with bilateral pleural effusions.Nonspecific bilateral pelvic sidewall nodes measuring up to 1.0cm in short axis, bilaterally    #Fevers   -patient admitted w/ likely TIA presenting w/ slurred speech has since resolved Imagining of head negative for CVA.   -patient w/o cytopenias  -CT C/A/P listed above w/ nonspecific b/l pelvic LN    RECS  -continue infectious w/u r/o underlying infections   -pending ID eval   -considering patient w.o cytopenias, fevers onset only 3 days w/o previous c/o weight loss, night sweats or prior fevers less likely to be 2/2 malig source  -CT  showing non-specfic b/l pelvic LN -can consider repeat in 3 months or so to assess for resolution   -can send LDH, ferritin  -continue to trend tremps         Thank you for the referral.   Please call with any questions [phone]  Above reviewed with Attending Dr. Guadalupe     [facility]  [address] [place] [address]  [phone]

## 2024-11-07 NOTE — PROGRESS NOTE ADULT - ASSESSMENT
59F with PMHx HTN, HLD, T1DM, hypothyroidism, current tobacco use (~1ppd for 20 years) presented to [facility] ED 11/4/24 for complaint of slurred speech and right sided weakness. Admitted to stroke service for further work up.    Has h/o DM type 1, since 25 years, a1c 8.9% on admission, at home on tresiba 36 units daily and admelog 6 to 8 units tid with meals, took last dose of tresiba , last night.    Consulted for diabetes management  Home regimen: Tresiba 36u, admelog 6-8u TID  Current a1c: 8.9%  Outpatient Endocrinologist: Dr Benz    1. Poorly controlled DM1  - FS 95 this AM, decrease lantus to 26 units qhs  - Admelog held at breakast, will likely be elevated at lunch  - Continue admelog 8 units TID  - Correction scale  - Follow up appt: 12/30 @ 9AM with ROSALINDA Aguiar ([address] [facility])  - Pt wanted to switch providers, scheduled an appt with Dr Valadez for 4/2/25    2. Hypothyroidism  - TSH 0.76, FT4 1.3  - Continue home dose of  levothyroxine 137 mcg daily     3. TIA, rule out stroke  - Neuro checks, MRI pending  - C/w ASA and plavix, statin     59F with PMHx HTN, HLD, T1DM, hypothyroidism, current tobacco use (~1ppd for 20 years) presented to [facility] ED 11/4/24 for complaint of slurred speech and right sided weakness. Admitted to stroke service for further work up.    Has h/o DM type 1, since 25 years, a1c 8.9% on admission, at home on tresiba 36 units daily and admelog 6 to 8 units tid with meals, took last dose of tresiba , last night.    Consulted for diabetes management  Home regimen: Tresiba 36u, admelog 6-8u TID  Current a1c: 8.9%  Outpatient Endocrinologist: Dr Benz    1. Poorly controlled DM1  - FS 95 this AM, decrease lantus to 26 units qhs  - Continue admelog 8 units TID  - Correction scale  - Follow up appt: 12/30 @ 9AM with ROSALINDA Aguiar ([address] [facility])  - Pt wanted to switch providers, scheduled an appt with Dr Valadez for 4/2/25    2. Hypothyroidism  - TSH 0.76, FT4 1.3  - Continue home dose of  levothyroxine 137 mcg daily     3. TIA, rule out stroke  - Neuro checks, MRI pending  - C/w ASA and plavix, statin

## 2024-11-07 NOTE — CONSULT NOTE ADULT - SUBJECTIVE AND OBJECTIVE BOX
Hematology Consult Note    HPI:  59 year old female, PMHx HTN, HLD, T1DM, hypothyroidism, current tobacco use (~1ppd for 20 years) presented to [facility] ED 11/4/24 for complaint of slurred speech and right sided weakness. Symptoms resolved after about 20 minutes, rought in by [facility] mobile stroke unit, imaging obtained prior to arrival reportedly negative for any acute abnormalities. Asymptomatic upon ED arrival. CT head, CTA head/neck repeated here, no evidence of hemorrhage or acute infarct, no large vessel occlusion or significant stenosis. Patient currently complaining of headache and neck pain (notes this issue has been ongoing for ~2months, recent hx of epidural steroid injections). Denies any visual changes, chest pain, shortness of breath, abdominal pain. Admitted to stroke service for further work up.  (04 Nov 2024 12:54)      Allergies    No Known Allergies    Intolerances        MEDICATIONS  (STANDING):  aspirin enteric coated 81 milliGRAM(s) Oral daily  atorvastatin 80 milliGRAM(s) Oral at bedtime  clopidogrel Tablet 75 milliGRAM(s) Oral daily  dextrose 5%. 1000 milliLiter(s) (50 mL/Hr) IV Continuous <Continuous>  dextrose 5%. 1000 milliLiter(s) (100 mL/Hr) IV Continuous <Continuous>  dextrose 50% Injectable 12.5 Gram(s) IV Push once  dextrose 50% Injectable 25 Gram(s) IV Push once  dextrose 50% Injectable 25 Gram(s) IV Push once  enoxaparin Injectable 40 milliGRAM(s) SubCutaneous every 24 hours  erythromycin   Ointment 1 Application(s) Left EYE three times a day  glucagon  Injectable 1 milliGRAM(s) IntraMuscular once  influenza   Vaccine 0.5 milliLiter(s) IntraMuscular once  insulin glargine Injectable (LANTUS) 26 Unit(s) SubCutaneous at bedtime  insulin lispro (ADMELOG) corrective regimen sliding scale   SubCutaneous three times a day before meals  insulin lispro Injectable (ADMELOG) 8 Unit(s) SubCutaneous three times a day before meals  levothyroxine 137 MICROGram(s) Oral daily  methocarbamol 750 milliGRAM(s) Oral daily  nicotine - 21 mG/24Hr(s) Patch 1 Patch Transdermal daily    MEDICATIONS  (PRN):  dextrose Oral Gel 15 Gram(s) Oral once PRN Blood Glucose LESS THAN 70 milliGRAM(s)/deciliter      PAST MEDICAL & SURGICAL HISTORY:  Diabetes      HTN (hypertension)      Hypothyroidism, adult      High cholesterol      No significant past surgical history          FAMILY HISTORY:      SOCIAL HISTORY: No EtOH, no tobacco    REVIEW OF SYSTEMS:    CONSTITUTIONAL: fevers since admission otherwise haja weight loss fever or chills   EYES/ENT: No visual changes;  No vertigo or throat pain   NECK: No pain or stiffness  RESPIRATORY: No cough, wheezing, hemoptysis; No shortness of breath  CARDIOVASCULAR: No chest pain or palpitations  GASTROINTESTINAL: No abdominal or epigastric pain. No nausea, vomiting, or hematemesis; No diarrhea or constipation. No melena or hematochezia.  GENITOURINARY: No dysuria, frequency or hematuria  NEUROLOGICAL: No numbness or weakness  SKIN: No itching, burning, rashes, or lesions   All other review of systems is negative unless indicated above.        T(F): 98.8 (11-07-24 @ 11:20), Max: 100.7 (11-06-24 @ 19:17)  HR: 74 (11-07-24 @ 11:20)  BP: 101/63 (11-07-24 @ 11:20)  RR: 18 (11-07-24 @ 11:20)  SpO2: 98% (11-07-24 @ 11:20)  Wt(kg): --    GENERAL: NAD, well-developed  HEAD:  Atraumatic, Normocephalic  EYES: EOMI, PERRLA, conjunctiva and sclera clear  NECK: Supple, No JVD  CHEST/LUNG: Clear to auscultation bilaterally; No wheeze  HEART: Regular rate and rhythm; No murmurs, rubs, or gallops  ABDOMEN: Soft, Nontender, Nondistended; Bowel sounds present  EXTREMITIES:  2+ Peripheral Pulses, No clubbing, cyanosis, or edema  NEUROLOGY: non-focal  SKIN: No rashes or lesions                          12.1   10.02 )-----------( 367      ( 07 Nov 2024 04:12 )             33.7       11-07    136  |  102  |  8.9  ----------------------------<  158[H]  3.6   |  24.0  |  0.64    Ca    8.1[L]      07 Nov 2024 04:12  Phos  2.6     11-07  Mg     2.0     11-07        Magnesium: 2.0 mg/dL (11-07 @ 04:12)  Phosphorus: 2.6 mg/dL (11-07 @ 04:12)  Phosphorus: 2.8 mg/dL (11-06 @ 18:12)  Magnesium: 2.0 mg/dL (11-06 @ 18:12)      < from: CT Abdomen and Pelvis No Cont (11.07.24 @ 06:55) >  ACC: [id number] EXAM:  CT ABDOMEN AND PELVIS   ORDERED BY: ALEJANDRO MUNOZ     ACC: [id number] EXAM:  CT CHEST   ORDERED BY: ALEJANDRO MUNOZ     PROCEDURE DATE:  11/07/2024          INTERPRETATION:  CLINICAL INFORMATION: Febrile. Rule out infection.    COMPARISON: None.    CONTRAST/COMPLICATIONS:  IV Contrast: NONE  Oral Contrast: NONE  Complications: None reported at time of study completion    PROCEDURE:  CT of the Chest, Abdomen and Pelvis was performed.  Sagittal and coronal reformats were performed.    FINDINGS:  CHEST:  LUNGS AND LARGE AIRWAYS: Patent central airways. Mild bilateral   subsegmental atelectasis. Interlobular septal thickening.  PLEURA: Small right and trace left pleural effusions.  VESSELS: Within normal limits.  HEART: Heart size is normal. Trace pericardial effusion.  MEDIASTINUM AND GENI: Few small volume mediastinal nodes.  CHEST WALL AND LOWER NECK: Within normal limits.    ABDOMEN AND PELVIS:  LIVER: Within normal limits.  BILE DUCTS: Normal caliber.  GALLBLADDER: Within normal limits.  SPLEEN: Within normal limits.  PANCREAS: Within normal limits.  ADRENALS: Within normal limits.  KIDNEYS/URETERS: Within normal limits.    BLADDER: Within normal limits.  REPRODUCTIVE ORGANS: Uterus and adnexa within normal limits.    BOWEL: No bowel obstruction. Small hiatal hernia. Appendix is normal.  PERITONEUM/RETROPERITONEUM: Trace intra-abdominal ascites.  VESSELS: Within normal limits.  LYMPH NODES: Nonspecific bilateral pelvic sidewall nodes measuring up to   1.0cm in short axis, bilaterally.  ABDOMINAL WALL: Within normal limits.  BONES: Degenerative changes.    IMPRESSION:  Pulmonary venous congestion with bilateral pleural effusions.    Trace abdominal ascites.        --- End of Report ---

## 2024-11-07 NOTE — CONSULT NOTE ADULT - SUBJECTIVE AND OBJECTIVE BOX
[facility]  INFECTIOUS DISEASES at [place] / [place] / [place]  =======================================================                               Luis Colin MD#   Brian Lawler MD*                             Donna Ruiz MD*   Johanna Mccauley MD*                              Professor Emeritus:  Dr Don Edgar MD^            Diplomates American Board of Internal Medicine & Infectious Diseases                # [place] Office - Appt - Tel  [phone] Fax [phone]                * [place] Office - Appt - Tel [phone] Fax [phone]               ^ [place] Office - Appt - Tel  [phone] Fax [phone]                                  Hospital Consult line:  [phone]  =======================================================      [id number]  VINCENZO DAWSON    CC: Patient is a 59y old  Female who presents with a chief complaint of Concern for stroke (07 Nov 2024 11:19)      BRIEF HOSPITAL COURSE:  59F current smoker (1PPD for 20yrs) with PMHx of HTN, HLD, T1DM, Hypothyroidism, presented with c/o slurred speech and right sided weakness which resolved ~20mins after onset. Pt experienced second episode which spontaneously resolved as well. CTH, CTA head/neck w/o evidence of hemorrhage or acute infarct, no large vessel occlusion or significant stenosis. MRI without infarction. Pt also endorsed headache which now resolved. Admitted for TIA. Pt has been having intermittent fever.       Past Medical & Surgical Hx:  PAST MEDICAL & SURGICAL HISTORY:  Diabetes  HTN (hypertension)  Hypothyroidism, adult  High cholesterol  No significant past surgical history    Social Hx:  FAMILY HISTORY:  Allergies  No Known Allergies  Intolerances      REVIEW OF SYSTEMS:  CONSTITUTIONAL:  No Fever or chills  HEENT:  No diplopia or blurred vision.  No earache, sore throat or runny nose.  CARDIOVASCULAR:  No chest pain  RESPIRATORY:  No cough, shortness of breath  GASTROINTESTINAL:  No nausea, vomiting or diarrhea.  GENITOURINARY:  No dysuria, frequency or urgency. No Blood in urine  MUSCULOSKELETAL:  no joint aches, no muscle pain  SKIN:  No change in skin, hair or nails.  NEUROLOGIC:  No Headaches, seizures  PSYCHIATRIC:  No disorder of thought or mood.  ENDOCRINE:  No heat or cold intolerance  HEMATOLOGICAL:  No easy bruising or bleeding.       Physical Exam:  GEN: NAD  HEENT: normocephalic and atraumatic. EOMI. PERRL.    NECK: Supple.   LUNGS: CTA B/L.  HEART: RRR  ABDOMEN: Soft, NT, ND.  +BS.    : No CVA tenderness  EXTREMITIES: Without  edema.  MSK: No joint swelling  NEUROLOGIC: No Focal Deficits   PSYCHIATRIC: Appropriate affect .  SKIN: No rash      Vitals:  T(F): 98.8 (07 Nov 2024 11:20), Max: 100.7 (06 Nov 2024 19:17)  HR: 74 (07 Nov 2024 11:20)  BP: 101/63 (07 Nov 2024 11:20)  RR: 18 (07 Nov 2024 11:20)  SpO2: 98% (07 Nov 2024 11:20) (93% - 98%)  temp max in last 48H T(F): , Max: 101.6 (11-05-24 @ 23:24)    Current Antibiotics:    Other medications:  aspirin enteric coated 81 milliGRAM(s) Oral daily  atorvastatin 80 milliGRAM(s) Oral at bedtime  clopidogrel Tablet 75 milliGRAM(s) Oral daily  dextrose 5%. 1000 milliLiter(s) IV Continuous <Continuous>  dextrose 5%. 1000 milliLiter(s) IV Continuous <Continuous>  dextrose 50% Injectable 12.5 Gram(s) IV Push once  dextrose 50% Injectable 25 Gram(s) IV Push once  dextrose 50% Injectable 25 Gram(s) IV Push once  enoxaparin Injectable 40 milliGRAM(s) SubCutaneous every 24 hours  erythromycin   Ointment 1 Application(s) Left EYE three times a day  glucagon  Injectable 1 milliGRAM(s) IntraMuscular once  influenza   Vaccine 0.5 milliLiter(s) IntraMuscular once  insulin glargine Injectable (LANTUS) 26 Unit(s) SubCutaneous at bedtime  insulin lispro (ADMELOG) corrective regimen sliding scale   SubCutaneous three times a day before meals  insulin lispro Injectable (ADMELOG) 8 Unit(s) SubCutaneous three times a day before meals  levothyroxine 137 MICROGram(s) Oral daily  methocarbamol 750 milliGRAM(s) Oral daily  nicotine - 21 mG/24Hr(s) Patch 1 Patch Transdermal daily                            12.1   10.02 )-----------( 367      ( 07 Nov 2024 04:12 )             33.7     11-07    136  |  102  |  8.9  ----------------------------<  158[H]  3.6   |  24.0  |  0.64    Ca    8.1[L]      07 Nov 2024 04:12  Phos  2.6     11-07  Mg     2.0     11-07      RECENT CULTURES:  11-07 @ 08:00          NotDetec  11-05 @ 14:00 .Blood BLOOD     No growth at 24 hours        11-04 @ 23:20          NotDete  11-04 @ 23:14 .Blood BLOOD     No growth at 48 Hours        11-03 @ 12:40 .Blood BLOOD     No growth at 72 Hours        WBC Count: 10.02 K/uL (11-07-24 @ 04:12)  WBC Count: 11.18 K/uL (11-06-24 @ 04:00)  WBC Count: 10.34 K/uL (11-05-24 @ 13:00)  WBC Count: 11.16 K/uL (11-04-24 @ 23:14)  WBC Count: 9.35 K/uL (11-04-24 @ 09:59)  WBC Count: 12.51 K/uL (11-03-24 @ 12:40)    Creatinine: 0.64 mg/dL (11-07-24 @ 04:12)  Creatinine: 0.78 mg/dL (11-06-24 @ 18:12)  Creatinine: 0.58 mg/dL (11-06-24 @ 04:00)  Creatinine: 0.72 mg/dL (11-05-24 @ 13:00)  Creatinine: 0.61 mg/dL (11-04-24 @ 09:59)  Creatinine: 0.66 mg/dL (11-03-24 @ 12:40)    C-Reactive Protein: 97 mg/L (11-06-24 @ 18:12)    Sedimentation Rate, Erythrocyte: 53 mm/hr (11-06-24 @ 18:12)    SARS-CoV-2: NotDetec (11-07-24 @ 08:00)  SARS-CoV-2: NotDetec (11-04-24 @ 23:20)     [facility]  INFECTIOUS DISEASES at [place] / [place] / [place]  =======================================================                              Luis Adame MD                              Professor Emeritus:  Dr Don Edgar MD            Diplomates American Board of Internal Medicine & Infectious Diseases                                   Tel  [phone] Fax [phone]                                  Hospital Consult line:  [phone]  =======================================================      [id number]  VINCENZO DAWSON    CC: Patient is a 59y old  Female who presents with a chief complaint of Concern for stroke (07 Nov 2024 11:19)      BRIEF HOSPITAL COURSE:  59F current smoker (1PPD for 20yrs) with PMHx of HTN, HLD, T1DM, Hypothyroidism, presented with c/o slurred speech and right sided weakness which resolved ~20mins after onset. Pt experienced second episode which spontaneously resolved as well. CTH, CTA head/neck w/o evidence of hemorrhage or acute infarct, no large vessel occlusion or significant stenosis. MRI without infarction. Pt also endorsed headache which now resolved. Admitted for TIA. Pt has been having intermittent fever.       Past Medical & Surgical Hx:  Diabetes  HTN (hypertension)  Hypothyroidism, adult  High cholesterol  No significant past surgical history      Social Hx:  Active smoker      FAMILY HISTORY:  Denies      Allergies  No Known Allergies  Intolerances      REVIEW OF SYSTEMS:  CONSTITUTIONAL:  No Fever or chills  HEENT:  No diplopia or blurred vision.  No earache, sore throat or runny nose.  CARDIOVASCULAR:  No chest pain  RESPIRATORY:  No cough, shortness of breath  GASTROINTESTINAL:  No nausea, vomiting or diarrhea.  GENITOURINARY:  No dysuria, frequency or urgency. No Blood in urine  MUSCULOSKELETAL:  no joint aches, no muscle pain  SKIN:  No change in skin, hair or nails.  NEUROLOGIC:  No Headaches,      Physical Exam:  GEN: NAD  HEENT: normocephalic and atraumatic. EOMI. PERRL.    NECK: Supple.   LUNGS: CTA B/L.  HEART: RRR  ABDOMEN: Soft, NT, ND.  +BS.    : No CVA tenderness  EXTREMITIES: Without  edema.  MSK: No joint swelling  NEUROLOGIC: No Focal Deficits   PSYCHIATRIC: Appropriate affect .  SKIN: No rash      Vitals:  T(F): 98.8 (07 Nov 2024 11:20), Max: 100.7 (06 Nov 2024 19:17)  HR: 74 (07 Nov 2024 11:20)  BP: 101/63 (07 Nov 2024 11:20)  RR: 18 (07 Nov 2024 11:20)  SpO2: 98% (07 Nov 2024 11:20) (93% - 98%)  temp max in last 48H T(F): , Max: 101.6 (11-05-24 @ 23:24)      Current Antibiotics:    Other medications:  aspirin enteric coated 81 milliGRAM(s) Oral daily  atorvastatin 80 milliGRAM(s) Oral at bedtime  clopidogrel Tablet 75 milliGRAM(s) Oral daily  dextrose 5%. 1000 milliLiter(s) IV Continuous <Continuous>  dextrose 5%. 1000 milliLiter(s) IV Continuous <Continuous>  dextrose 50% Injectable 12.5 Gram(s) IV Push once  dextrose 50% Injectable 25 Gram(s) IV Push once  dextrose 50% Injectable 25 Gram(s) IV Push once  enoxaparin Injectable 40 milliGRAM(s) SubCutaneous every 24 hours  erythromycin   Ointment 1 Application(s) Left EYE three times a day  glucagon  Injectable 1 milliGRAM(s) IntraMuscular once  influenza   Vaccine 0.5 milliLiter(s) IntraMuscular once  insulin glargine Injectable (LANTUS) 26 Unit(s) SubCutaneous at bedtime  insulin lispro (ADMELOG) corrective regimen sliding scale   SubCutaneous three times a day before meals  insulin lispro Injectable (ADMELOG) 8 Unit(s) SubCutaneous three times a day before meals  levothyroxine 137 MICROGram(s) Oral daily  methocarbamol 750 milliGRAM(s) Oral daily  nicotine - 21 mG/24Hr(s) Patch 1 Patch Transdermal daily                            12.1   10.02 )-----------( 367      ( 07 Nov 2024 04:12 )             33.7     11-07    136  |  102  |  8.9  ----------------------------<  158[H]  3.6   |  24.0  |  0.64    Ca    8.1[L]      07 Nov 2024 04:12  Phos  2.6     11-07  Mg     2.0     11-07      RECENT CULTURES:  11-07 @ 08:00    RVP  [facility]    11-05 @ 14:00 .Blood BLOOD     No growth at 24 hours    11-04 @ 23:20    RVP  [facility]    11-04 @ 23:14 .Blood BLOOD     No growth at 48 Hours    11-03 @ 12:40 .Blood BLOOD     No growth at 72 Hours      WBC Count: 10.02 K/uL (11-07-24 @ 04:12)  WBC Count: 11.18 K/uL (11-06-24 @ 04:00)  WBC Count: 10.34 K/uL (11-05-24 @ 13:00)  WBC Count: 11.16 K/uL (11-04-24 @ 23:14)  WBC Count: 9.35 K/uL (11-04-24 @ 09:59)  WBC Count: 12.51 K/uL (11-03-24 @ 12:40)    Creatinine: 0.64 mg/dL (11-07-24 @ 04:12)  Creatinine: 0.78 mg/dL (11-06-24 @ 18:12)  Creatinine: 0.58 mg/dL (11-06-24 @ 04:00)  Creatinine: 0.72 mg/dL (11-05-24 @ 13:00)  Creatinine: 0.61 mg/dL (11-04-24 @ 09:59)  Creatinine: 0.66 mg/dL (11-03-24 @ 12:40)    C-Reactive Protein: 97 mg/L (11-06-24 @ 18:12)    Sedimentation Rate, Erythrocyte: 53 mm/hr (11-06-24 @ 18:12)    SARS-CoV-2: NotDetec (11-07-24 @ 08:00)  SARS-CoV-2: NotDete (11-04-24 @ 23:20)

## 2024-11-07 NOTE — CONSULT NOTE ADULT - ATTENDING COMMENTS
Patient was seen and examined with medical resident.  Edits made to notes above.  History Assessments and Plan discussed with medical resident.  See haley for Attending Note.    63-year-old female active smoker, with a history of type 1 diabetes mellitus, hypertension, hyperlipidemia.  Patient presented to [facility] emergency room on November 4, 2024 with slurred speech.  Patient was admitted by the stroke team and evaluated for stroke found to have a TIA.  Since admission has been having intermittent fevers.  No antibiotics were administered since no source of infection was found.  Patient now with persistent fevers.  Infectious diseases evaluation requested.    Fevers for 3 days in the hospital unclear if more fevers outside hospital  Lymphadenopathy    - Check CT Chest  - Check CT A/P with contrast  - Check Procalcitonin level  - Check RUTH, RF  - CT A/P without contrast reviewed  - May need lymph node biopsy  - Continue to monitor off antibiotics  - Hold off on PICC/Midline for now unless needed for reasons other than infectious diseases  - Follow up cultures  - Trend Fever  - Trend WBC      Thank you for allowing me to participate in the care of your patient.   Will Follow    Discussed treatment plan with: Dr Diaz

## 2024-11-07 NOTE — PHARMACOTHERAPY INTERVENTION NOTE - COMMENTS
Referenced outpatient medical fill records and spoke with patient at bedside to obtain medication list. Of note, patient was prescribed azithromycin 500mg 1 tablet once a day for 3 days on 11/1. Patient confirms they completed 2 doses however is unable to confirm if they completed the 3rd dose.

## 2024-11-07 NOTE — CONSULT NOTE ADULT - ASSESSMENT
59F current smoker (1PPD for 20yrs) with PMHx of HTN, HLD, T1DM, Hypothyroidism, presented with c/o slurred speech and right sided weakness which resolved ~20mins after onset. Pt experienced second episode which spontaneously resolved as well. CTH, CTA head/neck w/o evidence of hemorrhage or acute infarct, no large vessel occlusion or significant stenosis. MRI without infarction. Pt also endorsed headache which now resolved. Admitted for TIA. ID consulted for intermittent fever.     PLAN    Fever of unknown origin   Febrile and mild leukocytosis   - Currently hemodynamically stable  - CT Chest and A/P noted small B/L pleural effusions, trance pericardial effusion and trace abdominal ascites    - B/L LE duplex ruled DVT  - UA negative, CXR negative  - TTE unremarkable   - Blood cultures x3 NGTD  - RVP, COVID-19 PCR negative   - Elevated ESR and CRP  - Consider RUTH. Autoimmune in differential  - Agree with checking tumor markers   - Monitor off antibiotics   - Trend Fever and WBC curve   - Tylenol PRN 59F current smoker (1PPD for 20yrs) with PMHx of HTN, HLD, T1DM, Hypothyroidism, presented with c/o slurred speech and right sided weakness which resolved ~20mins after onset. Pt experienced second episode which spontaneously resolved as well. CTH, CTA head/neck w/o evidence of hemorrhage or acute infarct, no large vessel occlusion or significant stenosis. MRI without infarction. Pt also endorsed headache which now resolved. Admitted for TIA. ID consulted for intermittent fever.     Pt with h/o folliculitis completed abx in 8/2024 and recently taken 3 days courses of Azithromycin (unsure of the reason) and then started experiencing watery diarrhea. Pt gave stool sample to urgent care. Pt also mentioned [place] and was bitten by a bug which left a blister.       PLAN    Fever of unknown origin   Febrile and mild leukocytosis   - Currently hemodynamically stable  - CT Chest and A/P noted small B/L pleural effusions, trance pericardial effusion and trace abdominal ascites    - B/L LE duplex ruled DVT  - UA negative, CXR negative  - TTE unremarkable   - Blood cultures x3 NGTD  - RVP, COVID-19 PCR negative   - Elevated ESR and CRP  - Consider RUTH. Autoimmune in differential  - Agree with checking tumor markers   - Monitor off antibiotics   - Trend Fever and WBC curve   - Tylenol PRN 59F current smoker (1PPD for 20yrs) with PMHx of HTN, HLD, T1DM, Hypothyroidism, presented with c/o slurred speech and right sided weakness which resolved ~20mins after onset. Pt experienced second episode which spontaneously resolved as well. CTH, CTA head/neck w/o evidence of hemorrhage or acute infarct, no large vessel occlusion or significant stenosis. MRI without infarction. Pt also endorsed headache which now resolved. Admitted for TIA. ID consulted for intermittent fever.     Pt with h/o B/L folliculitis completed abx in 8/2024 and recently taken 3 days courses of Azithromycin (unsure of the reason) and then started experiencing watery diarrhea. Pt gave stool sample to [facility]. Pt also mentioned she was at a resort in [facility] last week and was bitten by a bug which left a blister.       PLAN    Fever  Febrile and mild leukocytosis   - Currently hemodynamically stable  - CT Chest and A/P non-cont noted small B/L pleural effusions, trance pericardial effusion and trace abdominal ascites    - B/L LE duplex ruled DVT  - UA negative, CXR negative  - TTE unremarkable   - Blood cultures x3 NGTD  - RVP, COVID-19 PCR negative   - Order CT Chest and A/P with contrast   - RUTH and RF ordered for Autoimmune in differential  - Agree with checking tumor markers   - Monitor off antibiotics   - Trend Fever and WBC curve   - Tylenol PRN 59F current smoker (1PPD for 20yrs) with PMHx of HTN, HLD, T1DM, Hypothyroidism, presented with c/o slurred speech and right sided weakness which resolved ~20mins after onset. Pt experienced second episode which spontaneously resolved as well. CTH, CTA head/neck w/o evidence of hemorrhage or acute infarct, no large vessel occlusion or significant stenosis. MRI without infarction. Pt also endorsed headache which now resolved. Admitted for TIA. ID consulted for intermittent fever.     Pt with h/o B/L folliculitis completed abx in 8/2024 and recently taken 3 days courses of Azithromycin (unsure of the reason) and then started experiencing watery diarrhea. Pt gave stool sample to [facility] urgent care. Denies any more of diarrhea. Pt also mentioned she was at a resort in [facility] last week and was bitten by a bug which left a blister.       PLAN    Fever  Febrile and mild leukocytosis   - Currently hemodynamically stable  - CT Chest and A/P non-cont noted small B/L pleural effusions, trance pericardial effusion and trace abdominal ascites    - B/L LE duplex ruled DVT  - UA negative, CXR negative  - TTE unremarkable   - Blood cultures x3 NGTD  - RVP, COVID-19 PCR negative   - Order CT Chest and A/P with contrast   - RUTH and RF ordered for Autoimmune in differential  - Agree with checking tumor markers   - Monitor off antibiotics   - Trend Fever and WBC curve   - Tylenol PRN 59F current smoker (1PPD for 20yrs) with PMHx of HTN, HLD, T1DM, Hypothyroidism, presented with c/o slurred speech and right sided weakness which resolved ~20mins after onset. Pt experienced second episode which spontaneously resolved as well. CTH, CTA head/neck w/o evidence of hemorrhage or acute infarct, no large vessel occlusion or significant stenosis. MRI without infarction. Pt also endorsed headache which now resolved. Admitted for TIA. ID consulted for intermittent fever.     Pt with h/o B/L folliculitis completed abx in 8/2024 and recently taken 3 days courses of Azithromycin (unsure of the reason) and then started experiencing watery diarrhea. Pt gave stool sample to [facility] urgent care. Denies any more of diarrhea. Pt also mentioned she was at a resort in [facility] last week and was bitten by a bug which left a blister.       PLAN    Fever  Febrile and mild leukocytosis   - Currently hemodynamically stable  - CT Chest and A/P non-cont noted small B/L pleural effusions, trance pericardial effusion and trace abdominal ascites    - B/L LE duplex ruled DVT  - UA negative, CXR negative  - TTE unremarkable   - Blood cultures x3 NGTD  - RVP, COVID-19 PCR negative   - Order CT Chest and A/P with contrast   - RUTH and RF ordered for Autoimmune in differential  - Agree with checking tumor markers   - Monitor off antibiotics   - Trend Fever and WBC curve   - Tylenol PRN

## 2024-11-07 NOTE — PROGRESS NOTE ADULT - ASSESSMENT
INCOMPLETE NOTE ******************************************************************  59 year old female, PMHx HTN, HLD, T1DM, hypothyroidism, current tobacco use (~1ppd for 20 years) presented to [facility] ED 11/4/24 for complaint of slurred speech and right sided weakness. Last known well was 2AM that morning when patient went to sleep; she awoke at 6AM complaining of right upper and lower extremity weakness, as well as word-finding difficulty. Symptoms resolved after about 20 minutes, however recurred again at 8AM and then gradually resolved. Brought in by [place] mobile stroke unit, imaging obtained prior to arrival reportedly negative for any acute abnormalities. Asymptomatic upon ED arrival. CT head, CTA head/neck repeated here, no evidence of hemorrhage or acute infarct, no large vessel occlusion or significant stenosis. MRI without infarction.    Impression: Suspect stuttering TIA as etiology of symptoms, possibly small vessel disease. Intermittent fever of unknown origin.  Headache better. Low suspicion of meningitis at this time.    NEURO:   -Neurologically w/o acute hemorrhage.   - MR brain w/wo , MR venogram evaluate for infectious, thrombotic source   -eeg/crp  -eeg spot - unrevealing   -Continue close monitoring for neurologic deterioration    - Stroke neuro checks q4hrs    -SBP goal of normotension    -ANTITHROMBOTIC THERAPY: s/p Plavix load 300mg (11/4/2024), c/w 81mg ASA and plavix 75mg daily for anticipated 21 days followed by ASA 81mg monotherapy there after.   -titrate statin to LDL goal less than 70; continue 80mg atorvastatin, monitor LFT and lipid profile, titrate accordingly   -MRI Brain w/o as above  -ESR - 53 11/06  -Dysphagia screen: passed, advance as tolerated per protocol  -Physical therapy/OT/Speech eval/treatment.       CARDIOVASCULAR:   -TTE noted as above   - ECG  NSR  -cardiac monitoring w/ telemetry for now, further evaluation pending findings of noted workup                             HEMATOLOGY:  - H/H without acute change, plt 367  -patient should have all age and risk appropriate malignancy screenings with PCP or sooner if clinically suspected   -LE duplex without evidence of DVT in either extremity   -CT CAP pending to r/o malignancy as well   -DVT ppx: LMWH     PULMONARY:   - CXR without acute disease noted   -protecting airway, saturating well   -encourage mobility and incentive spirometry as tolerated     RENAL:   -BUN/Cr 8.9/0.64, monitor urine output, maintain adequate hydration    -Mild hyponatremia, IV fluid bolus given; trend lytes, hypokalemia- replete accordingly   -Na Goal:  135-145    ID:   -remains intermittently febrile   -abx ointment for opthalmologic infectious process w/ evolving conjunctivitis   -monitor for si/sx of infection   -infectious workup in progress  -UA without gross UTI, RVP negative   -CT CAP pending   -Blood cx NGTD, repeat in progress    ENDOCRINE:  #Poorly controlled DM1, Hypothyroidism  -A1C=8.9, admelog, lantus, sliding scale   -levothyroxine for hypothyroid     Psych: Tobacco use   -Counseled on smoking cessation , education and counselling performed.   -States she will quit.    -nicotine patch as needed     OTHER:    -condition and plan of care d/w patient, spouse at request, questions and concerns addressed.   -nutrition follow up   -aggressive risk factor control     DISPOSITION: Anticipate home once workup complete.       CORE MEASURES:        Admission NIHSS: 1     Tenecteplase : [] YES [x] NO      LDL/HDL/A1C: Lipid panel pending/8.9     Depression Screen- if depression hx and/or present      Statin Therapy: Atorvastatin 80mg daily      Dysphagia Screen: [x] PASS [] FAIL     Smoking [x] YES [] NO      Afib [] YES [x] NO     Stroke Education [] YES [] NO [x] pending      INCOMPLETE NOTE ******************************************************************  59 year old female, PMHx HTN, HLD, T1DM, hypothyroidism, current tobacco use (~1ppd for 20 years) presented to [facility] ED 11/4/24 for complaint of slurred speech and right sided weakness. Last known well was 2AM that morning when patient went to sleep; she awoke at 6AM complaining of right upper and lower extremity weakness, as well as word-finding difficulty. Symptoms resolved after about 20 minutes, however recurred again at 8AM and then gradually resolved. Brought in by [place] mobile stroke unit, imaging obtained prior to arrival reportedly negative for any acute abnormalities. Asymptomatic upon ED arrival. CT head, CTA head/neck repeated here, no evidence of hemorrhage or acute infarct, no large vessel occlusion or significant stenosis. MRI without infarction.    Impression: Suspect stuttering TIA as etiology of symptoms, possibly small vessel disease. Intermittent fever of unknown origin.  Headache better. Low suspicion of meningitis at this time.    NEURO:   -Neurologically w/o acute hemorrhage.   - MR brain w/wo , MR venogram evaluate for infectious, thrombotic source   -eeg/crp  -eeg spot - unrevealing   -Continue close monitoring for neurologic deterioration    - Stroke neuro checks q4hrs    -SBP goal of normotension    -ANTITHROMBOTIC THERAPY: s/p Plavix load 300mg (11/4/2024), c/w 81mg ASA and plavix 75mg daily for anticipated 21 days followed by ASA 81mg monotherapy there after.   -titrate statin to LDL goal less than 70; continue 80mg atorvastatin, monitor LFT and lipid profile, titrate accordingly   -MRI Brain w/o as above  -Repeat MRI brain pending   -ESR - 53 11/06  -Dysphagia screen: passed, advance as tolerated per protocol  -Physical therapy/OT/Speech eval/treatment.       CARDIOVASCULAR:   -TTE noted as above   - ECG  NSR  -cardiac monitoring w/ telemetry for now, further evaluation pending findings of noted workup                             HEMATOLOGY:  - H/H without acute change, plt 367  -patient should have all age and risk appropriate malignancy screenings with PCP or sooner if clinically suspected   -LE duplex without evidence of DVT in either extremity   -CT CAP pending to r/o malignancy as well   -DVT ppx: LMWH     PULMONARY:   - CXR without acute disease noted   -protecting airway, saturating well   -encourage mobility and incentive spirometry as tolerated     RENAL:   -BUN/Cr 8.9/0.64, monitor urine output, maintain adequate hydration    -Mild hyponatremia, IV fluid bolus given; trend lytes, hypokalemia- replete accordingly   -Na Goal:  135-145    ID:   -remains intermittently febrile   -abx ointment for opthalmologic infectious process w/ evolving conjunctivitis   -monitor for si/sx of infection   -infectious workup in progress  -UA without gross UTI, RVP negative   -CT CAP pending   -Blood cx NGTD, repeat in progress    ENDOCRINE:  #Poorly controlled DM1, Hypothyroidism  -A1C=8.9, admelog, lantus, sliding scale   -levothyroxine for hypothyroid     Psych: Tobacco use   -Counseled on smoking cessation , education and counselling performed.   -States she will quit.    -nicotine patch as needed     OTHER:    -condition and plan of care d/w patient, spouse at request, questions and concerns addressed.   -nutrition follow up   -aggressive risk factor control     DISPOSITION: Anticipate home once workup complete.       CORE MEASURES:        Admission NIHSS: 1     Tenecteplase : [] YES [x] NO      LDL/HDL/A1C: Lipid panel pending/8.9     Depression Screen- if depression hx and/or present      Statin Therapy: Atorvastatin 80mg daily      Dysphagia Screen: [x] PASS [] FAIL     Smoking [x] YES [] NO      Afib [] YES [x] NO     Stroke Education [] YES [] NO [x] pending        59 year old female, PMHx HTN, HLD, T1DM, hypothyroidism, current tobacco use (~1ppd for 20 years) presented to [facility] ED 11/4/24 for complaint of slurred speech and right sided weakness. Last known well was 2AM that morning when patient went to sleep; she awoke at 6AM complaining of right upper and lower extremity weakness, as well as word-finding difficulty. Symptoms resolved after about 20 minutes, however recurred again at 8AM and then gradually resolved. Brought in by [place] mobile stroke unit, imaging obtained prior to arrival reportedly negative for any acute abnormalities. Asymptomatic upon ED arrival. CT head, CTA head/neck repeated here, no evidence of hemorrhage or acute infarct, no large vessel occlusion or significant stenosis. MRI without infarction.    Impression: Suspect stuttering TIA as etiology of symptoms, possibly small vessel disease. Intermittent fever of unknown origin.  Headache now resolved. Low suspicion of meningitis at this time- no nuchal.    NEURO:   -Neurologically w/o acute hemorrhage.   - MR brain w/wo , MR venogram discontinued at this time, headache resolved. Further evaluation pending findings of noted workup.   -eeg/crp elevated   -eeg spot - unrevealing   -Continue close monitoring for neurologic deterioration    - Stroke neuro checks q4hrs    -SBP goal of normotension    -ANTITHROMBOTIC THERAPY: s/p Plavix load 300mg (11/4/2024), c/w 81mg ASA and plavix 75mg daily for anticipated 21 days followed by ASA 81mg monotherapy there after.   -titrate statin to LDL goal less than 70; continue 80mg atorvastatin, monitor LFT and lipid profile, titrate accordingly   -MRI Brain w/o as above  -Dysphagia screen: passed, advance as tolerated per protocol  -Physical therapy/OT/Speech eval/treatment.       CARDIOVASCULAR:   -TTE noted as above   - ECG  NSR  -cardiac monitoring w/ telemetry for now, further evaluation pending findings of noted workup                             HEMATOLOGY:  - H/H without acute change, plt 367  -patient should have all age and risk appropriate malignancy screenings with PCP or sooner if clinically suspected   -LE duplex without evidence of DVT in either extremity   -CT CAP as noted   -Suggest heme/onc evaluation   -tumor markers  -Trend ESR, CRP  -consider rheumatology evaluation as well   -DVT ppx: LMWH     PULMONARY:   - CXR without acute disease noted   -protecting airway, saturating well   -encourage mobility and incentive spirometry as tolerated   -pulmonary evaluation     RENAL:   -BUN/Cr 8.9/0.64, monitor urine output, maintain adequate hydration    -Mild hyponatremia resolved   -Na Goal:  135-145    ID:   -remains intermittently febrile   -ID consult   -abx ointment for opthalmologic infectious process w/ improving conjunctivitis   -monitor for si/sx of infection   -infectious workup in progress  -UA without gross UTI, RVP negative x 2   -CT CAP as noted above   -Blood cx NGTD     ENDOCRINE:  #Poorly controlled DM1, Hypothyroidism  -A1C=8.9, admelog, lantus, sliding scale   -levothyroxine for hypothyroid     Psych: Tobacco use   -Counseled on smoking cessation , education and counselling performed.   -States she will quit.    -nicotine patch as needed     OTHER:    -condition and plan of care d/w patient,  questions and concerns addressed.   -nutrition follow up   -aggressive risk factor control     DISPOSITION: Anticipate home once workup complete. Patient to have ongoing continuation of care for fever of unknown origin with transition of care to Dr. Kumar whom accepted Ms. Guido.  Please call with questions or concerns as stroke team remains available.       CORE MEASURES:        Admission NIHSS: 1     Tenecteplase : [] YES [x] NO      LDL/HDL/A1C: Lipid panel pending/8.9     Depression Screen- if depression hx and/or present      Statin Therapy: Atorvastatin 80mg daily      Dysphagia Screen: [x] PASS [] FAIL     Smoking [x] YES [] NO      Afib [] YES [x] NO     Stroke Education [] YES [] NO [x] pending        59 year old female, PMHx HTN, HLD, T1DM, hypothyroidism, current tobacco use (~1ppd for 20 years) presented to [facility] ED 11/4/24 for complaint of slurred speech and right sided weakness. Last known well was 2AM that morning when patient went to sleep; she awoke at 6AM complaining of right upper and lower extremity weakness, as well as word-finding difficulty. Symptoms resolved after about 20 minutes, however recurred again at 8AM and then gradually resolved. Brought in by [place] mobile stroke unit, imaging obtained prior to arrival reportedly negative for any acute abnormalities. Asymptomatic upon ED arrival. CT head, CTA head/neck repeated here, no evidence of hemorrhage or acute infarct, no large vessel occlusion or significant stenosis. MRI without infarction.    Impression: Suspect stuttering TIA as etiology of symptoms, possibly small vessel disease. Intermittent fever of unknown origin.  Headache now resolved. Low suspicion of meningitis at this time- no nuchal rigidity. EEG neg.      NEURO:   -Neurologically w/o acute hemorrhage.   - MR brain w/wo , MR venogram discontinued at this time, headache resolved. Further evaluation pending findings of noted workup.   -eeg/crp elevated   -eeg spot - unrevealing   -Continue close monitoring for neurologic deterioration    - Stroke neuro checks q4hrs    -SBP goal of normotension    -ANTITHROMBOTIC THERAPY: s/p Plavix load 300mg (11/4/2024), c/w 81mg ASA and plavix 75mg daily for anticipated 21 days followed by ASA 81mg monotherapy there after.   -titrate statin to LDL goal less than 70; continue 80mg atorvastatin, monitor LFT and lipid profile, titrate accordingly   -MRI Brain w/o as above  -Dysphagia screen: passed, advance as tolerated per protocol  -Physical therapy/OT/Speech eval/treatment.       CARDIOVASCULAR:   -TTE noted as above   - ECG  NSR  -cardiac monitoring w/ telemetry for now, further evaluation pending findings of noted workup                             HEMATOLOGY:  - H/H without acute change, plt 367  -patient should have all age and risk appropriate malignancy screenings with PCP or sooner if clinically suspected   -LE duplex without evidence of DVT in either extremity   -CT CAP as noted   -Suggest heme/onc evaluation   -tumor markers  -Trend ESR, CRP  -consider rheumatology evaluation as well   -DVT ppx: LMWH     PULMONARY:   - CXR without acute disease noted   -protecting airway, saturating well   -encourage mobility and incentive spirometry as tolerated   -pulmonary evaluation     RENAL:   -BUN/Cr 8.9/0.64, monitor urine output, maintain adequate hydration    -Mild hyponatremia resolved   -Na Goal:  135-145    ID:   -remains intermittently febrile   -ID consult   -abx ointment for opthalmologic infectious process w/ improving conjunctivitis   -monitor for si/sx of infection   -infectious workup in progress  -UA without gross UTI, RVP negative x 2   -CT CAP as noted above   -Blood cx NGTD     ENDOCRINE:  #Poorly controlled DM1, Hypothyroidism  -A1C=8.9, admelog, lantus, sliding scale   -levothyroxine for hypothyroid     Psych: Tobacco use   -Counseled on smoking cessation , education and counselling performed.   -States she will quit.    -nicotine patch as needed     OTHER:    -condition and plan of care d/w patient,  questions and concerns addressed.   -nutrition follow up   -aggressive risk factor control     DISPOSITION: Anticipate home once workup complete. Patient to have ongoing continuation of care for fever of unknown origin with transition of care to Dr. Kumar whom accepted Ms. Guido.  Please call with questions or concerns as stroke team remains available.       CORE MEASURES:        Admission NIHSS: 1     Tenecteplase : [] YES [x] NO      LDL/HDL/A1C: Lipid panel pending/8.9     Depression Screen- if depression hx and/or present      Statin Therapy: Atorvastatin 80mg daily      Dysphagia Screen: [x] PASS [] FAIL     Smoking [x] YES [] NO      Afib [] YES [x] NO     Stroke Education [] YES [] NO [x] pending

## 2024-11-07 NOTE — PROGRESS NOTE ADULT - SUBJECTIVE AND OBJECTIVE BOX
INTERVAL EVENTS:  Follow up diabetes management. FS 95 this morning, pt reports no acute complaints.     MEDICATIONS  (STANDING):  aspirin enteric coated 81 milliGRAM(s) Oral daily  atorvastatin 80 milliGRAM(s) Oral at bedtime  clopidogrel Tablet 75 milliGRAM(s) Oral daily  dextrose 5%. 1000 milliLiter(s) (100 mL/Hr) IV Continuous <Continuous>  dextrose 5%. 1000 milliLiter(s) (50 mL/Hr) IV Continuous <Continuous>  dextrose 50% Injectable 25 Gram(s) IV Push once  dextrose 50% Injectable 12.5 Gram(s) IV Push once  dextrose 50% Injectable 25 Gram(s) IV Push once  enoxaparin Injectable 40 milliGRAM(s) SubCutaneous every 24 hours  erythromycin   Ointment 1 Application(s) Left EYE three times a day  glucagon  Injectable 1 milliGRAM(s) IntraMuscular once  influenza   Vaccine 0.5 milliLiter(s) IntraMuscular once  insulin glargine Injectable (LANTUS) 30 Unit(s) SubCutaneous at bedtime  insulin lispro (ADMELOG) corrective regimen sliding scale   SubCutaneous three times a day before meals  insulin lispro Injectable (ADMELOG) 8 Unit(s) SubCutaneous three times a day before meals  levothyroxine 137 MICROGram(s) Oral daily  methocarbamol 750 milliGRAM(s) Oral daily  nicotine - 21 mG/24Hr(s) Patch 1 Patch Transdermal daily  sodium chloride 0.9%. 1000 milliLiter(s) (75 mL/Hr) IV Continuous <Continuous>    MEDICATIONS  (PRN):  dextrose Oral Gel 15 Gram(s) Oral once PRN Blood Glucose LESS THAN 70 milliGRAM(s)/deciliter    Allergies  No Known Allergies    Vital Signs Last 24 Hrs  T(C): 37.1 (07 Nov 2024 08:51), Max: 38.2 (06 Nov 2024 19:17)  T(F): 98.7 (07 Nov 2024 08:51), Max: 100.7 (06 Nov 2024 19:17)  HR: 77 (07 Nov 2024 08:51) (67 - 87)  BP: 99/61 (07 Nov 2024 08:51) (99/61 - 115/68)  BP(mean): --  RR: 18 (07 Nov 2024 08:51) (18 - 18)  SpO2: 96% (07 Nov 2024 08:51) (93% - 98%)    Parameters below as of 07 Nov 2024 08:51  Patient On (Oxygen Delivery Method): room air    PHYSICAL EXAM:  General: No apparent distress  Respiratory: Lungs clear bilaterally  Cardiac: +S1, S2, no m/r/g  GI: +BS, soft, non tender, non distended  Extremities: No peripheral edema  Neuro: A+O X3    LABS:                        12.1   10.02 )-----------( 367      ( 07 Nov 2024 04:12 )             33.7     11-07    136  |  102  |  8.9  ----------------------------<  158[H]  3.6   |  24.0  |  0.64    Ca    8.1[L]      07 Nov 2024 04:12  Phos  2.6     11-07  Mg     2.0     11-07    TPro  5.8[L]  /  Alb  3.2[L]  /  TBili  0.3[L]  /  DBili  0.1  /  AST  14  /  ALT  11  /  AlkPhos  91  11-05    Urinalysis Basic - ( 07 Nov 2024 04:12 )    Color: x / Appearance: x / SG: x / pH: x  Gluc: 158 mg/dL / Ketone: x  / Bili: x / Urobili: x   Blood: x / Protein: x / Nitrite: x   Leuk Esterase: x / RBC: x / WBC x   Sq Epi: x / Non Sq Epi: x / Bacteria: x    POCT Blood Glucose.: 95 mg/dL (11-07-24 @ 08:45)  POCT Blood Glucose.: 213 mg/dL (11-06-24 @ 21:34)  POCT Blood Glucose.: 148 mg/dL (11-06-24 @ 16:47)  POCT Blood Glucose.: 164 mg/dL (11-06-24 @ 11:56)    Thyroid Stimulating Hormone, Serum: 0.76 uIU/mL (11-06-24 @ 04:00)  Free Thyroxine, Serum: 1.3 ng/dL (11-06-24 @ 04:00)  Triiodothyronine, Total (T3 Total): 51 ng/dL (11-05-24 @ 13:00)  Thyroid Stimulating Hormone, Serum: 0.46 uIU/mL (11-05-24 @ 13:00)

## 2024-11-08 LAB
AFP-TM SERPL-MCNC: <1.8 NG/ML — SIGNIFICANT CHANGE UP
ALBUMIN SERPL ELPH-MCNC: 3 G/DL — LOW (ref 3.3–5.2)
ALP SERPL-CCNC: 72 U/L — SIGNIFICANT CHANGE UP (ref 40–120)
ALT FLD-CCNC: 14 U/L — SIGNIFICANT CHANGE UP
ANION GAP SERPL CALC-SCNC: 13 MMOL/L — SIGNIFICANT CHANGE UP (ref 5–17)
AST SERPL-CCNC: 20 U/L — SIGNIFICANT CHANGE UP
BILIRUB SERPL-MCNC: 0.4 MG/DL — SIGNIFICANT CHANGE UP (ref 0.4–2)
BUN SERPL-MCNC: 5.8 MG/DL — LOW (ref 8–20)
CALCIUM SERPL-MCNC: 8.5 MG/DL — SIGNIFICANT CHANGE UP (ref 8.4–10.5)
CANCER AG125 SERPL-ACNC: 10 U/ML — SIGNIFICANT CHANGE UP
CANCER AG19-9 SERPL-ACNC: 5 U/ML — SIGNIFICANT CHANGE UP
CEA SERPL-MCNC: 2.2 NG/ML — SIGNIFICANT CHANGE UP (ref 0–3.8)
CHLORIDE SERPL-SCNC: 102 MMOL/L — SIGNIFICANT CHANGE UP (ref 96–108)
CO2 SERPL-SCNC: 22 MMOL/L — SIGNIFICANT CHANGE UP (ref 22–29)
CREAT SERPL-MCNC: 0.6 MG/DL — SIGNIFICANT CHANGE UP (ref 0.5–1.3)
EGFR: 103 ML/MIN/1.73M2 — SIGNIFICANT CHANGE UP
GLUCOSE BLDC GLUCOMTR-MCNC: 224 MG/DL — HIGH (ref 70–99)
GLUCOSE BLDC GLUCOMTR-MCNC: 226 MG/DL — HIGH (ref 70–99)
GLUCOSE BLDC GLUCOMTR-MCNC: 298 MG/DL — HIGH (ref 70–99)
GLUCOSE BLDC GLUCOMTR-MCNC: 305 MG/DL — HIGH (ref 70–99)
GLUCOSE BLDC GLUCOMTR-MCNC: 94 MG/DL — SIGNIFICANT CHANGE UP (ref 70–99)
GLUCOSE SERPL-MCNC: 144 MG/DL — HIGH (ref 70–99)
HCT VFR BLD CALC: 35.1 % — SIGNIFICANT CHANGE UP (ref 34.5–45)
HGB BLD-MCNC: 12.5 G/DL — SIGNIFICANT CHANGE UP (ref 11.5–15.5)
MAGNESIUM SERPL-MCNC: 2.2 MG/DL — SIGNIFICANT CHANGE UP (ref 1.6–2.6)
MCHC RBC-ENTMCNC: 30.9 PG — SIGNIFICANT CHANGE UP (ref 27–34)
MCHC RBC-ENTMCNC: 35.6 G/DL — SIGNIFICANT CHANGE UP (ref 32–36)
MCV RBC AUTO: 86.9 FL — SIGNIFICANT CHANGE UP (ref 80–100)
PHOSPHATE SERPL-MCNC: 2.9 MG/DL — SIGNIFICANT CHANGE UP (ref 2.4–4.7)
PLATELET # BLD AUTO: 404 K/UL — HIGH (ref 150–400)
POTASSIUM SERPL-MCNC: 4 MMOL/L — SIGNIFICANT CHANGE UP (ref 3.5–5.3)
POTASSIUM SERPL-SCNC: 4 MMOL/L — SIGNIFICANT CHANGE UP (ref 3.5–5.3)
PROCALCITONIN SERPL-MCNC: 0.09 NG/ML — SIGNIFICANT CHANGE UP (ref 0.02–0.1)
PROT SERPL-MCNC: 6.1 G/DL — LOW (ref 6.6–8.7)
RBC # BLD: 4.04 M/UL — SIGNIFICANT CHANGE UP (ref 3.8–5.2)
RBC # FLD: 12.2 % — SIGNIFICANT CHANGE UP (ref 10.3–14.5)
RHEUMATOID FACT SERPL-ACNC: <10 IU/ML — SIGNIFICANT CHANGE UP (ref 0–13)
SODIUM SERPL-SCNC: 137 MMOL/L — SIGNIFICANT CHANGE UP (ref 135–145)
WBC # BLD: 11.05 K/UL — HIGH (ref 3.8–10.5)
WBC # FLD AUTO: 11.05 K/UL — HIGH (ref 3.8–10.5)

## 2024-11-08 PROCEDURE — 99232 SBSQ HOSP IP/OBS MODERATE 35: CPT

## 2024-11-08 RX ORDER — FUROSEMIDE 40 MG
20 TABLET ORAL ONCE
Refills: 0 | Status: COMPLETED | OUTPATIENT
Start: 2024-11-08 | End: 2024-11-08

## 2024-11-08 RX ORDER — METHOCARBAMOL 500 MG/1
750 TABLET ORAL THREE TIMES A DAY
Refills: 0 | Status: DISCONTINUED | OUTPATIENT
Start: 2024-11-08 | End: 2024-11-10

## 2024-11-08 RX ORDER — INSULIN GLARGINE,HUM.REC.ANLOG 100/ML
22 VIAL (ML) SUBCUTANEOUS AT BEDTIME
Refills: 0 | Status: DISCONTINUED | OUTPATIENT
Start: 2024-11-08 | End: 2024-11-10

## 2024-11-08 RX ADMIN — ERYTHROMYCIN 1 APPLICATION(S): 5 OINTMENT OPHTHALMIC at 12:27

## 2024-11-08 RX ADMIN — Medication 2: at 12:26

## 2024-11-08 RX ADMIN — ERYTHROMYCIN 1 APPLICATION(S): 5 OINTMENT OPHTHALMIC at 05:07

## 2024-11-08 RX ADMIN — Medication 81 MILLIGRAM(S): at 12:26

## 2024-11-08 RX ADMIN — Medication 137 MICROGRAM(S): at 05:07

## 2024-11-08 RX ADMIN — Medication 4: at 18:26

## 2024-11-08 RX ADMIN — Medication 650 MILLIGRAM(S): at 18:27

## 2024-11-08 RX ADMIN — Medication 650 MILLIGRAM(S): at 08:51

## 2024-11-08 RX ADMIN — Medication 40 MILLIGRAM(S): at 07:51

## 2024-11-08 RX ADMIN — Medication 80 MILLIGRAM(S): at 21:20

## 2024-11-08 RX ADMIN — Medication 650 MILLIGRAM(S): at 07:51

## 2024-11-08 RX ADMIN — CLOPIDOGREL 75 MILLIGRAM(S): 75 TABLET ORAL at 12:25

## 2024-11-08 RX ADMIN — Medication 8 UNIT(S): at 18:26

## 2024-11-08 RX ADMIN — Medication 8 UNIT(S): at 12:26

## 2024-11-08 RX ADMIN — Medication 20 MILLIGRAM(S): at 12:25

## 2024-11-08 RX ADMIN — Medication 22 UNIT(S): at 21:20

## 2024-11-08 RX ADMIN — ERYTHROMYCIN 1 APPLICATION(S): 5 OINTMENT OPHTHALMIC at 21:21

## 2024-11-08 NOTE — PROGRESS NOTE ADULT - SUBJECTIVE AND OBJECTIVE BOX
[facility]  INFECTIOUS DISEASES at [place] / [place] / [place]  =======================================================                              Luis Adame MD                              Professor Emeritus:  Dr Don Edgar MD            Diplomates American Board of Internal Medicine & Infectious Diseases                                   Tel  [phone] Fax [phone]                                  Hospital Consult line:  [phone]  =======================================================      VINCENZO DAWSON [id number]    Follow up:      Allergies:  No Known Allergies       REVIEW OF SYSTEMS:  CONSTITUTIONAL:  No Fever or chills  HEENT:  No diplopia or blurred vision.  No earache, sore throat or runny nose.  CARDIOVASCULAR:  No chest pain  RESPIRATORY:  No cough, shortness of breath  GASTROINTESTINAL:  No nausea, vomiting or diarrhea.  GENITOURINARY:  No dysuria, frequency or urgency. No Blood in urine  MUSCULOSKELETAL:  no joint aches, no muscle pain  SKIN:  No change in skin, hair or nails.  NEUROLOGIC:  No Headaches,      Physical Exam:  GEN: NAD  HEENT: normocephalic and atraumatic. EOMI. PERRL.    NECK: Supple.   LUNGS: CTA B/L.  HEART: RRR  ABDOMEN: Soft, NT, ND.  +BS.    : No CVA tenderness  EXTREMITIES: Without  edema.  MSK: No joint swelling  NEUROLOGIC: No Focal Deficits   PSYCHIATRIC: Appropriate affect .  SKIN: No rash        Vitals:  T(F): 98.4 (08 Nov 2024 11:21), Max: 100.4 (07 Nov 2024 19:25)  HR: 72 (08 Nov 2024 11:21)  BP: 130/82 (08 Nov 2024 11:21)  RR: 18 (08 Nov 2024 11:21)  SpO2: 96% (08 Nov 2024 11:21) (92% - 97%)  temp max in last 48H T(F): , Max: 100.7 (11-06-24 @ 19:17)    Current Antibiotics:    Other medications:  aspirin enteric coated 81 milliGRAM(s) Oral daily  atorvastatin 80 milliGRAM(s) Oral at bedtime  clopidogrel Tablet 75 milliGRAM(s) Oral daily  dextrose 5%. 1000 milliLiter(s) IV Continuous <Continuous>  dextrose 5%. 1000 milliLiter(s) IV Continuous <Continuous>  dextrose 50% Injectable 12.5 Gram(s) IV Push once  dextrose 50% Injectable 25 Gram(s) IV Push once  dextrose 50% Injectable 25 Gram(s) IV Push once  enoxaparin Injectable 40 milliGRAM(s) SubCutaneous every 24 hours  erythromycin   Ointment 1 Application(s) Left EYE three times a day  glucagon  Injectable 1 milliGRAM(s) IntraMuscular once  influenza   Vaccine 0.5 milliLiter(s) IntraMuscular once  insulin glargine Injectable (LANTUS) 22 Unit(s) SubCutaneous at bedtime  insulin lispro (ADMELOG) corrective regimen sliding scale   SubCutaneous three times a day before meals  insulin lispro Injectable (ADMELOG) 8 Unit(s) SubCutaneous three times a day before meals  levothyroxine 137 MICROGram(s) Oral daily  nicotine - 21 mG/24Hr(s) Patch 1 Patch Transdermal daily                            12.5   11.05 )-----------( 404      ( 08 Nov 2024 04:20 )             35.1     11-08    137  |  102  |  5.8[L]  ----------------------------<  144[H]  4.0   |  22.0  |  0.60    Ca    8.5      08 Nov 2024 04:20  Phos  2.9     11-08  Mg     2.2     11-08    TPro  6.1[L]  /  Alb  3.0[L]  /  TBili  0.4  /  DBili  x   /  AST  20  /  ALT  14  /  AlkPhos  72  11-08    RECENT CULTURES:  11-07 @ 08:00    [facility]    11-05 @ 14:00 .Blood BLOOD     No growth at 48 Hours    11-04 @ 23:20    [facility]    11-04 @ 23:14 .Blood BLOOD     No growth at 72 Hours    11-03 @ 12:40 .Blood BLOOD     No growth at 4 days      WBC Count: 11.05 K/uL (11-08-24 @ 04:20)  WBC Count: 10.02 K/uL (11-07-24 @ 04:12)  WBC Count: 11.18 K/uL (11-06-24 @ 04:00)  WBC Count: 10.34 K/uL (11-05-24 @ 13:00)  WBC Count: 11.16 K/uL (11-04-24 @ 23:14)  WBC Count: 9.35 K/uL (11-04-24 @ 09:59)    Creatinine: 0.60 mg/dL (11-08-24 @ 04:20)  Creatinine: 0.64 mg/dL (11-07-24 @ 04:12)  Creatinine: 0.78 mg/dL (11-06-24 @ 18:12)  Creatinine: 0.58 mg/dL (11-06-24 @ 04:00)  Creatinine: 0.72 mg/dL (11-05-24 @ 13:00)  Creatinine: 0.61 mg/dL (11-04-24 @ 09:59)    C-Reactive Protein: 97 mg/L (11-06-24 @ 18:12)    Sedimentation Rate, Erythrocyte: 53 mm/hr (11-06-24 @ 18:12)    Procalcitonin: 0.09 ng/mL (11-08-24 @ 04:20)     SARS-CoV-2: NotDetec (11-07-24 @ 08:00)  SARS-CoV-2: NotDetec (11-04-24 @ 23:20)            < from: CT Abdomen and Pelvis w/ Oral Cont and w/ IV Cont (11.07.24 @ 18:51) >  ACC: [id number] EXAM:  CT ABDOMEN AND PELVIS OC IC   ORDERED BY: LEONA DUTTA     ACC: [id number] EXAM:  CT CHEST OC IC   ORDERED BY: LEONA DUTTA     PROCEDURE DATE:  11/07/2024      INTERPRETATION:  CLINICAL INFORMATION: Fever and adenopathy.    COMPARISON: 11/7/2024.    CONTRAST/COMPLICATIONS:  IV Contrast: Omnipaque 350  90 cc administered   10 cc discarded  Oral Contrast: Gastroview  Complications: None reported at time of study completion    PROCEDURE:  CT of the Chest, Abdomen and Pelvis was performed.  Sagittal and coronal reformats were performed.    FINDINGS:  CHEST:  LUNGS AND LARGE AIRWAYS: Patent central airways. Interlobular septal   thickening, consistent with a pulmonary edema pattern. Passive   atelectasis at the lung bases.  PLEURA: Small right greater than left pleural effusions.  VESSELS: Within normal limits.  HEART: Heart size is normal. No pericardial effusion.  MEDIASTINUM AND GENI: Small mediastinal lymph nodes, likely reactive.   Most prominent is precarinal measuring 1.5 x 0.7 cm.  CHEST WALL AND LOWER NECK: Within normal limits.    ABDOMEN AND PELVIS:  LIVER: Within normal limits.  BILE DUCTS: Normal caliber.  GALLBLADDER: Within normal limits.  SPLEEN: Within normal limits.  PANCREAS: Within normal limits.  ADRENALS: Within normal limits.  KIDNEYS/URETERS: Subcentimeter left renal lesion too small to   characterize, likely a cyst.    BLADDER: Within normal limits.  REPRODUCTIVE ORGANS: Myomatous uterus.    BOWEL: Small hiatal hernia. No bowel obstruction. Appendix is normal.  PERITONEUM/RETROPERITONEUM: Within normal limits.  VESSELS: Mild atherosclerotic calcifications.  LYMPH NODES: Borderline enlarged bilateral pelvic lymph nodes.  *  Left external iliac lymph node measures 1.7 x 1.1cm.  *  Right obturator lymph node measures 1.6 x 1.0 cm.  ABDOMINAL WALL: Within normal limits.  BONES: Within normal limits.    IMPRESSION:  Pulmonary edema pattern at the lung bases and small bilateral pleural   effusions.    Nonspecific mediastinal and pelvic adenopathy.    --- End of Report ---  < end of copied text >

## 2024-11-08 NOTE — PROGRESS NOTE ADULT - ASSESSMENT
59F with PMHx HTN, HLD, T1DM, hypothyroidism, current tobacco use (~1ppd for 20 years) presented to [facility] ED 11/4/24 for complaint of slurred speech and right sided weakness. Admitted to stroke service for further work up.    Has h/o DM type 1, since 25 years, a1c 8.9% on admission, at home on tresiba 36 units daily and admelog 6 to 8 units tid with meals, took last dose of tresiba , last night.    Consulted for diabetes management  Home regimen: Tresiba 36u, admelog 6-8u TID  Current a1c: 8.9%  Outpatient Endocrinologist: Dr Benz    1. Poorly controlled DM1  - FS 95 this AM, decrease lantus to 22 units qhs  - Continue admelog 8 units TID  - Correction scale  - Follow up appt: 12/30 @ 9AM with ROSALINDA Aguiar ([address] [facility])  - Pt wanted to switch providers, scheduled an appt with Dr Valadez for 4/2/25    2. Hypothyroidism  - TSH 0.76, FT4 1.3  - Continue home dose of  levothyroxine 137 mcg daily     3. TIA, rule out stroke  - Neuro checks  - C/w ASA and plavix, statin

## 2024-11-08 NOTE — PROGRESS NOTE ADULT - TIME BILLING
This includes chart review, patient assessment, discussion with Dr Feng. Antibiotic dosing and management with clinical pharmacy.

## 2024-11-08 NOTE — PROGRESS NOTE ADULT - ASSESSMENT
59 year old female, PMHx HTN, HLD, T1DM, hypothyroidism, current tobacco use (~1ppd for 20 years) presented to [facility] ED 11/4/24 for complaint of slurred speech and right sided weakness. Last known well was 2AM that morning when patient went to sleep; she awoke at 6AM complaining of right upper and lower extremity weakness, as well as word-finding difficulty. Symptoms resolved after about 20 minutes, however recurred again at 8AM and then gradually resolved. Brought in by [place] mobile stroke unit, imaging obtained prior to arrival reportedly negative for any acute abnormalities. Asymptomatic upon ED arrival. CT head, CTA head/neck repeated here, no evidence of hemorrhage or acute infarct, no large vessel occlusion or significant stenosis. Patient currently complaining of headache and neck pain (notes this issue has been ongoing for ~2months, recent hx of epidural steroid injections).  . Admitted to stroke service for further work up. negative MRI. course complicated by persistent fevers. transferred to medicine for further mgmt    TIA:  s/p Plavix load 300mg (11/4/2024), c/w 81mg ASA and plavix 75mg daily for anticipated 21 days followed by ASA 81mg monotherapy there after.     neurology signed off     c/w lipitor    recurrent fevers: unclear etiology    lasix 20mg x1 for fluid overload on CT chest although clinically w/o hypoxia, orthopnea etc.    f/u cultures;  rvp negative     monitoring off abx    incentive evelyn    dm1: endocrine following and managing insulin therapy    a1c 8.9    dc home once cleared by ID

## 2024-11-08 NOTE — PROGRESS NOTE ADULT - SUBJECTIVE AND OBJECTIVE BOX
INTERVAL EVENTS:  Follow up diabetes management. FS 94 this am, premeal admelog held, hyperglycemic at lunch time. Pt denies acute complaints.     MEDICATIONS  (STANDING):  aspirin enteric coated 81 milliGRAM(s) Oral daily  atorvastatin 80 milliGRAM(s) Oral at bedtime  clopidogrel Tablet 75 milliGRAM(s) Oral daily  dextrose 5%. 1000 milliLiter(s) (50 mL/Hr) IV Continuous <Continuous>  dextrose 5%. 1000 milliLiter(s) (100 mL/Hr) IV Continuous <Continuous>  dextrose 50% Injectable 12.5 Gram(s) IV Push once  dextrose 50% Injectable 25 Gram(s) IV Push once  dextrose 50% Injectable 25 Gram(s) IV Push once  enoxaparin Injectable 40 milliGRAM(s) SubCutaneous every 24 hours  erythromycin   Ointment 1 Application(s) Left EYE three times a day  glucagon  Injectable 1 milliGRAM(s) IntraMuscular once  influenza   Vaccine 0.5 milliLiter(s) IntraMuscular once  insulin glargine Injectable (LANTUS) 22 Unit(s) SubCutaneous at bedtime  insulin lispro (ADMELOG) corrective regimen sliding scale   SubCutaneous three times a day before meals  insulin lispro Injectable (ADMELOG) 8 Unit(s) SubCutaneous three times a day before meals  levothyroxine 137 MICROGram(s) Oral daily  nicotine - 21 mG/24Hr(s) Patch 1 Patch Transdermal daily    MEDICATIONS  (PRN):  acetaminophen     Tablet .. 650 milliGRAM(s) Oral every 6 hours PRN Temp greater or equal to 38C (100.4F), Mild Pain (1 - 3)  dextrose Oral Gel 15 Gram(s) Oral once PRN Blood Glucose LESS THAN 70 milliGRAM(s)/deciliter  methocarbamol 750 milliGRAM(s) Oral three times a day PRN Muscle Spasm    Allergies  No Known Allergies    Vital Signs Last 24 Hrs  T(C): 36.9 (08 Nov 2024 11:21), Max: 38 (07 Nov 2024 19:25)  T(F): 98.4 (08 Nov 2024 11:21), Max: 100.4 (07 Nov 2024 19:25)  HR: 72 (08 Nov 2024 11:21) (70 - 84)  BP: 130/82 (08 Nov 2024 11:21) (113/67 - 130/82)  BP(mean): --  RR: 18 (08 Nov 2024 11:21) (18 - 18)  SpO2: 96% (08 Nov 2024 11:21) (92% - 97%)    Parameters below as of 08 Nov 2024 11:21  Patient On (Oxygen Delivery Method): room air    PHYSICAL EXAM:  General: No apparent distress  Respiratory: Lungs clear bilaterally  Cardiac: +S1, S2, no m/r/g  GI: +BS, soft, non tender, non distended  Extremities: No peripheral edema  Neuro: A+O X3    LABS:                        12.5   11.05 )-----------( 404      ( 08 Nov 2024 04:20 )             35.1     11-08    137  |  102  |  5.8[L]  ----------------------------<  144[H]  4.0   |  22.0  |  0.60    Ca    8.5      08 Nov 2024 04:20  Phos  2.9     11-08  Mg     2.2     11-08    TPro  6.1[L]  /  Alb  3.0[L]  /  TBili  0.4  /  DBili  x   /  AST  20  /  ALT  14  /  AlkPhos  72  11-08    Urinalysis Basic - ( 08 Nov 2024 04:20 )    Color: x / Appearance: x / SG: x / pH: x  Gluc: 144 mg/dL / Ketone: x  / Bili: x / Urobili: x   Blood: x / Protein: x / Nitrite: x   Leuk Esterase: x / RBC: x / WBC x   Sq Epi: x / Non Sq Epi: x / Bacteria: x    POCT Blood Glucose.: 226 mg/dL (11-08-24 @ 12:16)  POCT Blood Glucose.: 94 mg/dL (11-08-24 @ 08:40)  POCT Blood Glucose.: 225 mg/dL (11-07-24 @ 21:53)  POCT Blood Glucose.: 167 mg/dL (11-07-24 @ 17:02)    Thyroid Stimulating Hormone, Serum: 0.76 uIU/mL (11-06-24 @ 04:00)  Free Thyroxine, Serum: 1.3 ng/dL (11-06-24 @ 04:00)  Triiodothyronine, Total (T3 Total): 51 ng/dL (11-05-24 @ 13:00)  Thyroid Stimulating Hormone, Serum: 0.46 uIU/mL (11-05-24 @ 13:00)

## 2024-11-08 NOTE — PROGRESS NOTE ADULT - ASSESSMENT
63-year-old female active smoker, with a history of type 1 diabetes mellitus, hypertension, hyperlipidemia.  Patient presented to [facility] emergency room on November 4, 2024 with slurred speech.  Patient was admitted by the stroke team and evaluated for stroke found to have a TIA.  Since admission has been having intermittent fevers.  No antibiotics were administered since no source of infection was found.  Patient now with persistent fevers.  Infectious diseases evaluation requested.    Fevers for 3 days in the hospital unclear if more fevers outside hospital  Lymphadenopathy      - RVP 11/7 negative   - CT Chest/A/P with contrast 11/7 reporting Nonspecific mediastinal and pelvic adenopathy.  - Procalcitonin level 0.09  - RUTH pending  - RF negative   - CT A/P without contrast reviewed  - May need lymph node biopsy  - Continue to monitor off antibiotics  - Hold off on PICC/Midline for now unless needed for reasons other than infectious diseases  - Follow up cultures  - Trend Fever  - Trend WBC      Thank you for allowing me to participate in the care of your patient.   Will Follow    Discussed treatment plan with: Dr Feng

## 2024-11-08 NOTE — PROGRESS NOTE ADULT - SUBJECTIVE AND OBJECTIVE BOX
seen for fevers, suspected TIA    seen in er holding  feels well  ros negative      MEDICATIONS  (STANDING):  aspirin enteric coated 81 milliGRAM(s) Oral daily  atorvastatin 80 milliGRAM(s) Oral at bedtime  clopidogrel Tablet 75 milliGRAM(s) Oral daily  dextrose 5%. 1000 milliLiter(s) (100 mL/Hr) IV Continuous <Continuous>  dextrose 5%. 1000 milliLiter(s) (50 mL/Hr) IV Continuous <Continuous>  dextrose 50% Injectable 12.5 Gram(s) IV Push once  dextrose 50% Injectable 25 Gram(s) IV Push once  dextrose 50% Injectable 25 Gram(s) IV Push once  enoxaparin Injectable 40 milliGRAM(s) SubCutaneous every 24 hours  erythromycin   Ointment 1 Application(s) Left EYE three times a day  furosemide   Injectable 20 milliGRAM(s) IV Push once  glucagon  Injectable 1 milliGRAM(s) IntraMuscular once  influenza   Vaccine 0.5 milliLiter(s) IntraMuscular once  insulin glargine Injectable (LANTUS) 26 Unit(s) SubCutaneous at bedtime  insulin lispro (ADMELOG) corrective regimen sliding scale   SubCutaneous three times a day before meals  insulin lispro Injectable (ADMELOG) 8 Unit(s) SubCutaneous three times a day before meals  levothyroxine 137 MICROGram(s) Oral daily  methocarbamol 750 milliGRAM(s) Oral daily  nicotine - 21 mG/24Hr(s) Patch 1 Patch Transdermal daily    MEDICATIONS  (PRN):  acetaminophen     Tablet .. 650 milliGRAM(s) Oral every 6 hours PRN Temp greater or equal to 38C (100.4F), Mild Pain (1 - 3)  dextrose Oral Gel 15 Gram(s) Oral once PRN Blood Glucose LESS THAN 70 milliGRAM(s)/deciliter      Allergies    No Known Allergies       Vital Signs Last 24 Hrs  T(C): 36.9 (08 Nov 2024 11:08), Max: 38 (07 Nov 2024 19:25)  T(F): 98.5 (08 Nov 2024 11:08), Max: 100.4 (07 Nov 2024 19:25)  HR: 79 (08 Nov 2024 07:43) (70 - 84)  BP: 129/91 (08 Nov 2024 07:43) (113/67 - 129/91)  BP(mean): --  RR: 18 (08 Nov 2024 07:43) (18 - 18)  SpO2: 94% (08 Nov 2024 07:43) (92% - 97%)    Parameters below as of 08 Nov 2024 07:43  Patient On (Oxygen Delivery Method): room air        PHYSICAL EXAM:    GENERAL: NAD,   CHEST/LUNG: Clear to ausculation bilaterally   HEART: Regular rate and rhythm; S1 S2;   ABDOMEN: Soft, Nontender,  ; Bowel sounds present  EXTREMITIES: no edema   NERVOUS SYSTEM:  Alert & Oriented X3,  Motor Strength 5/5 B/L upper and lower extremities  PSYCH: normal mood, appropriate response.    LABS:                        12.5   11.05 )-----------( 404      ( 08 Nov 2024 04:20 )             35.1     11-08    137  |  102  |  5.8[L]  ----------------------------<  144[H]  4.0   |  22.0  |  0.60    Ca    8.5      08 Nov 2024 04:20  Phos  2.9     11-08  Mg     2.2     11-08    TPro  6.1[L]  /  Alb  3.0[L]  /  TBili  0.4  /  DBili  x   /  AST  20  /  ALT  14  /  AlkPhos  72  11-08      Urinalysis Basic - ( 08 Nov 2024 04:20 )    Color: x / Appearance: x / SG: x / pH: x  Gluc: 144 mg/dL / Ketone: x  / Bili: x / Urobili: x   Blood: x / Protein: x / Nitrite: x   Leuk Esterase: x / RBC: x / WBC x   Sq Epi: x / Non Sq Epi: x / Bacteria: x        CAPILLARY BLOOD GLUCOSE      POCT Blood Glucose.: 94 mg/dL (08 Nov 2024 08:40)  POCT Blood Glucose.: 225 mg/dL (07 Nov 2024 21:53)  POCT Blood Glucose.: 167 mg/dL (07 Nov 2024 17:02)        RADIOLOGY & ADDITIONAL TESTS:

## 2024-11-09 DIAGNOSIS — R50.9 FEVER, UNSPECIFIED: ICD-10-CM

## 2024-11-09 DIAGNOSIS — M54.2 CERVICALGIA: ICD-10-CM

## 2024-11-09 DIAGNOSIS — E11.9 TYPE 2 DIABETES MELLITUS WITHOUT COMPLICATIONS: ICD-10-CM

## 2024-11-09 DIAGNOSIS — R51.9 HEADACHE, UNSPECIFIED: ICD-10-CM

## 2024-11-09 DIAGNOSIS — I10 ESSENTIAL (PRIMARY) HYPERTENSION: ICD-10-CM

## 2024-11-09 LAB
ANION GAP SERPL CALC-SCNC: 14 MMOL/L — SIGNIFICANT CHANGE UP (ref 5–17)
BUN SERPL-MCNC: 7.2 MG/DL — LOW (ref 8–20)
CALCIUM SERPL-MCNC: 8.6 MG/DL — SIGNIFICANT CHANGE UP (ref 8.4–10.5)
CHLORIDE SERPL-SCNC: 102 MMOL/L — SIGNIFICANT CHANGE UP (ref 96–108)
CO2 SERPL-SCNC: 22 MMOL/L — SIGNIFICANT CHANGE UP (ref 22–29)
CREAT SERPL-MCNC: 0.63 MG/DL — SIGNIFICANT CHANGE UP (ref 0.5–1.3)
CULTURE RESULTS: SIGNIFICANT CHANGE UP
EGFR: 101 ML/MIN/1.73M2 — SIGNIFICANT CHANGE UP
FERRITIN SERPL-MCNC: 481 NG/ML — HIGH (ref 13–330)
GLUCOSE BLDC GLUCOMTR-MCNC: 157 MG/DL — HIGH (ref 70–99)
GLUCOSE BLDC GLUCOMTR-MCNC: 260 MG/DL — HIGH (ref 70–99)
GLUCOSE BLDC GLUCOMTR-MCNC: 289 MG/DL — HIGH (ref 70–99)
GLUCOSE BLDC GLUCOMTR-MCNC: 292 MG/DL — HIGH (ref 70–99)
GLUCOSE SERPL-MCNC: 153 MG/DL — HIGH (ref 70–99)
LDH SERPL L TO P-CCNC: 195 U/L — HIGH (ref 98–192)
MAGNESIUM SERPL-MCNC: 2.2 MG/DL — SIGNIFICANT CHANGE UP (ref 1.8–2.6)
POTASSIUM SERPL-MCNC: 4.1 MMOL/L — SIGNIFICANT CHANGE UP (ref 3.5–5.3)
POTASSIUM SERPL-SCNC: 4.1 MMOL/L — SIGNIFICANT CHANGE UP (ref 3.5–5.3)
SODIUM SERPL-SCNC: 138 MMOL/L — SIGNIFICANT CHANGE UP (ref 135–145)
SPECIMEN SOURCE: SIGNIFICANT CHANGE UP

## 2024-11-09 PROCEDURE — 99232 SBSQ HOSP IP/OBS MODERATE 35: CPT

## 2024-11-09 RX ORDER — INSULIN LISPRO 100/ML
10 VIAL (ML) SUBCUTANEOUS
Refills: 0 | Status: DISCONTINUED | OUTPATIENT
Start: 2024-11-09 | End: 2024-11-10

## 2024-11-09 RX ORDER — PROCHLORPERAZINE MALEATE 5 MG/1
10 TABLET ORAL ONCE
Refills: 0 | Status: COMPLETED | OUTPATIENT
Start: 2024-11-09 | End: 2024-11-09

## 2024-11-09 RX ADMIN — PROCHLORPERAZINE MALEATE 10 MILLIGRAM(S): 5 TABLET ORAL at 20:34

## 2024-11-09 RX ADMIN — ERYTHROMYCIN 1 APPLICATION(S): 5 OINTMENT OPHTHALMIC at 11:33

## 2024-11-09 RX ADMIN — Medication 650 MILLIGRAM(S): at 20:34

## 2024-11-09 RX ADMIN — Medication 81 MILLIGRAM(S): at 11:32

## 2024-11-09 RX ADMIN — Medication 137 MICROGRAM(S): at 05:36

## 2024-11-09 RX ADMIN — PROCHLORPERAZINE MALEATE 10 MILLIGRAM(S): 5 TABLET ORAL at 11:39

## 2024-11-09 RX ADMIN — Medication 1: at 07:51

## 2024-11-09 RX ADMIN — Medication 650 MILLIGRAM(S): at 00:16

## 2024-11-09 RX ADMIN — Medication 650 MILLIGRAM(S): at 01:00

## 2024-11-09 RX ADMIN — Medication 40 MILLIGRAM(S): at 07:50

## 2024-11-09 RX ADMIN — Medication 650 MILLIGRAM(S): at 21:30

## 2024-11-09 RX ADMIN — Medication 650 MILLIGRAM(S): at 09:18

## 2024-11-09 RX ADMIN — Medication 10 UNIT(S): at 11:30

## 2024-11-09 RX ADMIN — Medication 10 UNIT(S): at 17:03

## 2024-11-09 RX ADMIN — CLOPIDOGREL 75 MILLIGRAM(S): 75 TABLET ORAL at 11:32

## 2024-11-09 RX ADMIN — Medication 3: at 11:29

## 2024-11-09 RX ADMIN — Medication 8 UNIT(S): at 07:52

## 2024-11-09 RX ADMIN — ERYTHROMYCIN 1 APPLICATION(S): 5 OINTMENT OPHTHALMIC at 05:36

## 2024-11-09 RX ADMIN — Medication 3: at 17:02

## 2024-11-09 RX ADMIN — Medication 22 UNIT(S): at 21:21

## 2024-11-09 RX ADMIN — Medication 80 MILLIGRAM(S): at 21:22

## 2024-11-09 RX ADMIN — ERYTHROMYCIN 1 APPLICATION(S): 5 OINTMENT OPHTHALMIC at 21:23

## 2024-11-09 NOTE — PROGRESS NOTE ADULT - SUBJECTIVE AND OBJECTIVE BOX
VINCENZO DAWSON 59y Female  MRN#: [id number]         SUBJECTIVE  Patient is a 59y old Female who presents with a chief complaint of Concern for stroke (08 Nov 2024 12:48)  Currently admitted to medicine with the primary diagnosis of TIA (transient ischemic attack)    Today is hospital day 5d, and this morning she is _________ and reports no overnight events.       OBJECTIVE  PAST MEDICAL & SURGICAL HISTORY  Diabetes    HTN (hypertension)    Hypothyroidism, adult    High cholesterol    No significant past surgical history      Home Meds:  ezetimibe 10 mg oral tablet: 1 tab(s) orally once a day  hydroCHLOROthiazide 25 mg oral tablet: 1 tab(s) orally once a day  icosapent 1 g oral capsule: 2 cap(s) orally 2 times a day  insulin degludec 100 units/mL subcutaneous solution: 36 international unit(s) subcutaneous once a day (at bedtime)  insulin lispro 100 units/mL subcutaneous solution: 6-8units subcutaneous 3 times a day before meals via sliding scale  levothyroxine 137 mcg (0.137 mg) oral tablet: 1 tab(s) orally once a day  losartan 50 mg oral tablet: 1 tab(s) orally once a day    ALLERGIES:  No Known Allergies    MEDICATIONS:  STANDING MEDICATIONS  aspirin enteric coated 81 milliGRAM(s) Oral daily  atorvastatin 80 milliGRAM(s) Oral at bedtime  clopidogrel Tablet 75 milliGRAM(s) Oral daily  dextrose 5%. 1000 milliLiter(s) IV Continuous <Continuous>  dextrose 5%. 1000 milliLiter(s) IV Continuous <Continuous>  dextrose 50% Injectable 12.5 Gram(s) IV Push once  dextrose 50% Injectable 25 Gram(s) IV Push once  dextrose 50% Injectable 25 Gram(s) IV Push once  enoxaparin Injectable 40 milliGRAM(s) SubCutaneous every 24 hours  erythromycin   Ointment 1 Application(s) Left EYE three times a day  glucagon  Injectable 1 milliGRAM(s) IntraMuscular once  influenza   Vaccine 0.5 milliLiter(s) IntraMuscular once  insulin glargine Injectable (LANTUS) 22 Unit(s) SubCutaneous at bedtime  insulin lispro (ADMELOG) corrective regimen sliding scale   SubCutaneous three times a day before meals  insulin lispro Injectable (ADMELOG) 8 Unit(s) SubCutaneous three times a day before meals  levothyroxine 137 MICROGram(s) Oral daily  nicotine - 21 mG/24Hr(s) Patch 1 Patch Transdermal daily    PRN MEDICATIONS  acetaminophen     Tablet .. 650 milliGRAM(s) Oral every 6 hours PRN  dextrose Oral Gel 15 Gram(s) Oral once PRN  methocarbamol 750 milliGRAM(s) Oral three times a day PRN      VITAL SIGNS: Last 24 Hours  T(C): 37.3 (09 Nov 2024 04:46), Max: 38.5 (08 Nov 2024 18:43)  T(F): 99.2 (09 Nov 2024 04:46), Max: 101.3 (08 Nov 2024 18:43)  HR: 66 (09 Nov 2024 04:46) (66 - 81)  BP: 116/62 (09 Nov 2024 04:46) (116/62 - 139/74)  BP(mean): --  RR: 18 (09 Nov 2024 04:46) (17 - 18)  SpO2: 92% (09 Nov 2024 04:46) (92% - 96%)    LABS:                        12.5   11.05 )-----------( 404      ( 08 Nov 2024 04:20 )             35.1     11-09    138  |  102  |  7.2[L]  ----------------------------<  153[H]  4.1   |  22.0  |  0.63    Ca    8.6      09 Nov 2024 06:00  Phos  2.9     11-08  Mg     2.2     11-09    TPro  6.1[L]  /  Alb  3.0[L]  /  TBili  0.4  /  DBili  x   /  AST  20  /  ALT  14  /  AlkPhos  72  11-08        RADIOLOGY:  Reviewed     PHYSICAL EXAM:  General: NAD, AAOx3  HEENT: atraumatic, normocephalic  Pulmonary: clear to auscultation bilaterally; No wheeze  Cardiovascular: Regular rate and rhythm; no murmurs, rubs or gallops. Normal S1S2  Gastrointestinal: Soft, nontender, nondistended; bowel sounds present  Musculoskeletal: 2+ peripheral pulses, no clubbing, cyanosis or edema  Neurology: Pt. alert and oriented, fluent speech, able to move all extremities  Skin: no rashes or lesions             VINCENZO DAWSON 59y Female  MRN#: [id number]         SUBJECTIVE  Patient is a 59y old Female who presents with a chief complaint of Concern for stroke (08 Nov 2024 12:48)  Currently admitted to medicine with the primary diagnosis of TIA (transient ischemic attack)    Today is hospital day 5d, and this morning she is complaining of a headache, band like, burning in nature, has been occurring on and off for 2 months, and reports no overnight events.       OBJECTIVE  PAST MEDICAL & SURGICAL HISTORY  Diabetes    HTN (hypertension)    Hypothyroidism, adult    High cholesterol    No significant past surgical history      Home Meds:  ezetimibe 10 mg oral tablet: 1 tab(s) orally once a day  hydroCHLOROthiazide 25 mg oral tablet: 1 tab(s) orally once a day  icosapent 1 g oral capsule: 2 cap(s) orally 2 times a day  insulin degludec 100 units/mL subcutaneous solution: 36 international unit(s) subcutaneous once a day (at bedtime)  insulin lispro 100 units/mL subcutaneous solution: 6-8units subcutaneous 3 times a day before meals via sliding scale  levothyroxine 137 mcg (0.137 mg) oral tablet: 1 tab(s) orally once a day  losartan 50 mg oral tablet: 1 tab(s) orally once a day    ALLERGIES:  No Known Allergies    MEDICATIONS:  STANDING MEDICATIONS  aspirin enteric coated 81 milliGRAM(s) Oral daily  atorvastatin 80 milliGRAM(s) Oral at bedtime  clopidogrel Tablet 75 milliGRAM(s) Oral daily  dextrose 5%. 1000 milliLiter(s) IV Continuous <Continuous>  dextrose 5%. 1000 milliLiter(s) IV Continuous <Continuous>  dextrose 50% Injectable 12.5 Gram(s) IV Push once  dextrose 50% Injectable 25 Gram(s) IV Push once  dextrose 50% Injectable 25 Gram(s) IV Push once  enoxaparin Injectable 40 milliGRAM(s) SubCutaneous every 24 hours  erythromycin   Ointment 1 Application(s) Left EYE three times a day  glucagon  Injectable 1 milliGRAM(s) IntraMuscular once  influenza   Vaccine 0.5 milliLiter(s) IntraMuscular once  insulin glargine Injectable (LANTUS) 22 Unit(s) SubCutaneous at bedtime  insulin lispro (ADMELOG) corrective regimen sliding scale   SubCutaneous three times a day before meals  insulin lispro Injectable (ADMELOG) 8 Unit(s) SubCutaneous three times a day before meals  levothyroxine 137 MICROGram(s) Oral daily  nicotine - 21 mG/24Hr(s) Patch 1 Patch Transdermal daily    PRN MEDICATIONS  acetaminophen     Tablet .. 650 milliGRAM(s) Oral every 6 hours PRN  dextrose Oral Gel 15 Gram(s) Oral once PRN  methocarbamol 750 milliGRAM(s) Oral three times a day PRN      VITAL SIGNS: Last 24 Hours  T(C): 37.3 (09 Nov 2024 04:46), Max: 38.5 (08 Nov 2024 18:43)  T(F): 99.2 (09 Nov 2024 04:46), Max: 101.3 (08 Nov 2024 18:43)  HR: 66 (09 Nov 2024 04:46) (66 - 81)  BP: 116/62 (09 Nov 2024 04:46) (116/62 - 139/74)  BP(mean): --  RR: 18 (09 Nov 2024 04:46) (17 - 18)  SpO2: 92% (09 Nov 2024 04:46) (92% - 96%)    LABS:                        12.5   11.05 )-----------( 404      ( 08 Nov 2024 04:20 )             35.1     11-09    138  |  102  |  7.2[L]  ----------------------------<  153[H]  4.1   |  22.0  |  0.63    Ca    8.6      09 Nov 2024 06:00  Phos  2.9     11-08  Mg     2.2     11-09    TPro  6.1[L]  /  Alb  3.0[L]  /  TBili  0.4  /  DBili  x   /  AST  20  /  ALT  14  /  AlkPhos  72  11-08        RADIOLOGY:  Reviewed     PHYSICAL EXAM:  General: NAD, AAOx3  HEENT: atraumatic, normocephalic  Pulmonary: clear to auscultation bilaterally; No wheeze  Cardiovascular: Regular rate and rhythm; no murmurs, rubs or gallops. Normal S1S2  Gastrointestinal: Soft, nontender, nondistended; bowel sounds present  Musculoskeletal: 2+ peripheral pulses, no clubbing, cyanosis or edema  Neurology: Pt. alert and oriented, fluent speech, able to move all extremities  Skin: no rashes or lesions

## 2024-11-09 NOTE — PROGRESS NOTE ADULT - ASSESSMENT
59 F PMHx HTN, HLD, T1DM, hypothyroidism, current tobacco use (~1ppd for 20 years) presented to [facility] ED 11/4/24 for complaint of slurred speech and right sided weakness. Last known well was 2AM that morning when patient went to sleep; she awoke at 6AM complaining of right upper and lower extremity weakness, as well as word-finding difficulty. Symptoms resolved after about 20 minutes, however recurred again at 8AM and then gradually resolved. Patient admitted for CVA.     Plan:   TIA  Stroke  - Last known well 0200 11/4  - NIHSS on admission 1  - Symptoms: right arm/leg weakness, right lip depression - resolved   - Social/med Hx: smoking, DM, HTN   - Cause: Possible small vessel disease   - Stroke order set placed  - No intervention     - LDL 59, A1c 8.9  - CT/MRI negative   - TTE showed EF 55%  - Passed dysphagia screen    - s/p Plavix load 300mg (11/4/2024), c/w 81mg ASA and plavix 75mg daily for anticipated 21 days (11/25) followed by ASA 81mg monotherapy there after.   - C/w Atorvastatin 80 mg   - vitals/neuro q8   - Neuro consulted, no further recs, signed off       Fever of unknown origin   - Fever 1x overnight, 101.3F, self resolved   - BCx 11/5 NGTD, RVP (-), Procal normal   - CT chest/abd/pelvis showed enlarged pelvic lymph nodes and small b/l pleural effusions  - s/p 1x IV lasix 20 mg   - Trend Fever  - Trend WBC    DM type 1, uncontrolled   - Home meds held   - HgbA1c 8.9   - Endocrine consulted, recs appreciated   - Lantus 22U   - Admelog 8U -->10U   - Sliding scale, 6U over 24 hrs w/ low AM BG   - Maintain -180    DVT: Aspirin, Plavix, Lovenox  Diet: Regular, consistent carb  Dispo: Pending FUO workup, 2-3 days        59 F PMHx HTN, HLD, T1DM, hypothyroidism, current tobacco use (~1ppd for 20 years) presented to [facility] ED 11/4/24 for complaint of slurred speech and right sided weakness. Last known well was 2AM that morning when patient went to sleep; she awoke at 6AM complaining of right upper and lower extremity weakness, as well as word-finding difficulty. Symptoms resolved after about 20 minutes, however recurred again at 8AM and then gradually resolved. Patient admitted for CVA.     Plan:   TIA  Stroke  - Last known well 0200 11/4  - NIHSS on admission 1  - Symptoms: right arm/leg weakness, right lip depression - resolved   - Social/med Hx: smoking, DM, HTN   - Cause: Possible small vessel disease   - Stroke order set placed  - No intervention     - LDL 59, A1c 8.9  - CT/MRI negative   - TTE showed EF 55%  - Passed dysphagia screen    - s/p Plavix load 300mg (11/4/2024), c/w 81mg ASA and plavix 75mg daily for anticipated 21 days (11/25) followed by ASA 81mg monotherapy there after.   - C/w Atorvastatin 80 mg   - vitals/neuro q8   - Neuro consulted, no further recs, signed off       Fever of unknown origin   - Fever 1x overnight, 101.3F, self resolved   - BCx 11/5 NGTD, RVP (-), Procal normal   - CT chest/abd/pelvis showed enlarged pelvic lymph nodes and small b/l pleural effusions  - s/p 1x IV lasix 20 mg   - ID consulted, following case  - Heme/onc consulted, benign lymphadenopathy, no further recs   - Trend Fever  - Trend WBC    DM type 1, uncontrolled   - Home meds held   - HgbA1c 8.9   - Endocrine consulted, recs appreciated   - Lantus 22U   - Admelog 8U -->10U   - Sliding scale, 6U over 24 hrs w/ low AM BG   - Maintain -180    DVT: Aspirin, Plavix, Lovenox  Diet: Regular, consistent carb  Dispo: Pending FUO workup, 2-3 days        59 F PMHx HTN, HLD, T1DM, hypothyroidism, current tobacco use (~1ppd for 20 years) presented to [facility] ED 11/4/24 for complaint of slurred speech and right sided weakness. Last known well was 2AM that morning when patient went to sleep; she awoke at 6AM complaining of right upper and lower extremity weakness, as well as word-finding difficulty. Symptoms resolved after about 20 minutes, however recurred again at 8AM and then gradually resolved. Patient admitted for CVA.     Plan:   Fever of unknown origin   - Fever 1x overnight, 101.3F, self resolved   - BCx 11/5 NGTD, RVP (-), Procal normal   - CT chest/abd/pelvis showed enlarged pelvic lymph nodes and small b/l pleural effusions  - s/p 1x IV lasix 20 mg   - ID consulted, following case  - Heme/onc consulted, benign lymphadenopathy, no further recs   - Trend Fever  - Trend WBC    CVA likely TIA  - Last known well 0200 11/4  - NIHSS on admission 1  - Symptoms: right arm/leg weakness, right lip depression - resolved   - Social/med Hx: smoking, DM, HTN   - Cause: Possible small vessel disease   - Stroke order set placed  - No intervention     - LDL 59, A1c 8.9  - CT/MRI negative   - TTE showed EF 55%  - Passed dysphagia screen    - s/p Plavix load 300mg (11/4/2024), c/w 81mg ASA and plavix 75mg daily for anticipated 21 days (11/25) followed by ASA 81mg monotherapy there after.   - C/w Atorvastatin 80 mg   - vitals/neuro q8   - Neuro consulted, no further recs, signed off     DM type 1, uncontrolled   - Home meds held   - HgbA1c 8.9   - Endocrine consulted, recs appreciated   - Lantus 22U   - Admelog 8U -->10U   - Sliding scale, 6U over 24 hrs w/ low AM BG   - Maintain -180    DVT: Aspirin, Plavix, Lovenox  Diet: Regular, consistent carb  Dispo: Pending FUO workup, 2-3 days

## 2024-11-10 VITALS
HEART RATE: 74 BPM | DIASTOLIC BLOOD PRESSURE: 67 MMHG | SYSTOLIC BLOOD PRESSURE: 139 MMHG | TEMPERATURE: 99 F | RESPIRATION RATE: 18 BRPM | OXYGEN SATURATION: 94 %

## 2024-11-10 LAB
ANION GAP SERPL CALC-SCNC: 13 MMOL/L — SIGNIFICANT CHANGE UP (ref 5–17)
BUN SERPL-MCNC: 7.6 MG/DL — LOW (ref 8–20)
CALCIUM SERPL-MCNC: 8.6 MG/DL — SIGNIFICANT CHANGE UP (ref 8.4–10.5)
CHLORIDE SERPL-SCNC: 98 MMOL/L — SIGNIFICANT CHANGE UP (ref 96–108)
CO2 SERPL-SCNC: 23 MMOL/L — SIGNIFICANT CHANGE UP (ref 22–29)
CREAT SERPL-MCNC: 0.69 MG/DL — SIGNIFICANT CHANGE UP (ref 0.5–1.3)
CULTURE RESULTS: SIGNIFICANT CHANGE UP
CULTURE RESULTS: SIGNIFICANT CHANGE UP
EGFR: 99 ML/MIN/1.73M2 — SIGNIFICANT CHANGE UP
GLUCOSE BLDC GLUCOMTR-MCNC: 262 MG/DL — HIGH (ref 70–99)
GLUCOSE SERPL-MCNC: 234 MG/DL — HIGH (ref 70–99)
HCT VFR BLD CALC: 36.3 % — SIGNIFICANT CHANGE UP (ref 34.5–45)
HGB BLD-MCNC: 12.7 G/DL — SIGNIFICANT CHANGE UP (ref 11.5–15.5)
MAGNESIUM SERPL-MCNC: 2.2 MG/DL — SIGNIFICANT CHANGE UP (ref 1.8–2.6)
MCHC RBC-ENTMCNC: 31 PG — SIGNIFICANT CHANGE UP (ref 27–34)
MCHC RBC-ENTMCNC: 35 G/DL — SIGNIFICANT CHANGE UP (ref 32–36)
MCV RBC AUTO: 88.5 FL — SIGNIFICANT CHANGE UP (ref 80–100)
PHOSPHATE SERPL-MCNC: 3.1 MG/DL — SIGNIFICANT CHANGE UP (ref 2.4–4.7)
PLATELET # BLD AUTO: 462 K/UL — HIGH (ref 150–400)
POTASSIUM SERPL-MCNC: 4.3 MMOL/L — SIGNIFICANT CHANGE UP (ref 3.5–5.3)
POTASSIUM SERPL-SCNC: 4.3 MMOL/L — SIGNIFICANT CHANGE UP (ref 3.5–5.3)
RBC # BLD: 4.1 M/UL — SIGNIFICANT CHANGE UP (ref 3.8–5.2)
RBC # FLD: 12.2 % — SIGNIFICANT CHANGE UP (ref 10.3–14.5)
SODIUM SERPL-SCNC: 134 MMOL/L — LOW (ref 135–145)
SPECIMEN SOURCE: SIGNIFICANT CHANGE UP
SPECIMEN SOURCE: SIGNIFICANT CHANGE UP
WBC # BLD: 10.48 K/UL — SIGNIFICANT CHANGE UP (ref 3.8–10.5)
WBC # FLD AUTO: 10.48 K/UL — SIGNIFICANT CHANGE UP (ref 3.8–10.5)

## 2024-11-10 PROCEDURE — 70496 CT ANGIOGRAPHY HEAD: CPT | Mod: MC

## 2024-11-10 PROCEDURE — 70551 MRI BRAIN STEM W/O DYE: CPT | Mod: MC

## 2024-11-10 PROCEDURE — 93005 ELECTROCARDIOGRAM TRACING: CPT

## 2024-11-10 PROCEDURE — 84439 ASSAY OF FREE THYROXINE: CPT

## 2024-11-10 PROCEDURE — 84484 ASSAY OF TROPONIN QUANT: CPT

## 2024-11-10 PROCEDURE — 70498 CT ANGIOGRAPHY NECK: CPT | Mod: MC

## 2024-11-10 PROCEDURE — 71250 CT THORAX DX C-: CPT | Mod: MC

## 2024-11-10 PROCEDURE — 99285 EMERGENCY DEPT VISIT HI MDM: CPT

## 2024-11-10 PROCEDURE — 86038 ANTINUCLEAR ANTIBODIES: CPT

## 2024-11-10 PROCEDURE — 85027 COMPLETE CBC AUTOMATED: CPT

## 2024-11-10 PROCEDURE — 82105 ALPHA-FETOPROTEIN SERUM: CPT

## 2024-11-10 PROCEDURE — 0042T: CPT | Mod: MC

## 2024-11-10 PROCEDURE — 82378 CARCINOEMBRYONIC ANTIGEN: CPT

## 2024-11-10 PROCEDURE — 74176 CT ABD & PELVIS W/O CONTRAST: CPT | Mod: MC

## 2024-11-10 PROCEDURE — 84100 ASSAY OF PHOSPHORUS: CPT

## 2024-11-10 PROCEDURE — 71045 X-RAY EXAM CHEST 1 VIEW: CPT

## 2024-11-10 PROCEDURE — 87040 BLOOD CULTURE FOR BACTERIA: CPT

## 2024-11-10 PROCEDURE — 82962 GLUCOSE BLOOD TEST: CPT

## 2024-11-10 PROCEDURE — 80061 LIPID PANEL: CPT

## 2024-11-10 PROCEDURE — 83615 LACTATE (LD) (LDH) ENZYME: CPT

## 2024-11-10 PROCEDURE — 86140 C-REACTIVE PROTEIN: CPT

## 2024-11-10 PROCEDURE — 84436 ASSAY OF TOTAL THYROXINE: CPT

## 2024-11-10 PROCEDURE — 84480 ASSAY TRIIODOTHYRONINE (T3): CPT

## 2024-11-10 PROCEDURE — 0225U NFCT DS DNA&RNA 21 SARSCOV2: CPT

## 2024-11-10 PROCEDURE — 36415 COLL VENOUS BLD VENIPUNCTURE: CPT

## 2024-11-10 PROCEDURE — 86301 IMMUNOASSAY TUMOR CA 19-9: CPT

## 2024-11-10 PROCEDURE — 80048 BASIC METABOLIC PNL TOTAL CA: CPT

## 2024-11-10 PROCEDURE — 70450 CT HEAD/BRAIN W/O DYE: CPT | Mod: MC

## 2024-11-10 PROCEDURE — 84145 PROCALCITONIN (PCT): CPT

## 2024-11-10 PROCEDURE — 85610 PROTHROMBIN TIME: CPT

## 2024-11-10 PROCEDURE — 85025 COMPLETE CBC W/AUTO DIFF WBC: CPT

## 2024-11-10 PROCEDURE — 95819 EEG AWAKE AND ASLEEP: CPT

## 2024-11-10 PROCEDURE — 81003 URINALYSIS AUTO W/O SCOPE: CPT

## 2024-11-10 PROCEDURE — 99239 HOSP IP/OBS DSCHRG MGMT >30: CPT

## 2024-11-10 PROCEDURE — 85652 RBC SED RATE AUTOMATED: CPT

## 2024-11-10 PROCEDURE — 83735 ASSAY OF MAGNESIUM: CPT

## 2024-11-10 PROCEDURE — 83605 ASSAY OF LACTIC ACID: CPT

## 2024-11-10 PROCEDURE — 82728 ASSAY OF FERRITIN: CPT

## 2024-11-10 PROCEDURE — 83036 HEMOGLOBIN GLYCOSYLATED A1C: CPT

## 2024-11-10 PROCEDURE — 93970 EXTREMITY STUDY: CPT

## 2024-11-10 PROCEDURE — 85730 THROMBOPLASTIN TIME PARTIAL: CPT

## 2024-11-10 PROCEDURE — 93306 TTE W/DOPPLER COMPLETE: CPT

## 2024-11-10 PROCEDURE — 86304 IMMUNOASSAY TUMOR CA 125: CPT

## 2024-11-10 PROCEDURE — 84443 ASSAY THYROID STIM HORMONE: CPT

## 2024-11-10 PROCEDURE — 86431 RHEUMATOID FACTOR QUANT: CPT

## 2024-11-10 PROCEDURE — 80053 COMPREHEN METABOLIC PANEL: CPT

## 2024-11-10 PROCEDURE — 80076 HEPATIC FUNCTION PANEL: CPT

## 2024-11-10 RX ORDER — INSULIN GLARGINE,HUM.REC.ANLOG 100/ML
22 VIAL (ML) SUBCUTANEOUS
Qty: 0 | Refills: 0 | DISCHARGE
Start: 2024-11-10

## 2024-11-10 RX ORDER — INSULIN DEGLUDEC 100 U/ML
36 INJECTION, SOLUTION SUBCUTANEOUS
Refills: 0 | DISCHARGE

## 2024-11-10 RX ORDER — ASPIRIN/MAG CARB/ALUMINUM AMIN 325 MG
1 TABLET ORAL
Qty: 0 | Refills: 0 | DISCHARGE
Start: 2024-11-10

## 2024-11-10 RX ADMIN — Medication 3: at 07:50

## 2024-11-10 RX ADMIN — ERYTHROMYCIN 1 APPLICATION(S): 5 OINTMENT OPHTHALMIC at 05:23

## 2024-11-10 RX ADMIN — Medication 137 MICROGRAM(S): at 05:23

## 2024-11-10 RX ADMIN — Medication 10 UNIT(S): at 07:51

## 2024-11-10 RX ADMIN — Medication 40 MILLIGRAM(S): at 07:55

## 2024-11-10 NOTE — PROGRESS NOTE ADULT - ASSESSMENT
59 F PMHx HTN, HLD, T1DM, hypothyroidism, current tobacco use (~1ppd for 20 years) presented to [facility] ED 11/4/24 for complaint of slurred speech and right sided weakness. Last known well was 2AM that morning when patient went to sleep; she awoke at 6AM complaining of right upper and lower extremity weakness, as well as word-finding difficulty. Symptoms resolved after about 20 minutes, however recurred again at 8AM and then gradually resolved. Patient admitted for CVA.     Fever of unknown origin   - fever resolved  - BCx 11/5 NGTD, RVP (-), Procal normal   - CT chest/abd/pelvis showed enlarged pelvic lymph nodes needs follow up with outpatient oncology  - s/p 1x IV lasix 20 mg   - ID consulted, following case  - Heme/onc consulted, benign lymphadenopathy, no further recs   - Trend Fever  - Trend WBC    CVA likely TIA  - Last known well 0200 11/4  - NIHSS on admission 1  - Symptoms: right arm/leg weakness, right lip depression - resolved   - Social/med Hx: smoking, DM, HTN   - Cause: Possible small vessel disease   - Stroke order set placed  - No intervention     - LDL 59, A1c 8.9  - CT/MRI negative   - TTE showed EF 55%  - Passed dysphagia screen    - s/p Plavix load 300mg (11/4/2024), c/w 81mg ASA and plavix 75mg daily for anticipated 21 days (11/25) followed by ASA 81mg monotherapy there after.   - C/w Atorvastatin 80 mg   - vitals/neuro q8   - Neuro consulted, no further recs, signed off     DM type 1, uncontrolled   - Home meds held   - HgbA1c 8.9   - Endocrine consulted, recs appreciated   - Lantus 22U   - Admelog 8U -->10U   - Sliding scale, 6U over 24 hrs w/ low AM BG   - Maintain -180    DVT: Aspirin, Plavix, Lovenox  Diet: Regular, consistent carb  Dispo: d/c home with outpatient pcp and oncology follow up

## 2024-11-10 NOTE — PROGRESS NOTE ADULT - SUBJECTIVE AND OBJECTIVE BOX
Jayro Keyes MD  [facility] Medicine  Contact via Teams or text/call at [phone]    Patient is a 59y old  Female who presents with a chief complaint of Concern for stroke (09 Nov 2024 07:55)    Feels better. Headache resolved.  Denies chest pain, sob, abdominal pain.      Patient seen and examined at bedside. No overnight events reported.     ALLERGIES:  No Known Allergies    MEDICATIONS  (STANDING):  aspirin enteric coated 81 milliGRAM(s) Oral daily  atorvastatin 80 milliGRAM(s) Oral at bedtime  clopidogrel Tablet 75 milliGRAM(s) Oral daily  dextrose 5%. 1000 milliLiter(s) (100 mL/Hr) IV Continuous <Continuous>  dextrose 5%. 1000 milliLiter(s) (50 mL/Hr) IV Continuous <Continuous>  dextrose 50% Injectable 12.5 Gram(s) IV Push once  dextrose 50% Injectable 25 Gram(s) IV Push once  dextrose 50% Injectable 25 Gram(s) IV Push once  enoxaparin Injectable 40 milliGRAM(s) SubCutaneous every 24 hours  erythromycin   Ointment 1 Application(s) Left EYE three times a day  glucagon  Injectable 1 milliGRAM(s) IntraMuscular once  influenza   Vaccine 0.5 milliLiter(s) IntraMuscular once  insulin glargine Injectable (LANTUS) 22 Unit(s) SubCutaneous at bedtime  insulin lispro (ADMELOG) corrective regimen sliding scale   SubCutaneous three times a day before meals  insulin lispro Injectable (ADMELOG) 10 Unit(s) SubCutaneous three times a day before meals  levothyroxine 137 MICROGram(s) Oral daily  nicotine - 21 mG/24Hr(s) Patch 1 Patch Transdermal daily    MEDICATIONS  (PRN):  acetaminophen     Tablet .. 650 milliGRAM(s) Oral every 6 hours PRN Temp greater or equal to 38C (100.4F), Mild Pain (1 - 3)  dextrose Oral Gel 15 Gram(s) Oral once PRN Blood Glucose LESS THAN 70 milliGRAM(s)/deciliter  methocarbamol 750 milliGRAM(s) Oral three times a day PRN Muscle Spasm    Vital Signs Last 24 Hrs  T(F): 98.5 (10 Nov 2024 08:47), Max: 100.5 (09 Nov 2024 20:31)  HR: 70 (10 Nov 2024 08:47) (62 - 75)  BP: 133/75 (10 Nov 2024 08:47) (117/74 - 149/76)  RR: 18 (10 Nov 2024 08:47) (17 - 18)  SpO2: 95% (10 Nov 2024 08:47) (94% - 96%)  I&O's Summary    PHYSICAL EXAM:  General: NAD, A/O x 3  ENT: No gross hearing impairment, Moist mucous membranes, no thrush  Neck: Supple, No JVD  Lungs: Clear to auscultation bilaterally, good air entry, non-labored breathing  Cardio: RRR, S1/S2, No murmur  Abdomen: Soft, Nontender, Nondistended; Bowel sounds present  Extremities: No calf tenderness, No cyanosis, No pitting edema  Psych: Appropriate mood and affect    LABS:                        12.7   10.48 )-----------( 462      ( 10 Nov 2024 05:55 )             36.3     11-10    134  |  98  |  7.6  ----------------------------<  234  4.3   |  23.0  |  0.69    Ca    8.6      10 Nov 2024 05:55  Phos  3.1     11-10  Mg     2.2     11-10    TPro  6.1  /  Alb  3.0  /  TBili  0.4  /  DBili  x   /  AST  20  /  ALT  14  /  AlkPhos  72  11-08              Procalcitonin: 0.09 ng/mL (11-08-24 @ 04:20)        11-05 Chol 148 mg/dL LDL -- HDL 22 mg/dL Trig 337 mg/dL              POCT Blood Glucose.: 262 mg/dL (10 Nov 2024 07:49)  POCT Blood Glucose.: 292 mg/dL (09 Nov 2024 21:20)  POCT Blood Glucose.: 260 mg/dL (09 Nov 2024 17:01)      Urinalysis Basic - ( 10 Nov 2024 05:55 )    Color: x / Appearance: x / SG: x / pH: x  Gluc: 234 mg/dL / Ketone: x  / Bili: x / Urobili: x   Blood: x / Protein: x / Nitrite: x   Leuk Esterase: x / RBC: x / WBC x   Sq Epi: x / Non Sq Epi: x / Bacteria: x        Culture - Blood (collected 05 Nov 2024 14:00)  Source: .Blood BLOOD  Preliminary Report (09 Nov 2024 19:01):    No growth at 4 days    Culture - Blood (collected 04 Nov 2024 23:14)  Source: .Blood BLOOD  Final Report (10 Nov 2024 06:01):    No growth at 5 days    Culture - Blood (collected 03 Nov 2024 12:40)  Source: .Blood BLOOD  Final Report (09 Nov 2024 01:00):    No growth at 5 days        RADIOLOGY & ADDITIONAL TESTS:    Care Discussed with Consultants/Other Providers:

## 2024-11-10 NOTE — DISCHARGE NOTE NURSING/CASE MANAGEMENT/SOCIAL WORK - NSDCFUADDAPPT_GEN_ALL_CORE_FT
APPTS ARE READY TO BE MADE: [X] YES    Best Family or Patient Contact (if needed):    Additional Information about above appointments (if needed):    1: Dr. Ruvalcaba-neurology- 1 month for f/u TIA  2: Dr. Yip- cardiology  3: heme/onc- one week    Other comments or requests:

## 2024-11-10 NOTE — DISCHARGE NOTE NURSING/CASE MANAGEMENT/SOCIAL WORK - PATIENT PORTAL LINK FT
You can access the FollowMyHealth Patient Portal offered by [facility] by registering at the following website: [email or web address]. By joining [facility]’s FollowMyHealth portal, you will also be able to view your health information using other applications (apps) compatible with our system.

## 2024-11-10 NOTE — PROGRESS NOTE ADULT - REASON FOR ADMISSION
Concern for stroke

## 2024-11-10 NOTE — PROGRESS NOTE ADULT - PROVIDER SPECIALTY LIST ADULT
Endocrinology
Endocrinology
Internal Medicine
Neurology
Endocrinology
Internal Medicine
Neurology
Endocrinology
Neurology
Hospitalist
Infectious Disease

## 2024-11-10 NOTE — DISCHARGE NOTE NURSING/CASE MANAGEMENT/SOCIAL WORK - NSDCPEWEB_GEN_ALL_CORE
[facility] for Tobacco Control website --- [email or web address] website --- [email or web address]

## 2024-11-10 NOTE — DISCHARGE NOTE NURSING/CASE MANAGEMENT/SOCIAL WORK - FINANCIAL ASSISTANCE
[facility] provides services at a reduced cost to those who are determined to be eligible through [facility]’s financial assistance program. Information regarding [facility]’s financial assistance program can be found by going to [email or web address] or by calling [phone].

## 2024-11-10 NOTE — DISCHARGE NOTE NURSING/CASE MANAGEMENT/SOCIAL WORK - NSDCPEEMAIL_GEN_ALL_CORE
[facility] for Tobacco Control email [email or web address]

## 2024-11-11 ENCOUNTER — NON-APPOINTMENT (OUTPATIENT)
Age: 59
End: 2024-11-11

## 2024-11-11 LAB — ANA TITR SER: NEGATIVE — SIGNIFICANT CHANGE UP

## 2024-11-11 RX ORDER — CLOPIDOGREL 75 MG/1
1 TABLET ORAL
Qty: 14 | Refills: 0
Start: 2024-11-11 | End: 2024-11-24

## 2024-11-12 LAB — ANA TITR SER: NEGATIVE — SIGNIFICANT CHANGE UP

## 2024-11-18 ENCOUNTER — NON-APPOINTMENT (OUTPATIENT)
Age: 59
End: 2024-11-18

## 2024-11-19 ENCOUNTER — APPOINTMENT (OUTPATIENT)
Dept: CARDIOLOGY | Facility: CLINIC | Age: 59
End: 2024-11-19
Payer: COMMERCIAL

## 2024-11-19 ENCOUNTER — NON-APPOINTMENT (OUTPATIENT)
Age: 59
End: 2024-11-19

## 2024-11-19 VITALS
WEIGHT: 176 LBS | DIASTOLIC BLOOD PRESSURE: 78 MMHG | HEIGHT: 66 IN | HEART RATE: 84 BPM | BODY MASS INDEX: 28.28 KG/M2 | SYSTOLIC BLOOD PRESSURE: 122 MMHG

## 2024-11-19 DIAGNOSIS — I10 ESSENTIAL (PRIMARY) HYPERTENSION: ICD-10-CM

## 2024-11-19 DIAGNOSIS — Z86.73 PERSONAL HISTORY OF TRANSIENT ISCHEMIC ATTACK (TIA), AND CEREBRAL INFARCTION W/OUT RESIDUAL DEFICITS: ICD-10-CM

## 2024-11-19 DIAGNOSIS — F17.200 NICOTINE DEPENDENCE, UNSPECIFIED, UNCOMPLICATED: ICD-10-CM

## 2024-11-19 DIAGNOSIS — E10.9 TYPE 1 DIABETES MELLITUS W/OUT COMPLICATIONS: ICD-10-CM

## 2024-11-19 PROCEDURE — G2211 COMPLEX E/M VISIT ADD ON: CPT | Mod: NC

## 2024-11-19 PROCEDURE — 93000 ELECTROCARDIOGRAM COMPLETE: CPT

## 2024-11-19 PROCEDURE — 99215 OFFICE O/P EST HI 40 MIN: CPT

## 2024-11-19 RX ORDER — CLOPIDOGREL BISULFATE 75 MG/1
75 TABLET, FILM COATED ORAL
Refills: 0 | Status: ACTIVE | COMMUNITY

## 2024-11-20 RX ORDER — PRAVASTATIN SODIUM 20 MG/1
20 TABLET ORAL
Qty: 90 | Refills: 1 | Status: ACTIVE | COMMUNITY
Start: 2024-11-20 | End: 1900-01-01

## 2024-11-25 ENCOUNTER — NON-APPOINTMENT (OUTPATIENT)
Age: 59
End: 2024-11-25

## 2024-12-02 ENCOUNTER — APPOINTMENT (OUTPATIENT)
Dept: OBGYN | Facility: CLINIC | Age: 59
End: 2024-12-02

## 2024-12-03 ENCOUNTER — APPOINTMENT (OUTPATIENT)
Dept: ENDOCRINOLOGY | Facility: CLINIC | Age: 59
End: 2024-12-03

## 2024-12-16 ENCOUNTER — NON-APPOINTMENT (OUTPATIENT)
Age: 59
End: 2024-12-16

## 2024-12-24 PROBLEM — F10.90 ALCOHOL USE: Status: ACTIVE | Noted: 2017-09-05

## 2024-12-27 ENCOUNTER — APPOINTMENT (OUTPATIENT)
Dept: NEUROLOGY | Facility: CLINIC | Age: 59
End: 2024-12-27

## 2024-12-27 LAB
HBA1C MFR BLD HPLC: 9.1
LDLC SERPL DIRECT ASSAY-MCNC: 78
TSH SERPL-ACNC: 0.08

## 2025-01-22 ENCOUNTER — APPOINTMENT (OUTPATIENT)
Dept: CARDIOLOGY | Facility: CLINIC | Age: 60
End: 2025-01-22

## 2025-02-15 ENCOUNTER — RX RENEWAL (OUTPATIENT)
Age: 60
End: 2025-02-15

## 2025-03-12 LAB
HBA1C MFR BLD HPLC: 7.7
LDLC SERPL DIRECT ASSAY-MCNC: 65
TSH SERPL-ACNC: 0.18

## 2025-03-13 ENCOUNTER — APPOINTMENT (OUTPATIENT)
Dept: ENDOCRINOLOGY | Facility: CLINIC | Age: 60
End: 2025-03-13
Payer: COMMERCIAL

## 2025-03-13 ENCOUNTER — NON-APPOINTMENT (OUTPATIENT)
Age: 60
End: 2025-03-13

## 2025-03-13 VITALS
HEIGHT: 66 IN | SYSTOLIC BLOOD PRESSURE: 130 MMHG | HEART RATE: 107 BPM | BODY MASS INDEX: 24.91 KG/M2 | WEIGHT: 155 LBS | OXYGEN SATURATION: 98 % | DIASTOLIC BLOOD PRESSURE: 78 MMHG

## 2025-03-13 DIAGNOSIS — E78.00 PURE HYPERCHOLESTEROLEMIA, UNSPECIFIED: ICD-10-CM

## 2025-03-13 DIAGNOSIS — E03.9 HYPOTHYROIDISM, UNSPECIFIED: ICD-10-CM

## 2025-03-13 DIAGNOSIS — E10.9 TYPE 1 DIABETES MELLITUS W/OUT COMPLICATIONS: ICD-10-CM

## 2025-03-13 DIAGNOSIS — E55.9 VITAMIN D DEFICIENCY, UNSPECIFIED: ICD-10-CM

## 2025-03-13 DIAGNOSIS — I10 ESSENTIAL (PRIMARY) HYPERTENSION: ICD-10-CM

## 2025-03-13 LAB — GLUCOSE BLDC GLUCOMTR-MCNC: 129

## 2025-03-13 PROCEDURE — 99214 OFFICE O/P EST MOD 30 MIN: CPT

## 2025-03-13 PROCEDURE — 82962 GLUCOSE BLOOD TEST: CPT

## 2025-03-13 RX ORDER — BLOOD-GLUCOSE SENSOR
EACH MISCELLANEOUS
Qty: 9 | Refills: 1 | Status: ACTIVE | COMMUNITY
Start: 2025-03-13 | End: 1900-01-01

## 2025-03-13 RX ORDER — ATORVASTATIN CALCIUM 10 MG/1
10 TABLET, FILM COATED ORAL
Refills: 0 | Status: ACTIVE | COMMUNITY

## 2025-03-13 RX ORDER — SPIRONOLACTONE 25 MG
TABLET ORAL
Refills: 0 | Status: ACTIVE | COMMUNITY

## 2025-03-13 RX ORDER — ASPIRIN 81 MG
81 TABLET, DELAYED RELEASE (ENTERIC COATED) ORAL
Refills: 0 | Status: ACTIVE | COMMUNITY

## 2025-04-02 ENCOUNTER — APPOINTMENT (OUTPATIENT)
Dept: ENDOCRINOLOGY | Facility: CLINIC | Age: 60
End: 2025-04-02

## 2025-04-16 ENCOUNTER — APPOINTMENT (OUTPATIENT)
Dept: ENDOCRINOLOGY | Facility: CLINIC | Age: 60
End: 2025-04-16
Payer: COMMERCIAL

## 2025-04-16 DIAGNOSIS — E10.65 TYPE 1 DIABETES MELLITUS WITH HYPERGLYCEMIA: ICD-10-CM

## 2025-04-16 PROCEDURE — G0108 DIAB MANAGE TRN  PER INDIV: CPT

## 2025-04-21 ENCOUNTER — NON-APPOINTMENT (OUTPATIENT)
Age: 60
End: 2025-04-21

## 2025-05-08 ENCOUNTER — APPOINTMENT (OUTPATIENT)
Dept: ENDOCRINOLOGY | Facility: CLINIC | Age: 60
End: 2025-05-08
Payer: COMMERCIAL

## 2025-05-08 VITALS
WEIGHT: 159 LBS | SYSTOLIC BLOOD PRESSURE: 110 MMHG | DIASTOLIC BLOOD PRESSURE: 64 MMHG | HEIGHT: 66 IN | OXYGEN SATURATION: 97 % | BODY MASS INDEX: 25.55 KG/M2 | HEART RATE: 80 BPM

## 2025-05-08 DIAGNOSIS — E10.65 TYPE 1 DIABETES MELLITUS WITH HYPERGLYCEMIA: ICD-10-CM

## 2025-05-08 DIAGNOSIS — I10 ESSENTIAL (PRIMARY) HYPERTENSION: ICD-10-CM

## 2025-05-08 DIAGNOSIS — E03.9 HYPOTHYROIDISM, UNSPECIFIED: ICD-10-CM

## 2025-05-08 DIAGNOSIS — E55.9 VITAMIN D DEFICIENCY, UNSPECIFIED: ICD-10-CM

## 2025-05-08 DIAGNOSIS — E78.00 PURE HYPERCHOLESTEROLEMIA, UNSPECIFIED: ICD-10-CM

## 2025-05-08 LAB — GLUCOSE BLDC GLUCOMTR-MCNC: 112

## 2025-05-08 PROCEDURE — 82962 GLUCOSE BLOOD TEST: CPT

## 2025-05-08 PROCEDURE — 99214 OFFICE O/P EST MOD 30 MIN: CPT

## 2025-05-08 RX ORDER — PEN NEEDLE, DIABETIC 32GX 5/32"
32G X 4 MM NEEDLE, DISPOSABLE MISCELLANEOUS
Qty: 4 | Refills: 1 | Status: ACTIVE | COMMUNITY
Start: 2025-05-08 | End: 1900-01-01

## 2025-07-23 ENCOUNTER — NON-APPOINTMENT (OUTPATIENT)
Age: 60
End: 2025-07-23

## (undated) DEVICE — VALVE ENDOSCOPE DEFENDO SINGLE USE

## (undated) DEVICE — FORCEP RADIAL JAW 4 JUMBO W NDL 2.8MM 3.2MM 240CM ORANGE DISP